# Patient Record
Sex: FEMALE | Race: WHITE | NOT HISPANIC OR LATINO | Employment: OTHER | URBAN - METROPOLITAN AREA
[De-identification: names, ages, dates, MRNs, and addresses within clinical notes are randomized per-mention and may not be internally consistent; named-entity substitution may affect disease eponyms.]

---

## 2017-04-17 ENCOUNTER — HOSPITAL ENCOUNTER (OUTPATIENT)
Dept: LAB | Facility: MEDICAL CENTER | Age: 67
End: 2017-04-17
Attending: INTERNAL MEDICINE
Payer: MEDICARE

## 2017-04-17 LAB
GFR SERPL CREATININE-BSD FRML MDRD: >60 ML/MIN/1.73 M 2
THYROPEROXIDASE AB SERPL-ACNC: <0.2 IU/ML (ref 0–9)
TRANSFERRIN SERPL-MCNC: 260 MG/DL (ref 200–370)

## 2017-04-17 PROCEDURE — 36415 COLL VENOUS BLD VENIPUNCTURE: CPT

## 2017-04-17 PROCEDURE — 83550 IRON BINDING TEST: CPT | Mod: GA

## 2017-04-17 PROCEDURE — 83704 LIPOPROTEIN BLD QUAN PART: CPT

## 2017-04-17 PROCEDURE — 80053 COMPREHEN METABOLIC PANEL: CPT

## 2017-04-17 PROCEDURE — 85025 COMPLETE CBC W/AUTO DIFF WBC: CPT

## 2017-04-17 PROCEDURE — 83540 ASSAY OF IRON: CPT | Mod: GA

## 2017-04-17 PROCEDURE — 80061 LIPID PANEL: CPT | Mod: 59

## 2017-04-17 PROCEDURE — 82248 BILIRUBIN DIRECT: CPT

## 2017-04-17 PROCEDURE — 86376 MICROSOMAL ANTIBODY EACH: CPT

## 2017-04-17 PROCEDURE — 84466 ASSAY OF TRANSFERRIN: CPT | Mod: GA

## 2017-04-17 PROCEDURE — 82728 ASSAY OF FERRITIN: CPT | Mod: GA

## 2017-04-17 PROCEDURE — 84443 ASSAY THYROID STIM HORMONE: CPT

## 2017-04-17 PROCEDURE — 84436 ASSAY OF TOTAL THYROXINE: CPT

## 2017-04-18 LAB
ALBUMIN SERPL BCP-MCNC: 4.2 G/DL (ref 3.2–4.9)
ALBUMIN/GLOB SERPL: 1.6 G/DL
ALP SERPL-CCNC: 54 U/L (ref 30–99)
ALT SERPL-CCNC: 17 U/L (ref 2–50)
ANION GAP SERPL CALC-SCNC: 6 MMOL/L (ref 0–11.9)
AST SERPL-CCNC: 23 U/L (ref 12–45)
BASOPHILS # BLD AUTO: 0.5 % (ref 0–1.8)
BASOPHILS # BLD: 0.02 K/UL (ref 0–0.12)
BILIRUB CONJ SERPL-MCNC: <0.1 MG/DL (ref 0.1–0.5)
BILIRUB INDIRECT SERPL-MCNC: NORMAL MG/DL (ref 0–1)
BILIRUB SERPL-MCNC: 0.3 MG/DL (ref 0.1–1.5)
BUN SERPL-MCNC: 15 MG/DL (ref 8–22)
CALCIUM SERPL-MCNC: 9.4 MG/DL (ref 8.5–10.5)
CHLORIDE SERPL-SCNC: 106 MMOL/L (ref 96–112)
CO2 SERPL-SCNC: 29 MMOL/L (ref 20–33)
CREAT SERPL-MCNC: 0.76 MG/DL (ref 0.5–1.4)
EOSINOPHIL # BLD AUTO: 0.02 K/UL (ref 0–0.51)
EOSINOPHIL NFR BLD: 0.5 % (ref 0–6.9)
ERYTHROCYTE [DISTWIDTH] IN BLOOD BY AUTOMATED COUNT: 45.9 FL (ref 35.9–50)
FERRITIN SERPL-MCNC: 23.7 NG/ML (ref 10–291)
GLOBULIN SER CALC-MCNC: 2.7 G/DL (ref 1.9–3.5)
GLUCOSE SERPL-MCNC: 94 MG/DL (ref 65–99)
HCT VFR BLD AUTO: 40.4 % (ref 37–47)
HGB BLD-MCNC: 13 G/DL (ref 12–16)
IMM GRANULOCYTES # BLD AUTO: 0 K/UL (ref 0–0.11)
IMM GRANULOCYTES NFR BLD AUTO: 0 % (ref 0–0.9)
IRON SATN MFR SERPL: 19 % (ref 15–55)
IRON SERPL-MCNC: 68 UG/DL (ref 40–170)
LYMPHOCYTES # BLD AUTO: 1.79 K/UL (ref 1–4.8)
LYMPHOCYTES NFR BLD: 48.1 % (ref 22–41)
MCH RBC QN AUTO: 29.4 PG (ref 27–33)
MCHC RBC AUTO-ENTMCNC: 32.2 G/DL (ref 33.6–35)
MCV RBC AUTO: 91.4 FL (ref 81.4–97.8)
MONOCYTES # BLD AUTO: 0.29 K/UL (ref 0–0.85)
MONOCYTES NFR BLD AUTO: 7.8 % (ref 0–13.4)
NEUTROPHILS # BLD AUTO: 1.6 K/UL (ref 2–7.15)
NEUTROPHILS NFR BLD: 43.1 % (ref 44–72)
NRBC # BLD AUTO: 0 K/UL
NRBC BLD AUTO-RTO: 0 /100 WBC
PLATELET # BLD AUTO: 247 K/UL (ref 164–446)
PMV BLD AUTO: 10.6 FL (ref 9–12.9)
POTASSIUM SERPL-SCNC: 4.6 MMOL/L (ref 3.6–5.5)
PROT SERPL-MCNC: 6.9 G/DL (ref 6–8.2)
RBC # BLD AUTO: 4.42 M/UL (ref 4.2–5.4)
SODIUM SERPL-SCNC: 141 MMOL/L (ref 135–145)
T4 SERPL-MCNC: 11.6 UG/DL (ref 4–12)
TIBC SERPL-MCNC: 365 UG/DL (ref 250–450)
TSH SERPL DL<=0.005 MIU/L-ACNC: 0.65 UIU/ML (ref 0.3–3.7)
WBC # BLD AUTO: 3.7 K/UL (ref 4.8–10.8)

## 2017-04-20 LAB
CHOLEST SERPL-MCNC: 208 MG/DL (ref 100–199)
HDL PARTICAL NO Q4363: 45.3 UMOL/L
HDL SERPL QN: 10.1 NM
HDLC SERPL-MCNC: 103 MG/DL
HLD.LARGE SERPL-SCNC: 15 UMOL/L
LDL MED SERPL QN: 21.2 NM
LDL SERPL QN: 21.2 NM
LDL SERPL-SCNC: 975 NMOL/L
LDL SMALL SERPL-SCNC: <90 NMOL/L
LDL SMALL SERPL-SCNC: <90 NMOL/L
LDLC SERPL CALC-MCNC: 95 MG/DL (ref 0–99)
LP IR SCORE Q4364: <25
TRIGL SERPL-MCNC: 51 MG/DL (ref 0–149)
VLDL LARGE SERPL-SCNC: 0.8 NMOL/L
VLDL SERPL QN: 45.1 NM

## 2017-07-09 ENCOUNTER — HOSPITAL ENCOUNTER (EMERGENCY)
Facility: MEDICAL CENTER | Age: 67
End: 2017-07-09
Attending: EMERGENCY MEDICINE
Payer: MEDICARE

## 2017-07-09 VITALS
HEART RATE: 89 BPM | OXYGEN SATURATION: 98 % | TEMPERATURE: 98.5 F | DIASTOLIC BLOOD PRESSURE: 90 MMHG | BODY MASS INDEX: 19.84 KG/M2 | HEIGHT: 66 IN | WEIGHT: 123.46 LBS | RESPIRATION RATE: 18 BRPM | SYSTOLIC BLOOD PRESSURE: 138 MMHG

## 2017-07-09 DIAGNOSIS — S39.012A LUMBAR STRAIN, INITIAL ENCOUNTER: ICD-10-CM

## 2017-07-09 PROCEDURE — 700102 HCHG RX REV CODE 250 W/ 637 OVERRIDE(OP): Performed by: EMERGENCY MEDICINE

## 2017-07-09 PROCEDURE — A9270 NON-COVERED ITEM OR SERVICE: HCPCS | Performed by: EMERGENCY MEDICINE

## 2017-07-09 PROCEDURE — 99283 EMERGENCY DEPT VISIT LOW MDM: CPT

## 2017-07-09 RX ORDER — OXYCODONE HYDROCHLORIDE AND ACETAMINOPHEN 5; 325 MG/1; MG/1
1 TABLET ORAL ONCE
Status: COMPLETED | OUTPATIENT
Start: 2017-07-09 | End: 2017-07-09

## 2017-07-09 RX ORDER — IBUPROFEN 600 MG/1
600 TABLET ORAL ONCE
Status: COMPLETED | OUTPATIENT
Start: 2017-07-09 | End: 2017-07-09

## 2017-07-09 RX ORDER — OXYCODONE HYDROCHLORIDE AND ACETAMINOPHEN 5; 325 MG/1; MG/1
0.5 TABLET ORAL EVERY 4 HOURS PRN
Qty: 5 TAB | Refills: 0 | Status: ON HOLD | OUTPATIENT
Start: 2017-07-09 | End: 2018-06-14

## 2017-07-09 RX ADMIN — IBUPROFEN 600 MG: 600 TABLET, FILM COATED ORAL at 15:40

## 2017-07-09 RX ADMIN — OXYCODONE HYDROCHLORIDE AND ACETAMINOPHEN 1 TABLET: 5; 325 TABLET ORAL at 15:40

## 2017-07-09 ASSESSMENT — PAIN SCALES - GENERAL: PAINLEVEL_OUTOF10: 8

## 2017-07-09 NOTE — ED PROVIDER NOTES
"ED Provider Note    CHIEF COMPLAINT  Chief Complaint   Patient presents with   • Back Pain       HPI  Miranda Rose is a 67 y.o. female who presents to the emergency department with low back discomfort. The patient states she is playing golf yesterday when she made a swing and then started having right low back discomfort. Subsequently the pain started to increase and today she states the pain is almost unbearable. She states the pain is localized to the right low back region. She does not have any radicular symptoms. She denies paresthesias in functional loss of her lower extremities. She does not have any difficulty with urination including dysuria nor hematuria.    REVIEW OF SYSTEMS  No recent fevers    PHYSICAL EXAM  VITAL SIGNS: /102 mmHg  Pulse 81  Temp(Src) 36.7 °C (98.1 °F)  Resp 20  Ht 1.676 m (5' 5.98\")  Wt 56 kg (123 lb 7.3 oz)  BMI 19.94 kg/m2  SpO2 98%  In general the patient does not appear toxic  Cervical, thoracic, lumbar spine does not have any midline tenderness nor step-offs  The patient does have reproducible pain in the paraspinal muscles of the lumbar spine on the right.  Skin no erythema nor induration  Neurologic examination motors 5 out of 5 and symmetric throughout her lower extremities and sensation is intact      COURSE & MEDICAL DECISION MAKING  Pertinent Labs & Imaging studies reviewed. (See chart for details)  This a 67-year-old female who presents with low back discomfort. Suspect this is from a lumbar strain. She does not have any urinary symptoms and she does have reproducible discomfort consistent with a muscular etiology. The patient will therefore be discharged on Motrin and Percocet for acute pain control. She will return for increased pain, weakness to her lower extremities, or difficulty initiating her stream of urine.     FINAL IMPRESSION  1. Acute lumbar strain     Disposition  The patient will be discharged in stable condition      Electronically signed " by: Carl Chiang, 7/9/2017 3:32 PM

## 2017-07-09 NOTE — DISCHARGE INSTRUCTIONS
Lumbosacral Strain  Lumbosacral strain is a strain of any of the parts that make up your lumbosacral vertebrae. Your lumbosacral vertebrae are the bones that make up the lower third of your backbone. Your lumbosacral vertebrae are held together by muscles and tough, fibrous tissue (ligaments).   CAUSES   A sudden blow to your back can cause lumbosacral strain. Also, anything that causes an excessive stretch of the muscles in the low back can cause this strain. This is typically seen when people exert themselves strenuously, fall, lift heavy objects, bend, or crouch repeatedly.  RISK FACTORS  · Physically demanding work.  · Participation in pushing or pulling sports or sports that require a sudden twist of the back (tennis, golf, baseball).  · Weight lifting.  · Excessive lower back curvature.  · Forward-tilted pelvis.  · Weak back or abdominal muscles or both.  · Tight hamstrings.  SIGNS AND SYMPTOMS   Lumbosacral strain may cause pain in the area of your injury or pain that moves (radiates) down your leg.   DIAGNOSIS  Your health care provider can often diagnose lumbosacral strain through a physical exam. In some cases, you may need tests such as X-ray exams.   TREATMENT   Treatment for your lower back injury depends on many factors that your clinician will have to evaluate. However, most treatment will include the use of anti-inflammatory medicines.  HOME CARE INSTRUCTIONS   · Avoid hard physical activities (tennis, racquetball, waterskiing) if you are not in proper physical condition for it. This may aggravate or create problems.  · If you have a back problem, avoid sports requiring sudden body movements. Swimming and walking are generally safer activities.  · Maintain good posture.  · Maintain a healthy weight.  · For acute conditions, you may put ice on the injured area.  ¨ Put ice in a plastic bag.  ¨ Place a towel between your skin and the bag.  ¨ Leave the ice on for 20 minutes, 2-3 times a day.  · When the  low back starts healing, stretching and strengthening exercises may be recommended.  SEEK MEDICAL CARE IF:  · Your back pain is getting worse.  · You experience severe back pain not relieved with medicines.  SEEK IMMEDIATE MEDICAL CARE IF:   · You have numbness, tingling, weakness, or problems with the use of your arms or legs.  · There is a change in bowel or bladder control.  · You have increasing pain in any area of the body, including your belly (abdomen).  · You notice shortness of breath, dizziness, or feel faint.  · You feel sick to your stomach (nauseous), are throwing up (vomiting), or become sweaty.  · You notice discoloration of your toes or legs, or your feet get very cold.  MAKE SURE YOU:   · Understand these instructions.  · Will watch your condition.  · Will get help right away if you are not doing well or get worse.     This information is not intended to replace advice given to you by your health care provider. Make sure you discuss any questions you have with your health care provider.     Document Released: 09/27/2006 Document Revised: 01/08/2016 Document Reviewed: 08/06/2014  [employer] Interactive Patient Education ©2016 [employer]

## 2017-07-09 NOTE — ED AVS SNAPSHOT
SendTask Access Code: Activation code not generated  Current SendTask Status: Active    [employer]  A secure, online tool to manage your health information     [employer]’s SendTask® is a secure, online tool that connects you to your personalized health information from the privacy of your home -- day or night - making it very easy for you to manage your healthcare. Once the activation process is completed, you can even access your medical information using the SendTask graciela, which is available for free in the Apple Graciela store or Google Play store.     SendTask provides the following levels of access (as shown below):   My Chart Features   [facility] Primary Care Doctor [facility]  Specialists [facility]  Urgent  Care Non-[facility]  Primary Care  Doctor   Email your healthcare team securely and privately 24/7 X X X X   Manage appointments: schedule your next appointment; view details of past/upcoming appointments X      Request prescription refills. X      View recent personal medical records, including lab and immunizations X X X X   View health record, including health history, allergies, medications X X X X   Read reports about your outpatient visits, procedures, consult and ER notes X X X X   See your discharge summary, which is a recap of your hospital and/or ER visit that includes your diagnosis, lab results, and care plan. X X       How to register for SendTask:  1. Go to  [email or web address].  2. Click on the Sign Up Now box, which takes you to the New Member Sign Up page. You will need to provide the following information:  a. Enter your SendTask Access Code exactly as it appears at the top of this page. (You will not need to use this code after you’ve completed the sign-up process. If you do not sign up before the expiration date, you must request a new code.)   b. Enter your date of birth.   c. Enter your home email address.   d. Click Submit, and follow the next screen’s instructions.  3. Create a SendTask ID. This will  be your [employer] login ID and cannot be changed, so think of one that is secure and easy to remember.  4. Create a [employer] password. You can change your password at any time.  5. Enter your Password Reset Question and Answer. This can be used at a later time if you forget your password.   6. Enter your e-mail address. This allows you to receive e-mail notifications when new information is available in [employer].  7. Click Sign Up. You can now view your health information.    For assistance activating your [employer] account, call [phone]

## 2017-07-09 NOTE — ED AVS SNAPSHOT
Home Care Instructions                                                                                                                Miranda Rose   MRN: [id number]    Department:  [facility] [place], Emergency Dept   Date of Visit:  7/9/2017            [facility], Emergency Dept    [address]    [place] [address]    Phone:  [phone]      You were seen by     Carl Chiang M.D.      Your Diagnosis Was     Lumbar strain, initial encounter     S39.012A       These are the medications you received during your hospitalization from 07/09/2017 1326 to 07/09/2017 1543     Date/Time Order Dose Route Action    07/09/2017 1540 oxycodone-acetaminophen (PERCOCET) 5-325 MG per tablet 1 Tab 1 Tab Oral Given    07/09/2017 1540 ibuprofen (MOTRIN) tablet 600 mg 600 mg Oral Given      Follow-up Information     1. Follow up with [facility], Emergency Dept.    Specialty:  Emergency Medicine    Why:  If symptoms worsen    Contact information    [id number] [id number] [phone]      Medication Information     Review all of your home medications and newly ordered medications with your primary doctor and/or pharmacist as soon as possible. Follow medication instructions as directed by your doctor and/or pharmacist.     Please keep your complete medication list with you and share with your physician. Update the information when medications are discontinued, doses are changed, or new medications (including over-the-counter products) are added; and carry medication information at all times in the event of emergency situations.               Medication List      START taking these medications        Instructions    Morning Afternoon Evening Bedtime    oxycodone-acetaminophen 5-325 MG Tabs   Last time this was given:  1 Tab on 7/9/2017  3:40 PM   Commonly known as:  PERCOCET        Take 0.5 Tabs by mouth every four hours as  needed.   Dose:  0.5 Tab                          ASK your doctor about these medications        Instructions    Morning Afternoon Evening Bedtime    atorvastatin 20 MG Tabs   Commonly known as:  LIPITOR        Take 20 mg by mouth every evening.   Dose:  20 mg                        doxepin 10 MG Caps   Commonly known as:  SINEQUAN        Take 15 mg by mouth every evening.   Dose:  15 mg                        levothyroxine 100 MCG Tabs   Commonly known as:  SYNTHROID        Take 88 mcg by mouth every day.   Dose:  88 mcg                        liothyronine 5 MCG Tabs   Commonly known as:  CYTOMEL        Take 5 mcg by mouth 2 Times a Day.   Dose:  5 mcg                        multivitamin Tabs        Take 1 Tab by mouth every day.   Dose:  1 Tab                        simvastatin 10 MG Tabs   Commonly known as:  ZOCOR        Take 10 mg by mouth every evening.   Dose:  10 mg                             Where to Get Your Medications      You can get these medications from any pharmacy     Bring a paper prescription for each of these medications    - oxycodone-acetaminophen 5-325 MG Tabs              Discharge Instructions       Lumbosacral Strain  Lumbosacral strain is a strain of any of the parts that make up your lumbosacral vertebrae. Your lumbosacral vertebrae are the bones that make up the lower third of your backbone. Your lumbosacral vertebrae are held together by muscles and tough, fibrous tissue (ligaments).   CAUSES   A sudden blow to your back can cause lumbosacral strain. Also, anything that causes an excessive stretch of the muscles in the low back can cause this strain. This is typically seen when people exert themselves strenuously, fall, lift heavy objects, bend, or crouch repeatedly.  RISK FACTORS  · Physically demanding work.  · Participation in pushing or pulling sports or sports that require a sudden twist of the back (tennis, golf, baseball).  · Weight lifting.  · Excessive lower back  curvature.  · Forward-tilted pelvis.  · Weak back or abdominal muscles or both.  · Tight hamstrings.  SIGNS AND SYMPTOMS   Lumbosacral strain may cause pain in the area of your injury or pain that moves (radiates) down your leg.   DIAGNOSIS  Your health care provider can often diagnose lumbosacral strain through a physical exam. In some cases, you may need tests such as X-ray exams.   TREATMENT   Treatment for your lower back injury depends on many factors that your clinician will have to evaluate. However, most treatment will include the use of anti-inflammatory medicines.  HOME CARE INSTRUCTIONS   · Avoid hard physical activities (tennis, racquetball, waterskiing) if you are not in proper physical condition for it. This may aggravate or create problems.  · If you have a back problem, avoid sports requiring sudden body movements. Swimming and walking are generally safer activities.  · Maintain good posture.  · Maintain a healthy weight.  · For acute conditions, you may put ice on the injured area.  ¨ Put ice in a plastic bag.  ¨ Place a towel between your skin and the bag.  ¨ Leave the ice on for 20 minutes, 2-3 times a day.  · When the low back starts healing, stretching and strengthening exercises may be recommended.  SEEK MEDICAL CARE IF:  · Your back pain is getting worse.  · You experience severe back pain not relieved with medicines.  SEEK IMMEDIATE MEDICAL CARE IF:   · You have numbness, tingling, weakness, or problems with the use of your arms or legs.  · There is a change in bowel or bladder control.  · You have increasing pain in any area of the body, including your belly (abdomen).  · You notice shortness of breath, dizziness, or feel faint.  · You feel sick to your stomach (nauseous), are throwing up (vomiting), or become sweaty.  · You notice discoloration of your toes or legs, or your feet get very cold.  MAKE SURE YOU:   · Understand these instructions.  · Will watch your condition.  · Will get help  right away if you are not doing well or get worse.     This information is not intended to replace advice given to you by your health care provider. Make sure you discuss any questions you have with your health care provider.     Document Released: 09/27/2006 Document Revised: 01/08/2016 Document Reviewed: 08/06/2014  Elsevier Interactive Patient Education ©2016 [employer]            Patient Information     Patient Information    Following emergency treatment: all patient requiring follow-up care must return either to a private physician or a clinic if your condition worsens before you are able to obtain further medical attention, please return to the emergency room.     Billing Information    [facility] we work to make the billing process streamlined for our patients.  Our Representatives are here to answer any questions you may have regarding your hospital bill.  If you have insurance coverage and have supplied your insurance information to us, we will submit a claim to your insurer on your behalf.  Should you have any questions regarding your bill, we can be reached online or by phone as follows:  Online: You are able pay your bills online or live chat with our representatives about any billing questions you may have. We are here to help Monday - Friday from 8:00am to 7:30pm and 9:00am - 12:00pm on Saturdays.  Please visit [email or web address]  for more information.   Phone:  [phone] or [phone]    Please note that your emergency physician, surgeon, pathologist, radiologist, anesthesiologist, and other specialists are not employed by [facility] and will therefore bill separately for their services.  Please contact them directly for any questions concerning their bills at the numbers below:     Emergency Physician Services:  [phone]  [facility]:  [phone]  [facility]:  [phone]  [facility]:   [phone]    1. Your final bill may vary from the amount quoted upon discharge if all procedures are not complete at that time, or if your doctor has additional procedures of which we are not aware. You will receive an additional bill if you return to the Emergency Department at [facility] for suture removal regardless of the facility of which the sutures were placed.     2. Please arrange for settlement of this account at the emergency registration.    3. All self-pay accounts are due in full at the time of treatment.  If you are unable to meet this obligation then payment is expected within 4-5 days.     4. If you have had radiology studies (CT, X-ray, Ultrasound, MRI), you have received a preliminary result during your emergency department visit. Please contact the radiology department [phone] to receive a copy of your final result. Please discuss the Final result with your primary physician or with the follow up physician provided.     Crisis Hotline:  [place] Crisis Hotline:  5-750-EELEBUQ or 1-608.534.2350  [place] Crisis Hotline:    1-225.598.3125 or 301-650-3662         ED Discharge Follow Up Questions    1. In order to provide you with very good care, we would like to follow up with a phone call in the next few days.  May we have your permission to contact you?     YES /  NO    2. What is the best phone number to call you? (       )_____-__________    3. What is the best time to call you?      Morning  /  Afternoon  /  Evening                   Patient Signature:  ____________________________________________________________    Date:  ____________________________________________________________

## 2017-07-09 NOTE — ED AVS SNAPSHOT
7/9/2017    Miranda Rose  [address] [place] [address]    Dear Miranda:    [facility] wants to ensure your discharge home is safe and you or your loved ones have had all of your questions answered regarding your care after you leave the hospital.    Below is a list of resources and contact information should you have any questions regarding your hospital stay, follow-up instructions, or active medical symptoms.    Questions or Concerns Regarding… Contact   Medical Questions Related to Your Discharge  (7 days a week, 8am-5pm) Contact a Nurse Care Coordinator   [phone]   Medical Questions Not Related to Your Discharge  (24 hours a day / 7 days a week)  Contact the Nurse Health Line   [phone]    Medications or Discharge Instructions Refer to your discharge packet   or contact your [facility] Primary Care Provider   [phone]   Follow-up Appointment(s) Schedule your appointment via NICO   or contact Scheduling [phone]   Billing Review your statement via NICO  or contact Billing [phone]   Medical Records Review your records via NICO   or contact Medical Records [phone]     You may receive a telephone call within two days of discharge. This call is to make certain you understand your discharge instructions and have the opportunity to have any questions answered. You can also easily access your medical information, test results and upcoming appointments via the NICO free online health management tool. You can learn more and sign up at [employer]/NICO. For assistance setting up your NICO account, please call [phone].    Once again, we want to ensure your discharge home is safe and that you have a clear understanding of any next steps in your care. If you have any questions or concerns, please do not hesitate to contact us, we are here for you. Thank you for choosing [facility] for your healthcare needs.    Sincerely,    Your [facility] Healthcare Team

## 2017-09-13 ENCOUNTER — HOSPITAL ENCOUNTER (OUTPATIENT)
Dept: RADIOLOGY | Facility: MEDICAL CENTER | Age: 67
End: 2017-09-13
Attending: NURSE PRACTITIONER
Payer: MEDICARE

## 2017-09-13 DIAGNOSIS — J32.9 UNSPECIFIED SINUSITIS (CHRONIC): ICD-10-CM

## 2017-09-13 PROCEDURE — 70486 CT MAXILLOFACIAL W/O DYE: CPT

## 2018-03-06 ENCOUNTER — HOSPITAL ENCOUNTER (OUTPATIENT)
Dept: HOSPITAL 8 - CFH | Age: 68
Discharge: HOME | End: 2018-03-06
Attending: NURSE PRACTITIONER
Payer: MEDICARE

## 2018-03-06 DIAGNOSIS — J32.9: ICD-10-CM

## 2018-03-06 DIAGNOSIS — R00.2: ICD-10-CM

## 2018-03-06 DIAGNOSIS — R53.83: ICD-10-CM

## 2018-03-06 DIAGNOSIS — R19.7: ICD-10-CM

## 2018-03-06 DIAGNOSIS — E78.2: ICD-10-CM

## 2018-03-06 DIAGNOSIS — E03.9: ICD-10-CM

## 2018-03-06 DIAGNOSIS — Z78.9: ICD-10-CM

## 2018-03-06 DIAGNOSIS — Z86.19: ICD-10-CM

## 2018-03-06 DIAGNOSIS — M85.80: ICD-10-CM

## 2018-03-06 DIAGNOSIS — R05: Primary | ICD-10-CM

## 2018-03-06 DIAGNOSIS — Z85.3: ICD-10-CM

## 2018-03-06 DIAGNOSIS — J18.9: ICD-10-CM

## 2018-03-06 DIAGNOSIS — D72.820: ICD-10-CM

## 2018-03-06 DIAGNOSIS — G47.00: ICD-10-CM

## 2018-03-06 PROCEDURE — 71046 X-RAY EXAM CHEST 2 VIEWS: CPT

## 2018-06-12 ENCOUNTER — HOSPITAL ENCOUNTER (OUTPATIENT)
Dept: RADIOLOGY | Facility: MEDICAL CENTER | Age: 68
End: 2018-06-12
Attending: EMERGENCY MEDICINE
Payer: MEDICARE

## 2018-06-12 DIAGNOSIS — Q27.9 CONGENITAL VASCULAR DISEASE: ICD-10-CM

## 2018-06-12 DIAGNOSIS — I25.10 ATHEROSCLEROSIS OF NATIVE CORONARY ARTERY, ANGINA PRESENCE UNSPECIFIED, UNSPECIFIED WHETHER NATIVE OR TRANSPLANTED HEART: ICD-10-CM

## 2018-06-12 DIAGNOSIS — H93.A9 PULSATILE TINNITUS: ICD-10-CM

## 2018-06-12 PROCEDURE — 93880 EXTRACRANIAL BILAT STUDY: CPT

## 2018-06-13 ENCOUNTER — APPOINTMENT (OUTPATIENT)
Dept: RADIOLOGY | Facility: MEDICAL CENTER | Age: 68
End: 2018-06-13
Attending: EMERGENCY MEDICINE
Payer: MEDICARE

## 2018-06-13 ENCOUNTER — HOSPITAL ENCOUNTER (OUTPATIENT)
Facility: MEDICAL CENTER | Age: 68
End: 2018-06-14
Attending: EMERGENCY MEDICINE | Admitting: INTERNAL MEDICINE
Payer: MEDICARE

## 2018-06-13 DIAGNOSIS — R07.89 ATYPICAL CHEST PAIN: ICD-10-CM

## 2018-06-13 PROBLEM — I10 HIGH BLOOD PRESSURE: Status: ACTIVE | Noted: 2018-06-13

## 2018-06-13 PROBLEM — N17.9 AKI (ACUTE KIDNEY INJURY) (HCC): Status: ACTIVE | Noted: 2018-06-13

## 2018-06-13 PROBLEM — R73.9 HYPERGLYCEMIA: Status: ACTIVE | Noted: 2018-06-13

## 2018-06-13 LAB
ALBUMIN SERPL BCP-MCNC: 4.7 G/DL (ref 3.2–4.9)
ALBUMIN/GLOB SERPL: 2 G/DL
ALP SERPL-CCNC: 65 U/L (ref 30–99)
ALT SERPL-CCNC: 22 U/L (ref 2–50)
ANION GAP SERPL CALC-SCNC: 8 MMOL/L (ref 0–11.9)
APTT PPP: 32.6 SEC (ref 24.7–36)
AST SERPL-CCNC: 25 U/L (ref 12–45)
BASOPHILS # BLD AUTO: 0.2 % (ref 0–1.8)
BASOPHILS # BLD: 0.01 K/UL (ref 0–0.12)
BILIRUB SERPL-MCNC: 0.5 MG/DL (ref 0.1–1.5)
BNP SERPL-MCNC: 15 PG/ML (ref 0–100)
BUN SERPL-MCNC: 18 MG/DL (ref 8–22)
CALCIUM SERPL-MCNC: 9.7 MG/DL (ref 8.4–10.2)
CHLORIDE SERPL-SCNC: 104 MMOL/L (ref 96–112)
CO2 SERPL-SCNC: 26 MMOL/L (ref 20–33)
CREAT SERPL-MCNC: 1.11 MG/DL (ref 0.5–1.4)
EKG IMPRESSION: NORMAL
EOSINOPHIL # BLD AUTO: 0.02 K/UL (ref 0–0.51)
EOSINOPHIL NFR BLD: 0.3 % (ref 0–6.9)
ERYTHROCYTE [DISTWIDTH] IN BLOOD BY AUTOMATED COUNT: 43.8 FL (ref 35.9–50)
EST. AVERAGE GLUCOSE BLD GHB EST-MCNC: 114 MG/DL
GLOBULIN SER CALC-MCNC: 2.4 G/DL (ref 1.9–3.5)
GLUCOSE SERPL-MCNC: 166 MG/DL (ref 65–99)
HBA1C MFR BLD: 5.6 % (ref 0–5.6)
HCT VFR BLD AUTO: 40.8 % (ref 37–47)
HGB BLD-MCNC: 13.4 G/DL (ref 12–16)
IMM GRANULOCYTES # BLD AUTO: 0.01 K/UL (ref 0–0.11)
IMM GRANULOCYTES NFR BLD AUTO: 0.2 % (ref 0–0.9)
INR PPP: 0.93 (ref 0.87–1.13)
LIPASE SERPL-CCNC: 33 U/L (ref 7–58)
LYMPHOCYTES # BLD AUTO: 2.07 K/UL (ref 1–4.8)
LYMPHOCYTES NFR BLD: 31.9 % (ref 22–41)
MCH RBC QN AUTO: 29.8 PG (ref 27–33)
MCHC RBC AUTO-ENTMCNC: 32.8 G/DL (ref 33.6–35)
MCV RBC AUTO: 90.7 FL (ref 81.4–97.8)
MONOCYTES # BLD AUTO: 0.42 K/UL (ref 0–0.85)
MONOCYTES NFR BLD AUTO: 6.5 % (ref 0–13.4)
NEUTROPHILS # BLD AUTO: 3.96 K/UL (ref 2–7.15)
NEUTROPHILS NFR BLD: 60.9 % (ref 44–72)
NRBC # BLD AUTO: 0 K/UL
NRBC BLD-RTO: 0 /100 WBC
PLATELET # BLD AUTO: 264 K/UL (ref 164–446)
PMV BLD AUTO: 10.1 FL (ref 9–12.9)
POTASSIUM SERPL-SCNC: 3.7 MMOL/L (ref 3.6–5.5)
PROT SERPL-MCNC: 7.1 G/DL (ref 6–8.2)
PROTHROMBIN TIME: 12.4 SEC (ref 12–14.6)
RBC # BLD AUTO: 4.5 M/UL (ref 4.2–5.4)
SODIUM SERPL-SCNC: 138 MMOL/L (ref 135–145)
T3FREE SERPL-MCNC: 3.33 PG/ML (ref 2.4–4.2)
T4 FREE SERPL-MCNC: 1.15 NG/DL (ref 0.58–1.64)
TROPONIN I SERPL-MCNC: <0.02 NG/ML (ref 0–0.04)
TROPONIN I SERPL-MCNC: <0.02 NG/ML (ref 0–0.04)
TSH SERPL DL<=0.005 MIU/L-ACNC: 1.46 UIU/ML (ref 0.38–5.33)
WBC # BLD AUTO: 6.5 K/UL (ref 4.8–10.8)

## 2018-06-13 PROCEDURE — 700102 HCHG RX REV CODE 250 W/ 637 OVERRIDE(OP): Performed by: EMERGENCY MEDICINE

## 2018-06-13 PROCEDURE — 700102 HCHG RX REV CODE 250 W/ 637 OVERRIDE(OP): Performed by: INTERNAL MEDICINE

## 2018-06-13 PROCEDURE — 36415 COLL VENOUS BLD VENIPUNCTURE: CPT

## 2018-06-13 PROCEDURE — G0378 HOSPITAL OBSERVATION PER HR: HCPCS

## 2018-06-13 PROCEDURE — 71045 X-RAY EXAM CHEST 1 VIEW: CPT

## 2018-06-13 PROCEDURE — 85730 THROMBOPLASTIN TIME PARTIAL: CPT

## 2018-06-13 PROCEDURE — A9270 NON-COVERED ITEM OR SERVICE: HCPCS | Performed by: EMERGENCY MEDICINE

## 2018-06-13 PROCEDURE — 84439 ASSAY OF FREE THYROXINE: CPT

## 2018-06-13 PROCEDURE — 99220 PR INITIAL OBSERVATION CARE,LEVL III: CPT | Performed by: INTERNAL MEDICINE

## 2018-06-13 PROCEDURE — 84443 ASSAY THYROID STIM HORMONE: CPT

## 2018-06-13 PROCEDURE — 80053 COMPREHEN METABOLIC PANEL: CPT

## 2018-06-13 PROCEDURE — 83880 ASSAY OF NATRIURETIC PEPTIDE: CPT

## 2018-06-13 PROCEDURE — 84481 FREE ASSAY (FT-3): CPT

## 2018-06-13 PROCEDURE — 93005 ELECTROCARDIOGRAM TRACING: CPT | Performed by: EMERGENCY MEDICINE

## 2018-06-13 PROCEDURE — 85025 COMPLETE CBC W/AUTO DIFF WBC: CPT

## 2018-06-13 PROCEDURE — 84484 ASSAY OF TROPONIN QUANT: CPT | Mod: 91

## 2018-06-13 PROCEDURE — 83036 HEMOGLOBIN GLYCOSYLATED A1C: CPT

## 2018-06-13 PROCEDURE — 83690 ASSAY OF LIPASE: CPT

## 2018-06-13 PROCEDURE — A9270 NON-COVERED ITEM OR SERVICE: HCPCS | Performed by: INTERNAL MEDICINE

## 2018-06-13 PROCEDURE — 700105 HCHG RX REV CODE 258: Performed by: INTERNAL MEDICINE

## 2018-06-13 PROCEDURE — 94760 N-INVAS EAR/PLS OXIMETRY 1: CPT

## 2018-06-13 PROCEDURE — 93005 ELECTROCARDIOGRAM TRACING: CPT

## 2018-06-13 PROCEDURE — 99285 EMERGENCY DEPT VISIT HI MDM: CPT

## 2018-06-13 PROCEDURE — 85610 PROTHROMBIN TIME: CPT

## 2018-06-13 RX ORDER — SODIUM CHLORIDE 9 MG/ML
INJECTION, SOLUTION INTRAVENOUS CONTINUOUS
Status: DISCONTINUED | OUTPATIENT
Start: 2018-06-13 | End: 2018-06-14 | Stop reason: HOSPADM

## 2018-06-13 RX ORDER — POLYETHYLENE GLYCOL 3350 17 G/17G
1 POWDER, FOR SOLUTION ORAL
Status: DISCONTINUED | OUTPATIENT
Start: 2018-06-13 | End: 2018-06-14 | Stop reason: HOSPADM

## 2018-06-13 RX ORDER — ASPIRIN 600 MG/1
300 SUPPOSITORY RECTAL DAILY
Status: DISCONTINUED | OUTPATIENT
Start: 2018-06-14 | End: 2018-06-14 | Stop reason: HOSPADM

## 2018-06-13 RX ORDER — AMOXICILLIN 250 MG
2 CAPSULE ORAL 2 TIMES DAILY
Status: DISCONTINUED | OUTPATIENT
Start: 2018-06-13 | End: 2018-06-14 | Stop reason: HOSPADM

## 2018-06-13 RX ORDER — AMLODIPINE BESYLATE 5 MG/1
5 TABLET ORAL
Status: DISCONTINUED | OUTPATIENT
Start: 2018-06-14 | End: 2018-06-14 | Stop reason: HOSPADM

## 2018-06-13 RX ORDER — BISACODYL 10 MG
10 SUPPOSITORY, RECTAL RECTAL
Status: DISCONTINUED | OUTPATIENT
Start: 2018-06-13 | End: 2018-06-14 | Stop reason: HOSPADM

## 2018-06-13 RX ORDER — LEVOTHYROXINE SODIUM 88 UG/1
88 TABLET ORAL DAILY
Status: DISCONTINUED | OUTPATIENT
Start: 2018-06-14 | End: 2018-06-14

## 2018-06-13 RX ORDER — ASPIRIN 325 MG
325 TABLET ORAL DAILY
Status: DISCONTINUED | OUTPATIENT
Start: 2018-06-14 | End: 2018-06-14 | Stop reason: HOSPADM

## 2018-06-13 RX ORDER — ACETAMINOPHEN 325 MG/1
650 TABLET ORAL EVERY 6 HOURS PRN
Status: DISCONTINUED | OUTPATIENT
Start: 2018-06-13 | End: 2018-06-14 | Stop reason: HOSPADM

## 2018-06-13 RX ORDER — HEPARIN SODIUM 5000 [USP'U]/ML
5000 INJECTION, SOLUTION INTRAVENOUS; SUBCUTANEOUS EVERY 8 HOURS
Status: DISCONTINUED | OUTPATIENT
Start: 2018-06-13 | End: 2018-06-14 | Stop reason: HOSPADM

## 2018-06-13 RX ORDER — ASPIRIN 81 MG/1
324 TABLET, CHEWABLE ORAL DAILY
Status: DISCONTINUED | OUTPATIENT
Start: 2018-06-14 | End: 2018-06-14 | Stop reason: HOSPADM

## 2018-06-13 RX ORDER — ASPIRIN 81 MG/1
324 TABLET, CHEWABLE ORAL ONCE
Status: COMPLETED | OUTPATIENT
Start: 2018-06-13 | End: 2018-06-13

## 2018-06-13 RX ORDER — DOXEPIN HYDROCHLORIDE 10 MG/1
15 CAPSULE ORAL NIGHTLY
Status: DISCONTINUED | OUTPATIENT
Start: 2018-06-13 | End: 2018-06-14 | Stop reason: HOSPADM

## 2018-06-13 RX ORDER — ATORVASTATIN CALCIUM 40 MG/1
20 TABLET, FILM COATED ORAL NIGHTLY
Status: DISCONTINUED | OUTPATIENT
Start: 2018-06-13 | End: 2018-06-14 | Stop reason: HOSPADM

## 2018-06-13 RX ORDER — LIOTHYRONINE SODIUM 5 UG/1
5 TABLET ORAL 2 TIMES DAILY
Status: DISCONTINUED | OUTPATIENT
Start: 2018-06-13 | End: 2018-06-14

## 2018-06-13 RX ORDER — OXYCODONE HYDROCHLORIDE AND ACETAMINOPHEN 5; 325 MG/1; MG/1
0.5 TABLET ORAL EVERY 4 HOURS PRN
Status: DISCONTINUED | OUTPATIENT
Start: 2018-06-13 | End: 2018-06-14 | Stop reason: HOSPADM

## 2018-06-13 RX ADMIN — SODIUM CHLORIDE: 9 INJECTION, SOLUTION INTRAVENOUS at 23:11

## 2018-06-13 RX ADMIN — ASPIRIN 81 MG 324 MG: 81 TABLET ORAL at 23:11

## 2018-06-13 RX ADMIN — DOCUSATE SODIUM AND SENNOSIDES 2 TABLET: 8.6; 5 TABLET, FILM COATED ORAL at 23:12

## 2018-06-13 ASSESSMENT — ENCOUNTER SYMPTOMS
COUGH: 0
CHILLS: 0
DEPRESSION: 0
EYE PAIN: 0
VOMITING: 0
FEVER: 0
DIZZINESS: 0
PALPITATIONS: 0
HEADACHES: 0
SHORTNESS OF BREATH: 0
WEIGHT LOSS: 0
SEIZURES: 0
BLURRED VISION: 0
HEARTBURN: 0
INSOMNIA: 0
ABDOMINAL PAIN: 0
EYE DISCHARGE: 0
BACK PAIN: 0
DIARRHEA: 0
NAUSEA: 0
EYE REDNESS: 0
NECK PAIN: 0
NERVOUS/ANXIOUS: 0
MYALGIAS: 0
SPUTUM PRODUCTION: 0
FOCAL WEAKNESS: 0
STRIDOR: 0
ORTHOPNEA: 0

## 2018-06-13 ASSESSMENT — PAIN SCALES - GENERAL: PAINLEVEL_OUTOF10: 7

## 2018-06-14 ENCOUNTER — APPOINTMENT (OUTPATIENT)
Dept: RADIOLOGY | Facility: MEDICAL CENTER | Age: 68
End: 2018-06-14
Attending: INTERNAL MEDICINE
Payer: MEDICARE

## 2018-06-14 VITALS
TEMPERATURE: 97.5 F | HEIGHT: 65 IN | RESPIRATION RATE: 18 BRPM | WEIGHT: 125.66 LBS | DIASTOLIC BLOOD PRESSURE: 71 MMHG | SYSTOLIC BLOOD PRESSURE: 133 MMHG | HEART RATE: 58 BPM | OXYGEN SATURATION: 94 % | BODY MASS INDEX: 20.94 KG/M2

## 2018-06-14 PROBLEM — N17.9 AKI (ACUTE KIDNEY INJURY) (HCC): Status: RESOLVED | Noted: 2018-06-13 | Resolved: 2018-06-14

## 2018-06-14 PROBLEM — I10 HIGH BLOOD PRESSURE: Status: RESOLVED | Noted: 2018-06-13 | Resolved: 2018-06-14

## 2018-06-14 LAB
ANION GAP SERPL CALC-SCNC: 5 MMOL/L (ref 0–11.9)
BUN SERPL-MCNC: 16 MG/DL (ref 8–22)
CALCIUM SERPL-MCNC: 9.3 MG/DL (ref 8.4–10.2)
CHLORIDE SERPL-SCNC: 108 MMOL/L (ref 96–112)
CO2 SERPL-SCNC: 28 MMOL/L (ref 20–33)
CREAT SERPL-MCNC: 0.9 MG/DL (ref 0.5–1.4)
ERYTHROCYTE [DISTWIDTH] IN BLOOD BY AUTOMATED COUNT: 43.2 FL (ref 35.9–50)
GLUCOSE SERPL-MCNC: 107 MG/DL (ref 65–99)
HCT VFR BLD AUTO: 39.6 % (ref 37–47)
HGB BLD-MCNC: 12.9 G/DL (ref 12–16)
MCH RBC QN AUTO: 29.6 PG (ref 27–33)
MCHC RBC AUTO-ENTMCNC: 32.6 G/DL (ref 33.6–35)
MCV RBC AUTO: 90.8 FL (ref 81.4–97.8)
PLATELET # BLD AUTO: 243 K/UL (ref 164–446)
PMV BLD AUTO: 10 FL (ref 9–12.9)
POTASSIUM SERPL-SCNC: 4 MMOL/L (ref 3.6–5.5)
RBC # BLD AUTO: 4.36 M/UL (ref 4.2–5.4)
SODIUM SERPL-SCNC: 141 MMOL/L (ref 135–145)
TROPONIN I SERPL-MCNC: <0.02 NG/ML (ref 0–0.04)
WBC # BLD AUTO: 7 K/UL (ref 4.8–10.8)

## 2018-06-14 PROCEDURE — G0378 HOSPITAL OBSERVATION PER HR: HCPCS

## 2018-06-14 PROCEDURE — A9502 TC99M TETROFOSMIN: HCPCS

## 2018-06-14 PROCEDURE — 85027 COMPLETE CBC AUTOMATED: CPT

## 2018-06-14 PROCEDURE — 700102 HCHG RX REV CODE 250 W/ 637 OVERRIDE(OP): Performed by: INTERNAL MEDICINE

## 2018-06-14 PROCEDURE — A9270 NON-COVERED ITEM OR SERVICE: HCPCS | Performed by: INTERNAL MEDICINE

## 2018-06-14 PROCEDURE — 700111 HCHG RX REV CODE 636 W/ 250 OVERRIDE (IP)

## 2018-06-14 PROCEDURE — 80048 BASIC METABOLIC PNL TOTAL CA: CPT

## 2018-06-14 PROCEDURE — 84484 ASSAY OF TROPONIN QUANT: CPT

## 2018-06-14 PROCEDURE — 99217 PR OBSERVATION CARE DISCHARGE: CPT | Performed by: HOSPITALIST

## 2018-06-14 PROCEDURE — 36415 COLL VENOUS BLD VENIPUNCTURE: CPT

## 2018-06-14 RX ORDER — ACYCLOVIR 400 MG/1
400 TABLET ORAL 2 TIMES DAILY
COMMUNITY
Start: 2018-06-11 | End: 2021-05-07

## 2018-06-14 RX ORDER — ACETAMINOPHEN 160 MG
2000 TABLET,DISINTEGRATING ORAL EVERY EVENING
COMMUNITY

## 2018-06-14 RX ORDER — LEVOTHYROXINE SODIUM 0.07 MG/1
75 TABLET ORAL
COMMUNITY
End: 2021-05-07

## 2018-06-14 RX ORDER — REGADENOSON 0.08 MG/ML
INJECTION, SOLUTION INTRAVENOUS
Status: COMPLETED
Start: 2018-06-14 | End: 2018-06-14

## 2018-06-14 RX ORDER — AMLODIPINE BESYLATE 5 MG/1
5 TABLET ORAL DAILY
Qty: 30 TAB | Refills: 0 | Status: SHIPPED | OUTPATIENT
Start: 2018-06-15 | End: 2021-05-07

## 2018-06-14 RX ORDER — LEVOTHYROXINE SODIUM 0.07 MG/1
75 TABLET ORAL DAILY
Status: DISCONTINUED | OUTPATIENT
Start: 2018-06-14 | End: 2018-06-14 | Stop reason: HOSPADM

## 2018-06-14 RX ORDER — DOXEPIN HYDROCHLORIDE 10 MG/1
CAPSULE ORAL
Status: COMPLETED
Start: 2018-06-14 | End: 2018-06-14

## 2018-06-14 RX ADMIN — REGADENOSON 0.4 MG: 0.08 INJECTION, SOLUTION INTRAVENOUS at 10:39

## 2018-06-14 RX ADMIN — DOXEPIN HYDROCHLORIDE 10 MG: 10 CAPSULE ORAL at 00:44

## 2018-06-14 ASSESSMENT — LIFESTYLE VARIABLES
HOW MANY TIMES IN THE PAST YEAR HAVE YOU HAD 5 OR MORE DRINKS IN A DAY: 0
TOTAL SCORE: 0
TOTAL SCORE: 0
ALCOHOL_USE: YES
ON A TYPICAL DAY WHEN YOU DRINK ALCOHOL HOW MANY DRINKS DO YOU HAVE: 1
HAVE YOU EVER FELT YOU SHOULD CUT DOWN ON YOUR DRINKING: NO
AVERAGE NUMBER OF DAYS PER WEEK YOU HAVE A DRINK CONTAINING ALCOHOL: 1
EVER HAD A DRINK FIRST THING IN THE MORNING TO STEADY YOUR NERVES TO GET RID OF A HANGOVER: NO
EVER FELT BAD OR GUILTY ABOUT YOUR DRINKING: NO
CONSUMPTION TOTAL: NEGATIVE
TOTAL SCORE: 0
HAVE PEOPLE ANNOYED YOU BY CRITICIZING YOUR DRINKING: NO

## 2018-06-14 ASSESSMENT — PATIENT HEALTH QUESTIONNAIRE - PHQ9
1. LITTLE INTEREST OR PLEASURE IN DOING THINGS: NOT AT ALL
SUM OF ALL RESPONSES TO PHQ9 QUESTIONS 1 AND 2: 0
2. FEELING DOWN, DEPRESSED, IRRITABLE, OR HOPELESS: NOT AT ALL

## 2018-06-14 NOTE — DISCHARGE SUMMARY
"Discharge Summary    CHIEF COMPLAINT ON ADMISSION  Chief Complaint   Patient presents with   • Palpitations   • Chest Pain     described as \"a burning in my chest\"   • Tingling     in arms and neck, described as pins and needles       Reason for Admission  sent by MD; chest discomfort     Admission Date  6/13/2018    CODE STATUS  Full Code    HPI & HOSPITAL COURSE   is a very pleasant 68 y.o. Female admitted for evaluation of chest pain. On admission patient received an EKG and cardiac serial troponin's were followed which was grossly normal. In addition we ordered  a nuclear stress test for further risk stratification which was grossly normal. Patient's chest discomfort has resolved. Patient denies fevers/chills, chest pain, shortness of breath or nausea/vommiting.            Therefore, she is discharged in good and stable condition to home with close outpatient follow-up.    The patient recovered much more quickly than anticipated on admission.    Discharge Date  6/14/2018     FOLLOW UP ITEMS POST DISCHARGE  PCP in 1 week    DISCHARGE DIAGNOSES  Active Problems:    Hypothyroid POA: Yes    Hyperglycemia POA: Yes  Resolved Problems:    Chest pain POA: Yes    JIMI (acute kidney injury) (HCC) POA: Yes    High blood pressure POA: Yes      FOLLOW UP  No future appointments.  No follow-up provider specified.    MEDICATIONS ON DISCHARGE     Medication List      START taking these medications      Instructions   amLODIPine 5 MG Tabs  Start taking on:  6/15/2018  Commonly known as:  NORVASC   Take 1 Tab by mouth every day.  Dose:  5 mg        CONTINUE taking these medications      Instructions   5-HTP PO   Take 1 Tab by mouth every day.  Dose:  1 Tab     aspirin EC 81 MG Tbec  Commonly known as:  ECOTRIN   Take 81 mg by mouth every evening.  Dose:  81 mg     B-12 SL   Place 5,000 mcg under tongue every day.  Dose:  5000 mcg     CO Q 10 PO   Take 300 mg by mouth every day.  Dose:  300 mg     * COMPLETE ENERGY PO   Take " 1 Tab by mouth every day.  Dose:  1 Tab     * PRESERVISION AREDS 2 PO   Take 1 Cap by mouth every day.  Dose:  1 Cap     CRANBERRY PO   Take 300 mg by mouth every day.  Dose:  300 mg     KYARA C PO   Take 1,000 mg by mouth every day.  Dose:  1000 mg     FLAX SEEDS PO   Take 3,400 mg by mouth every day.  Dose:  3400 mg     GRAPESEED EXTRACT PO   Take 300 mg by mouth every day.  Dose:  300 mg     levothyroxine 75 MCG Tabs  Commonly known as:  SYNTHROID   Take 75 mcg by mouth Every morning on an empty stomach.  Dose:  75 mcg     LUTEIN-ZEAXANTHIN PO   Take 1 Tab by mouth every day.  Dose:  1 Tab     MSM PO   Take 1 Tab by mouth every day.  Dose:  1 Tab     Non Formulary Request   Take 1 Cap by mouth every day. Biosil (OTC)  Dose:  1 Cap     Non Formulary Request   Take 1 Tab by mouth every day. Green and Red (OTC)  Dose:  1 Tab     PROBIOTIC DAILY PO   Take 1 Cap by mouth every day.  Dose:  1 Cap     DIVINA-E PO   Take 400 mg by mouth every day.  Dose:  400 mg     simvastatin 10 MG Tabs  Commonly known as:  ZOCOR   Take 10 mg by mouth every day.  Dose:  10 mg     SINEQUAN PO   Take 15 mL by mouth every evening.  Dose:  15 mL     TRIPLE FLEX PO   Take 3 g by mouth every day.  Dose:  3 g     TURMERIC PO   Take 900 mg by mouth every day.  Dose:  900 mg     Vitamin D3 2000 UNIT Caps   Take 1 Cap by mouth every evening.  Dose:  1 Cap     ZOVIRAX 400 MG tablet  Generic drug:  acyclovir   Take 400 mg by mouth 2 times a day. Pt started on 6/11/2018  Dose:  400 mg        * This list has 2 medication(s) that are the same as other medications prescribed for you. Read the directions carefully, and ask your doctor or other care provider to review them with you.                Allergies  Allergies   Allergen Reactions   • Other Food Anaphylaxis and Hives     Strawberry HIVES  Yellow wallace (ANAPHYLAXIS)     • Codeine Vomiting and Nausea       DIET  Orders Placed This Encounter   Procedures   • Protocol 1313 Diet Order: Reg, No Decaf, No  Caffeine - Cardiac Stress Test Pharmacological     Standing Status:   Standing     Number of Occurrences:   1     Order Specific Question:   Diet:     Answer:   Regular [1]     Order Specific Question:   Miscellaneous modifications:     Answer:   No Decaf, No Caffeine(for test) [11]     Comments:   Protocol 1313 Patient to have no caffeine for 12 hours prior to exam (decaf, coffee, cola, tea, chocolate)       ACTIVITY  As tolerated.  Weight bearing as tolerated    CONSULTATIONS  None    PROCEDURES  None    LABORATORY  Lab Results   Component Value Date    SODIUM 141 06/14/2018    POTASSIUM 4.0 06/14/2018    CHLORIDE 108 06/14/2018    CO2 28 06/14/2018    GLUCOSE 107 (H) 06/14/2018    BUN 16 06/14/2018    CREATININE 0.90 06/14/2018        Lab Results   Component Value Date    WBC 7.0 06/14/2018    HEMOGLOBIN 12.9 06/14/2018    HEMATOCRIT 39.6 06/14/2018    PLATELETCT 243 06/14/2018        Total time of the discharge process exceeds 35 minutes.

## 2018-06-14 NOTE — PROGRESS NOTES
Pt arrived via gurney, admitted to room 203-2 from ER. Pt is A&Ox4, pt denied pain. Pt denied to take Heparin due to potential side effects of the med - regardless the explanation from this RN. Pt stated that she stopped taking Lipitor for 2 years because her body is allergic to the med & refused to take this med. Informed pt that she will have stress test in AM and will be NPO at midnight. Oriented to room call light and smoking policy. Reviewed plan of care with the patient and the family. Fall precaution in place. Bed locked & at lowest position. Call light & personal belongings within reach; pt understanding to call for any assistance. Will continue to monitor

## 2018-06-14 NOTE — ED NOTES
"Chief Complaint   Patient presents with   • Palpitations   • Chest Pain     described as \"a burning in my chest\"   • Tingling     in arms and neck, described as pins and needles     /92   Pulse 98   Resp 16   Ht 1.651 m (5' 5\")   Wt 57 kg (125 lb 10.6 oz)   SpO2 95%   BMI 20.91 kg/m²     "

## 2018-06-14 NOTE — PROGRESS NOTES
Nursing care plan includes knowledge deficit, potential for discomfort, potential for compromised cardiac output.  POC includes teaching, comfort measures and reassurance, and access to code cart, cardiology stand by and availability of rapid response team.  Pt verbalizes good understanding of benefits and risks of pharmacological cardiac stress test.  Informed consent obtained.  Lexiscan given, pt developed the following signs/symptoms:sob, stomach and neck pressure..    VS stable, symptoms resolved.  To waiting room, fluids and/or snack given, awaiting second scan.  Nursing goals met.

## 2018-06-14 NOTE — ED NOTES
Line and labs were done in triage. Pt describes a lot of personal stress recently with  being hospitalized, bouts of chills and HTN this week.

## 2018-06-14 NOTE — PROGRESS NOTES
Tele Strip at 2122 shows SR at 71.       Measurements from am strip were as follows:  FL=0.16  QRS=0.08  QT=0.36     Tele Shift Summary:     Rhythm : SR/SB  Rate : 50s-80s  Ectopy : Per CCT [place], pt had no ectopy.      Telemetry monitoring strips placed in pt chart.

## 2018-06-14 NOTE — ED NOTES
1712:  PIV placed in LAC, blood drawn and sent to lab  Pt states spouse was just discharged from the Hospital, went to see PCP today for appointment, sent here by PCP for further workup

## 2018-06-14 NOTE — ED NOTES
Report received from Archana WINTER. Assumed patient care. Pt assesement done.  Plan of care reviewed with patient.

## 2018-06-14 NOTE — ED PROVIDER NOTES
"ED Provider Note    CHIEF COMPLAINT  Chief Complaint   Patient presents with   • Palpitations   • Chest Pain     described as \"a burning in my chest\"   • Tingling     in arms and neck, described as pins and needles       HPI  Miranda Rose is a 68 y.o. female who presents to the emergency department complaining of chest discomfort as well as \"pins and needles \"of anterior neck and left arm. She notes that she had picked up her  from a recent hospital stay and was driving to get prescriptions when she had sudden onset of feeling of palpitations, tachycardia as well as the descriptors as noted above. She then called her primary medical physician Dr. Thomason and went to his office for EKG was completed and she further discuss her symptomatology. He then drove her here to the ER for further cardiac evaluation. The patient does have prior cardiac evaluation locally with Dr. Kapoor with echocardiogram and possible stress test 2 years ago to which the patient reports as negative. Additionally she has has had prior prior CT  for calcification classification. She had a calcium score of 90.    REVIEW OF SYSTEMS  See HPI for further details. All other systems are negative.     PAST MEDICAL HISTORY   has a past medical history of Anxiety; Lenin Barr virus infection; Herpes; High blood pressure (6/13/2018); Hypercholesteremia; Hypothyroid; and Thyroid disease.    SOCIAL HISTORY  Social History     Social History Main Topics   • Smoking status: Former Smoker   • Smokeless tobacco: Never Used   • Alcohol use 1.0 oz/week     2 Glasses of wine per week   • Drug use: No   • Sexual activity: Not on file       SURGICAL HISTORY   has a past surgical history that includes lumpectomy; hip replacement, total; abdominal hysterectomy total; and primary c section.    CURRENT MEDICATIONS  Home Medications     Reviewed by Pao Lynch R.N. (Registered Nurse) on 06/13/18 at 2303  Med List Status: Complete   Medication Last Dose " "Status   atorvastatin (LIPITOR) 20 MG Tab 10/1/2015 Active   doxepin (SINEQUAN) 10 MG CAPS 6/12/2018 Active   levothyroxine (SYNTHROID) 100 MCG TABS 6/13/2018 Active   liothyronine (CYTOMEL) 5 MCG TABS 10/1/2015 Active   multivitamin (THERAGRAN) TABS 6/13/2018 Active   oxycodone-acetaminophen (PERCOCET) 5-325 MG Tab  Active   simvastatin (ZOCOR) 10 MG Tab 6/13/2018 Active                ALLERGIES  Allergies   Allergen Reactions   • Codeine Nausea   • Other Food Nausea     Strawberry and yellow wallace         PHYSICAL EXAM  VITAL SIGNS: /92   Pulse 68   Resp 17   Ht 1.651 m (5' 5\")   Wt 57 kg (125 lb 10.6 oz)   SpO2 95%   BMI 20.91 kg/m²  @MARISSA[[id number]::@   Pulse ox interpretation: I interpret this pulse ox as normal.  Constitutional: Alert in no apparent distress.  HENT: No signs of trauma, Bilateral external ears normal, Nose normal.   Eyes: Slight ocular proptosis Pupils are equal and reactive, Conjunctiva normal, Non-icteric.   Neck: Normal range of motion, No tenderness, Supple, No stridor.    Cardiovascular: Regular rate and rhythm, no murmurs.   Thorax & Lungs: Normal breath sounds, No respiratory distress, No wheezing, No chest tenderness.   Abdomen: Bowel sounds normal, Soft, No tenderness, No masses, No pulsatile masses. No peritoneal signs.  Skin: Warm, Dry, No erythema, No rash.   Back: No bony tenderness, No CVA tenderness.   Extremities: Intact distal pulses, No edema, No tenderness, No cyanosis,  Negative Benito's sign.   Musculoskeletal: Good range of motion in all major joints. No tenderness to palpation or major deformities noted.   Neurologic: Alert , Normal motor function, Normal sensory function, No focal deficits noted.   Psychiatric: Affect normal, Judgment normal, Mood normal.       DIAGNOSTIC STUDIES / PROCEDURES    EKG  1704: Sinus rhythm at a rate of 90, normal axis, normal intervals, , , nonspecific ST changes with inferior and septal lateral depressions and minimal ST " elevations V1 and V2    LABS  Results for orders placed or performed during the hospital encounter of 06/13/18   Troponin   Result Value Ref Range    Troponin I <0.02 0.00 - 0.04 ng/mL   Btype Natriuretic Peptide   Result Value Ref Range    B Natriuretic Peptide 15 0 - 100 pg/mL   CBC with Differential   Result Value Ref Range    WBC 6.5 4.8 - 10.8 K/uL    RBC 4.50 4.20 - 5.40 M/uL    Hemoglobin 13.4 12.0 - 16.0 g/dL    Hematocrit 40.8 37.0 - 47.0 %    MCV 90.7 81.4 - 97.8 fL    MCH 29.8 27.0 - 33.0 pg    MCHC 32.8 (L) 33.6 - 35.0 g/dL    RDW 43.8 35.9 - 50.0 fL    Platelet Count 264 164 - 446 K/uL    MPV 10.1 9.0 - 12.9 fL    Neutrophils-Polys 60.90 44.00 - 72.00 %    Lymphocytes 31.90 22.00 - 41.00 %    Monocytes 6.50 0.00 - 13.40 %    Eosinophils 0.30 0.00 - 6.90 %    Basophils 0.20 0.00 - 1.80 %    Immature Granulocytes 0.20 0.00 - 0.90 %    Nucleated RBC 0.00 /100 WBC    Neutrophils (Absolute) 3.96 2.00 - 7.15 K/uL    Lymphs (Absolute) 2.07 1.00 - 4.80 K/uL    Monos (Absolute) 0.42 0.00 - 0.85 K/uL    Eos (Absolute) 0.02 0.00 - 0.51 K/uL    Baso (Absolute) 0.01 0.00 - 0.12 K/uL    Immature Granulocytes (abs) 0.01 0.00 - 0.11 K/uL    NRBC (Absolute) 0.00 K/uL   Complete Metabolic Panel (CMP)   Result Value Ref Range    Sodium 138 135 - 145 mmol/L    Potassium 3.7 3.6 - 5.5 mmol/L    Chloride 104 96 - 112 mmol/L    Co2 26 20 - 33 mmol/L    Anion Gap 8.0 0.0 - 11.9    Glucose 166 (H) 65 - 99 mg/dL    Bun 18 8 - 22 mg/dL    Creatinine 1.11 0.50 - 1.40 mg/dL    Calcium 9.7 8.4 - 10.2 mg/dL    AST(SGOT) 25 12 - 45 U/L    ALT(SGPT) 22 2 - 50 U/L    Alkaline Phosphatase 65 30 - 99 U/L    Total Bilirubin 0.5 0.1 - 1.5 mg/dL    Albumin 4.7 3.2 - 4.9 g/dL    Total Protein 7.1 6.0 - 8.2 g/dL    Globulin 2.4 1.9 - 3.5 g/dL    A-G Ratio 2.0 g/dL   Prothrombin Time   Result Value Ref Range    PT 12.4 12.0 - 14.6 sec    INR 0.93 0.87 - 1.13   APTT   Result Value Ref Range    APTT 32.6 24.7 - 36.0 sec   Lipase   Result Value  Ref Range    Lipase 33 7 - 58 U/L   ESTIMATED GFR   Result Value Ref Range    GFR If  59 (A) >60 mL/min/1.73 m 2    GFR If Non African American 49 (A) >60 mL/min/1.73 m 2   T3 FREE   Result Value Ref Range    T3,Free 3.33 2.40 - 4.20 pg/mL   FREE THYROXINE   Result Value Ref Range    Free T-4 1.15 0.58 - 1.64 ng/dL   TSH   Result Value Ref Range    TSH 1.460 0.380 - 5.330 uIU/mL   Troponin - STAT Once   Result Value Ref Range    Troponin I <0.02 0.00 - 0.04 ng/mL   HEMOGLOBIN A1C   Result Value Ref Range    Glycohemoglobin 5.6 0.0 - 5.6 %    Est Avg Glucose 114 mg/dL   EKG (ER)   Result Value Ref Range    Report       [facility] Emergency Dept.    Test Date:  2018  Pt Name:    ALEXANDER OLIVAS                 Department: [facility]  MRN:        [id number]                      Room:  Gender:     Female                       Technician: [id number]  :        [id number]                   Requested By:ER TRIAGE PROTOCOL  Order #:    [id number]                    Reading MD:    Measurements  Intervals                                Axis  Rate:       90                           P:          80  ID:         [id number]                          QRS:        59  QRSD:       62                           T:          38  QT:         338  QTc:        414    Interpretive Statements  Sinus rhythm  Anteroseptal infarct, age indeterminate  Compared to ECG 10/02/2015 05:11:47  No significant changes           RADIOLOGY  DX-CHEST-LIMITED (1 VIEW)   Final Result         1.  Opacity in the mid right hemithorax which could be related to callus on anterior right fifth rib, lung parenchymal abnormality, or superimposed soft tissue density.      2.  Bilateral lung hyperinflation which appears increased when compared to previous exam.      NM-CARDIAC STRESS TEST    (Results Pending)         Heart score equal to 6    COURSE & MEDICAL DECISION MAKING  Pertinent Labs & Imaging studies reviewed. (See chart for  details)  Patient presented to the emergency department after seeing primary care physician with the above complaints. Patient does have a mild to moderate coronary calcium score and additionally she has a moderate heart score for ACS. While the patient's EKG is nonspecific lab evaluation is within normal limits. Patient does have a history of thyroid arrangements of the studies have been added as she does have upcoming planned testing for the same. The patient does have intermittent palpitations of this may have been the etiology although now with normal thyroid functions this is less likely. While the patient has had remote cardiac evaluation do believe that given her current symptomatology and reoccurrence of feeling well as she will require ongoing inpatient cardiac evaluation and monitoring. I discussed the case with Hospital services which revealed ongoing inpatient care. Additionally had discussed the case with the patient's primary care physician to notify him of current care plan.     The patient will return for worsening symptoms and is stable at the time of discharge. The patient verbalizes understanding and will comply.    FINAL IMPRESSION  1. Atypical chest pain            Electronically signed by: Pedro Smith, 6/13/2018 6:10 PM

## 2018-06-14 NOTE — ED NOTES
To and from the bathroom, monitor reapplied.  asking to speak with the erp regarding admission. Bedside report to David.

## 2018-06-14 NOTE — PROGRESS NOTES
Bedside report received by NOC RN. Pt A&OX4, VS stable. Pt in no acute distress. No c/o CP, dizziness, or SOB at this time. Bed rails up X 2, call light and belongings within reach, patient encouraged to call for assistance. No needs at this time. Will continue to monitor.

## 2018-06-14 NOTE — H&P
" Hospital Medicine History and Physical      Date of Service  6/13/2018    Chief Complaint  Chief Complaint   Patient presents with   • Palpitations   • Chest Pain     described as \"a burning in my chest\"   • Tingling     in arms and neck, described as pins and needles       History of Presenting Illness  Milton is a 68 y.o. female PMH of DLD, hypothryoid, who presents with chest pain.  She stated that it started on Sunday while she was planning to go to bed.  And she has another episode of chest pain today where she was driving.  Never had similar symptom in the past.  She described the pain as burning pain over her sternum area, associated with tingling numbness sensation over her neck and left upper extremity.  Currently the patient is chest pain-free.  She has history of stress test done 2 years ago and it was negative.  She will be admitted for observation.    Primary Care Physician  Pcp Not In Computer      Code Status  Full code    Review of Systems  Review of Systems   Constitutional: Negative for chills, fever and weight loss.   HENT: Negative for congestion and nosebleeds.    Eyes: Negative for blurred vision, pain, discharge and redness.   Respiratory: Negative for cough, sputum production, shortness of breath and stridor.    Cardiovascular: Positive for chest pain. Negative for palpitations and orthopnea.   Gastrointestinal: Negative for abdominal pain, diarrhea, heartburn, nausea and vomiting.   Genitourinary: Negative for dysuria, frequency and urgency.   Musculoskeletal: Negative for back pain, myalgias and neck pain.   Skin: Negative for itching and rash.   Neurological: Negative for dizziness, focal weakness, seizures and headaches.   Psychiatric/Behavioral: Negative for depression. The patient is not nervous/anxious and does not have insomnia.      Please see HPI, all other systems were reviewed and are negative (AMA/CMS criteria)     Past Medical History  Past Medical History:   Diagnosis Date   • " Anxiety    • Lenin [place]obato virus infection    • Herpes    • Hypercholesteremia    • Hypothyroid    • Thyroid disease        Surgical History  Past Surgical History:   Procedure Laterality Date   • ABDOMINAL HYSTERECTOMY TOTAL     • HIP REPLACEMENT, TOTAL     • LUMPECTOMY     • PRIMARY C SECTION         Medications  No current facility-administered medications on file prior to encounter.      Current Outpatient Prescriptions on File Prior to Encounter   Medication Sig Dispense Refill   • oxycodone-acetaminophen (PERCOCET) 5-325 MG Tab Take 0.5 Tabs by mouth every four hours as needed. 5 Tab 0   • simvastatin (ZOCOR) 10 MG Tab Take 10 mg by mouth every evening.     • atorvastatin (LIPITOR) 20 MG Tab Take 20 mg by mouth every evening.     • levothyroxine (SYNTHROID) 100 MCG TABS Take 88 mcg by mouth every day.     • liothyronine (CYTOMEL) 5 MCG TABS Take 5 mcg by mouth 2 Times a Day.     • doxepin (SINEQUAN) 10 MG CAPS Take 15 mg by mouth every evening.     • multivitamin (THERAGRAN) TABS Take 1 Tab by mouth every day.         Family History  Family History   Problem Relation Age of Onset   • Hyperlipidemia Mother    • Hypertension Mother    • Diabetes Mother    • Hyperlipidemia Father    • Hypertension Father    • Diabetes Father    • Cancer Father          Social History  Social History   Substance Use Topics   • Smoking status: Former Smoker   • Smokeless tobacco: Never Used   • Alcohol use 1.0 oz/week     2 Glasses of wine per week       Allergies  Allergies   Allergen Reactions   • Codeine Nausea   • Other Food Nausea     Strawberry and yellow wallace          Physical Exam  Laboratory   Hemodynamics  No data recorded.      Pulse  Av  Min: 76  Max: 98 Heart Rate (Monitored): 75  Blood Pressure : 149/92, NIBP: 145/80      Respiratory      Respiration: 16, Pulse Oximetry: 94 %             Physical Exam   Constitutional: She is oriented to person, place, and time. No distress.   HENT:   Head: Normocephalic and  atraumatic.   Mouth/Throat: Oropharynx is clear and moist.   Eyes: Conjunctivae and EOM are normal. Pupils are equal, round, and reactive to light.   Neck: Normal range of motion. Neck supple. No tracheal deviation present. No thyromegaly present.   Cardiovascular: Regular rhythm.    No murmur heard.  Tachycardic   Pulmonary/Chest: Effort normal and breath sounds normal. No respiratory distress. She has no wheezes.   Abdominal: Soft. Bowel sounds are normal. She exhibits no distension. There is no tenderness.   Musculoskeletal: She exhibits no edema or tenderness.   Neurological: She is alert and oriented to person, place, and time. No cranial nerve deficit.   Skin: Skin is warm and dry. She is not diaphoretic. No erythema.   Psychiatric: She has a normal mood and affect. Her behavior is normal. Thought content normal.       Recent Labs      06/13/18   1712   WBC  6.5   RBC  4.50   HEMOGLOBIN  13.4   HEMATOCRIT  40.8   MCV  90.7   MCH  29.8   MCHC  32.8*   RDW  43.8   PLATELETCT  264   MPV  10.1     Recent Labs      06/13/18   1712   SODIUM  138   POTASSIUM  3.7   CHLORIDE  104   CO2  26   GLUCOSE  166*   BUN  18   CREATININE  1.11   CALCIUM  9.7     Recent Labs      06/13/18   1712   ALTSGPT  22   ASTSGOT  25   ALKPHOSPHAT  65   TBILIRUBIN  0.5   LIPASE  33   GLUCOSE  166*     Recent Labs      06/13/18   1712   APTT  32.6   INR  0.93     Recent Labs      06/13/18   1712   BNPBTYPENAT  15         Lab Results   Component Value Date    TROPONINI <0.02 06/13/2018       Imaging  DX-CHEST-LIMITED (1 VIEW)   Final Result         1.  Opacity in the mid right hemithorax which could be related to callus on anterior right fifth rib, lung parenchymal abnormality, or superimposed soft tissue density.      2.  Bilateral lung hyperinflation which appears increased when compared to previous exam.      NM-CARDIAC STRESS TEST    (Results Pending)     EKG  per my independant read:  QTc: 414, HR: 90, sinus tachycardia, no ST/T  changes     Assessment/Plan     I anticipate this patient is appropriate for observation status at this time.    High blood pressure- (present on admission)   Assessment & Plan    No history of hypertension  SBP is above 150  We will start her on amlodipine        Hyperglycemia- (present on admission)   Assessment & Plan    Check a1c        JIMI (acute kidney injury) (HCC)- (present on admission)   Assessment & Plan    Likely prerenal  Avoid nephrotoxic drugs  Follow cmp        Hypothyroid- (present on admission)   Assessment & Plan    On Synthroid, liothyronine  Thyroid function tests normal        Chest pain- (present on admission)   Assessment & Plan    Initial works up: negative  Trend trop and ekg  NPO  Stress test in am  Asa, statin            Prophylaxis:  lovenox

## 2018-06-14 NOTE — DISCHARGE INSTRUCTIONS
Discharge Instructions    Discharged to home by car with relative. Discharged via wheelchair, hospital escort: Yes.  Special equipment needed: Not Applicable    Be sure to schedule a follow-up appointment with your primary care doctor or any specialists as instructed.     Discharge Plan:   Diet Plan: Discussed  Activity Level: Discussed  Confirmed Follow up Appointment: Patient to Call and Schedule Appointment  Medication Reconciliation Updated: Yes  Pneumococcal Vaccine Administered/Refused: Not given - Patient refused pneumococcal vaccine  Influenza Vaccine Indication: Patient Refuses    I understand that a diet low in cholesterol, fat, and sodium is recommended for good health. Unless I have been given specific instructions below for another diet, I accept this instruction as my diet prescription.   Other diet: reg    Special Instructions: None    · Is patient discharged on Warfarin / Coumadin?   No     Depression / Suicide Risk    As you are discharged from this [facility] facility, it is important to learn how to keep safe from harming yourself.    Recognize the warning signs:  · Abrupt changes in personality, positive or negative- including increase in energy   · Giving away possessions  · Change in eating patterns- significant weight changes-  positive or negative  · Change in sleeping patterns- unable to sleep or sleeping all the time   · Unwillingness or inability to communicate  · Depression  · Unusual sadness, discouragement and loneliness  · Talk of wanting to die  · Neglect of personal appearance   · Rebelliousness- reckless behavior  · Withdrawal from people/activities they love  · Confusion- inability to concentrate     If you or a loved one observes any of these behaviors or has concerns about self-harm, here's what you can do:  · Talk about it- your feelings and reasons for harming yourself  · Remove any means that you might use to hurt yourself (examples: pills, rope, extension cords,  firearm)  · Get professional help from the community (Mental Health, Substance Abuse, psychological counseling)  · Do not be alone:Call your Safe Contact- someone whom you trust who will be there for you.  · Call your local CRISIS HOTLINE 338-2658 or 222-113-5973  · Call your local Children's Mobile Crisis Response Team [place] (902) 437-5250 or www.vip.com  · Call the toll free National Suicide Prevention Hotlines   · National Suicide Prevention Lifeline 222-557-TMEZ (8229)  · National Hope Line Network 800-SUICIDE (537-5563)

## 2018-06-14 NOTE — CARE PLAN
Problem: Knowledge Deficit  Goal: Knowledge of disease process/condition, treatment plan, diagnostic tests, and medications will improve  Outcome: PROGRESSING AS EXPECTED  Pt NPO since midnight for stress test this AM; pt being informed     Problem: Pain Management  Goal: Pain level will decrease to patient's comfort goal  Outcome: PROGRESSING AS EXPECTED  Pt denied chest pain. Informed pt to report to the nurse if experiencing pain; pt verbalized understanding

## 2018-06-28 ENCOUNTER — HOSPITAL ENCOUNTER (OUTPATIENT)
Dept: LAB | Facility: MEDICAL CENTER | Age: 68
End: 2018-06-28
Attending: EMERGENCY MEDICINE
Payer: MEDICARE

## 2018-06-28 LAB
BASOPHILS # BLD AUTO: 0.2 % (ref 0–1.8)
BASOPHILS # BLD: 0.01 K/UL (ref 0–0.12)
CHOLEST SERPL-MCNC: 209 MG/DL (ref 100–199)
EOSINOPHIL # BLD AUTO: 0.02 K/UL (ref 0–0.51)
EOSINOPHIL NFR BLD: 0.5 % (ref 0–6.9)
ERYTHROCYTE [DISTWIDTH] IN BLOOD BY AUTOMATED COUNT: 43.9 FL (ref 35.9–50)
HCT VFR BLD AUTO: 39.8 % (ref 37–47)
HDLC SERPL-MCNC: 91 MG/DL
HGB BLD-MCNC: 13.1 G/DL (ref 12–16)
IMM GRANULOCYTES # BLD AUTO: 0 K/UL (ref 0–0.11)
IMM GRANULOCYTES NFR BLD AUTO: 0 % (ref 0–0.9)
IRON SATN MFR SERPL: 18 % (ref 15–55)
IRON SERPL-MCNC: 64 UG/DL (ref 40–170)
LDLC SERPL CALC-MCNC: 108 MG/DL
LYMPHOCYTES # BLD AUTO: 2.18 K/UL (ref 1–4.8)
LYMPHOCYTES NFR BLD: 51.9 % (ref 22–41)
MCH RBC QN AUTO: 29.9 PG (ref 27–33)
MCHC RBC AUTO-ENTMCNC: 32.9 G/DL (ref 33.6–35)
MCV RBC AUTO: 90.9 FL (ref 81.4–97.8)
MONOCYTES # BLD AUTO: 0.33 K/UL (ref 0–0.85)
MONOCYTES NFR BLD AUTO: 7.9 % (ref 0–13.4)
NEUTROPHILS # BLD AUTO: 1.66 K/UL (ref 2–7.15)
NEUTROPHILS NFR BLD: 39.5 % (ref 44–72)
NRBC # BLD AUTO: 0 K/UL
NRBC BLD-RTO: 0 /100 WBC
PLATELET # BLD AUTO: 254 K/UL (ref 164–446)
PMV BLD AUTO: 10.1 FL (ref 9–12.9)
RBC # BLD AUTO: 4.38 M/UL (ref 4.2–5.4)
T3 SERPL-MCNC: 74.5 NG/DL (ref 60–181)
T4 FREE SERPL-MCNC: 1.05 NG/DL (ref 0.53–1.43)
T4 SERPL-MCNC: 7.6 UG/DL (ref 4–12)
TIBC SERPL-MCNC: 364 UG/DL (ref 250–450)
TRIGL SERPL-MCNC: 51 MG/DL (ref 0–149)
TSH SERPL DL<=0.005 MIU/L-ACNC: 1.6 UIU/ML (ref 0.38–5.33)
WBC # BLD AUTO: 4.2 K/UL (ref 4.8–10.8)

## 2018-06-28 PROCEDURE — 80061 LIPID PANEL: CPT

## 2018-06-28 PROCEDURE — 36415 COLL VENOUS BLD VENIPUNCTURE: CPT

## 2018-06-28 PROCEDURE — 84439 ASSAY OF FREE THYROXINE: CPT

## 2018-06-28 PROCEDURE — 84480 ASSAY TRIIODOTHYRONINE (T3): CPT

## 2018-06-28 PROCEDURE — 85025 COMPLETE CBC W/AUTO DIFF WBC: CPT

## 2018-06-28 PROCEDURE — 83540 ASSAY OF IRON: CPT | Mod: GA

## 2018-06-28 PROCEDURE — 83550 IRON BINDING TEST: CPT | Mod: GA

## 2018-06-28 PROCEDURE — 84443 ASSAY THYROID STIM HORMONE: CPT

## 2018-12-10 ENCOUNTER — HOSPITAL ENCOUNTER (OUTPATIENT)
Dept: RADIOLOGY | Facility: MEDICAL CENTER | Age: 68
End: 2018-12-10
Attending: EMERGENCY MEDICINE
Payer: MEDICARE

## 2018-12-10 DIAGNOSIS — R91.8 ABNORMAL CHEST X-RAY WITH MULTIPLE NODULES: ICD-10-CM

## 2018-12-10 PROCEDURE — 71046 X-RAY EXAM CHEST 2 VIEWS: CPT

## 2019-01-03 ENCOUNTER — HOSPITAL ENCOUNTER (OUTPATIENT)
Dept: LAB | Facility: MEDICAL CENTER | Age: 69
End: 2019-01-03
Attending: EMERGENCY MEDICINE
Payer: MEDICARE

## 2019-01-03 LAB
APPEARANCE UR: CLEAR
BILIRUB UR QL STRIP.AUTO: NEGATIVE
COLOR UR: YELLOW
GLUCOSE UR STRIP.AUTO-MCNC: NEGATIVE MG/DL
KETONES UR STRIP.AUTO-MCNC: NEGATIVE MG/DL
LEUKOCYTE ESTERASE UR QL STRIP.AUTO: NEGATIVE
MICRO URNS: NORMAL
NITRITE UR QL STRIP.AUTO: NEGATIVE
PH UR STRIP.AUTO: 6.5 [PH]
PROT UR QL STRIP: NEGATIVE MG/DL
RBC UR QL AUTO: NEGATIVE
SP GR UR STRIP.AUTO: 1.01
UROBILINOGEN UR STRIP.AUTO-MCNC: 0.2 MG/DL

## 2019-01-03 PROCEDURE — 81003 URINALYSIS AUTO W/O SCOPE: CPT

## 2019-01-03 PROCEDURE — 87086 URINE CULTURE/COLONY COUNT: CPT

## 2019-01-05 LAB
BACTERIA UR CULT: NORMAL
SIGNIFICANT IND 70042: NORMAL
SITE SITE: NORMAL
SOURCE SOURCE: NORMAL

## 2019-05-14 ENCOUNTER — HOSPITAL ENCOUNTER (OUTPATIENT)
Dept: RADIOLOGY | Facility: MEDICAL CENTER | Age: 69
End: 2019-05-14
Attending: EMERGENCY MEDICINE
Payer: MEDICARE

## 2019-05-14 DIAGNOSIS — M85.80 OSTEOPENIA, UNSPECIFIED LOCATION: ICD-10-CM

## 2019-05-14 PROCEDURE — 77080 DXA BONE DENSITY AXIAL: CPT

## 2019-05-30 ENCOUNTER — HOSPITAL ENCOUNTER (OUTPATIENT)
Dept: RADIOLOGY | Facility: MEDICAL CENTER | Age: 69
End: 2019-05-30
Attending: EMERGENCY MEDICINE
Payer: MEDICARE

## 2019-05-30 DIAGNOSIS — R22.40 MASS OF LOWER EXTREMITY, UNSPECIFIED LATERALITY: ICD-10-CM

## 2019-05-30 DIAGNOSIS — M54.2 NECK PAIN: ICD-10-CM

## 2019-05-30 PROCEDURE — 70360 X-RAY EXAM OF NECK: CPT

## 2019-05-30 PROCEDURE — 73590 X-RAY EXAM OF LOWER LEG: CPT | Mod: LT

## 2019-06-07 ENCOUNTER — HOSPITAL ENCOUNTER (OUTPATIENT)
Dept: RADIOLOGY | Facility: MEDICAL CENTER | Age: 69
End: 2019-06-07
Attending: EMERGENCY MEDICINE
Payer: MEDICARE

## 2019-06-07 DIAGNOSIS — M54.2 TENDERNESS OF NECK: ICD-10-CM

## 2019-06-07 PROCEDURE — 76536 US EXAM OF HEAD AND NECK: CPT

## 2019-06-25 ENCOUNTER — HOSPITAL ENCOUNTER (OUTPATIENT)
Dept: RADIOLOGY | Facility: MEDICAL CENTER | Age: 69
End: 2019-06-25
Attending: EMERGENCY MEDICINE
Payer: MEDICARE

## 2019-06-25 DIAGNOSIS — R13.10 DYSPHAGIA, UNSPECIFIED TYPE: ICD-10-CM

## 2019-06-25 DIAGNOSIS — R13.14 DYSPHAGIA, PHARYNGOESOPHAGEAL: ICD-10-CM

## 2019-06-25 PROCEDURE — 74230 X-RAY XM SWLNG FUNCJ C+: CPT

## 2019-06-25 PROCEDURE — 92611 MOTION FLUOROSCOPY/SWALLOW: CPT

## 2019-06-26 NOTE — OP THERAPY EVALUATION
"[facility]  Speech-Language Pathology Department  Modified Barium Swallow Study Summary    Patient: Miranda Rose     Date of Evaluation: 6/25/19    Referring Provider:  Juvencio Thomason (SVETLANA); Fax: [phone]    Radiologist:  Dr. Mendoza    Patient History/Reason for Referral: Pt was referred for MBS due to 'dysphagia, unspecified.' Pt with PMHx notable for thyroid disease, high blood pressure, anxiety and Lenin Barr virus infection. Pt with no SLP hx on file.    Pt arrived unaccompanied to today's appointment. Pt reported, \"my throat is narrower.\" Pt reported concern for possible bone spurs in cervical spine vs. Effects of Lenin Barr virus infection vs. Other cause. Pt reported that she does not eat bread, meat nor potatoes and consumes mainly fruits, vegetables, cheese and chicken. Pt reported no difficulties swallowing pills/vitamins and no coughing/choking associated with drinking liquids.    Pt endorsed hx of PNA x1 within the last two years and hx of xerostomia; however, denied GERD and/or odynophagia. Pt reported hx of hiatal hernia.      Oral Mechanism Exam:   -Dentition: Intact; natural  -Labial: WFL  -Lingual: WFL  -Palatal Elevation: WFL  -Laryngeal Elevation: Complete  -Cough: Adequate  -Vocal Quality: Normal  - Circumlaryngeal Palpation: No c/o pain (however, Pt reported that it felt 'tighter')    Procedure Results:  The examination was conducted with videofluoroscopy from a   Lateral and Anterior view.  The following textures were presented:   Pudding, Cookie and Thin Liquid     Structural Abnormalities:  N/A    The following observations were made:   (WFL unless otherwise noted)    Oral Phase Thin Liquids  Puree Solid   Lip Closure      Tongue Control During Bolus Hold      Premature spillage into the valleculae      Premature spillage into the pyriform sinus      Bolus Preparation/ Mastication      Bolus Transport/ Lingual Motion      Oral Residue after swallow X  Trace, " lingual     Initiation of Pharyngeal Swallow         Other Observations:            Pharyngeal Phase Thin Liquids  Puree Solid   Soft Palate Elevation      Laryngeal Elevation      Anterior Hyoid Excursion      Epiglottic Inversion       Laryngeal Vestibular Closure      Pharyngeal Stripping Wave      Pharyngoesophageal Segment Opening (PES)      Tongue Base Retraction (BOT) and/or BOT Residue      Residue in valleculae X  trace     Residue in piriform sinus(es)      Residue on posterior pharyngeal wall (PPW)      Penetration      Aspiration        Other Observations:  - Mildly slowed movement from upper esophageal sphincter to mid-esophagus      Penetration Aspiration Scale:   Score of 1: Neither penetration nor aspiration  Score of 2-5: Penetration  Score of 6-8: Aspiration     1: Material does not enter airway  2: Material enters airway, but remains above vocal folds; Ejected from airway; no stasis  3: Material remains above vocal folds; visible stasis remains  4: Material contacts vocal folds, but is ejected; no stasis  5: Material contacts vocal folds, and is not ejected; visible stasis remains  6: Material passes glottis, but is ejected from airway; No visible subglottic stasis  7: Material passes glottis, but is not ejected from airway; visible subglottic stasis despite patient’s response  8: Material passes glottis, and is not ejected; visible subglottic stasis; Absent patient response                          Esophageal Phase:  Mid-esophageal retention with retrograde flow below UES                                Impressions:  No penetration nor aspiration appreciated during today's study. Oral dysphagia characterized by trace lingual residue after the swallow (for serial sips of thin liquids only, which is a normal variant given xerostomia.) Pharyngeal dysphagia characterized by trace vallecular residue (for thin liquids. Esophageal swallow phase characterized by mid-esophageal retention with retrograde  flow below UES.        Recommendations: Diet:     Regular       Liquid:     Thin                                        Medications:   Whole with liquid wash            Compensatory Strategies:   Upright 90 degrees, Small bites/sips, Alternate bites/sips, Slow feeding rate, Avoid problematic foods, Remain upright for >30-min after eating/drinking        Your patient may benefit from a referral for:     1.) GI to further evaluate esophageal swallow functioning          Thank you for the referral.    For further questions, please call [phone].       Rebekah Thorpe M.S. CCC-SLP

## 2020-03-13 ENCOUNTER — HOSPITAL ENCOUNTER (OUTPATIENT)
Dept: LAB | Facility: MEDICAL CENTER | Age: 70
End: 2020-03-13
Attending: EMERGENCY MEDICINE
Payer: MEDICARE

## 2020-03-13 LAB
ALBUMIN SERPL BCP-MCNC: 4.5 G/DL (ref 3.2–4.9)
ALBUMIN/GLOB SERPL: 1.8 G/DL
ALP SERPL-CCNC: 58 U/L (ref 30–99)
ALT SERPL-CCNC: 16 U/L (ref 2–50)
ANION GAP SERPL CALC-SCNC: 12 MMOL/L (ref 7–16)
APPEARANCE UR: CLEAR
AST SERPL-CCNC: 19 U/L (ref 12–45)
BASOPHILS # BLD AUTO: 0.2 % (ref 0–1.8)
BASOPHILS # BLD: 0.01 K/UL (ref 0–0.12)
BILIRUB SERPL-MCNC: 0.3 MG/DL (ref 0.1–1.5)
BILIRUB UR QL STRIP.AUTO: NEGATIVE
BUN SERPL-MCNC: 17 MG/DL (ref 8–22)
CALCIUM SERPL-MCNC: 9.5 MG/DL (ref 8.4–10.2)
CHLORIDE SERPL-SCNC: 105 MMOL/L (ref 96–112)
CHOLEST SERPL-MCNC: 218 MG/DL (ref 100–199)
CO2 SERPL-SCNC: 26 MMOL/L (ref 20–33)
COLOR UR: YELLOW
CREAT SERPL-MCNC: 0.73 MG/DL (ref 0.5–1.4)
EOSINOPHIL # BLD AUTO: 0.03 K/UL (ref 0–0.51)
EOSINOPHIL NFR BLD: 0.6 % (ref 0–6.9)
ERYTHROCYTE [DISTWIDTH] IN BLOOD BY AUTOMATED COUNT: 44.7 FL (ref 35.9–50)
ERYTHROCYTE [SEDIMENTATION RATE] IN BLOOD BY WESTERGREN METHOD: 10 MM/HOUR (ref 0–30)
FASTING STATUS PATIENT QL REPORTED: NORMAL
FOLATE SERPL-MCNC: 7.5 NG/ML
GLOBULIN SER CALC-MCNC: 2.5 G/DL (ref 1.9–3.5)
GLUCOSE SERPL-MCNC: 101 MG/DL (ref 65–99)
GLUCOSE UR STRIP.AUTO-MCNC: NEGATIVE MG/DL
HCT VFR BLD AUTO: 41.4 % (ref 37–47)
HDLC SERPL-MCNC: 114 MG/DL
HGB BLD-MCNC: 13.3 G/DL (ref 12–16)
IMM GRANULOCYTES # BLD AUTO: 0.01 K/UL (ref 0–0.11)
IMM GRANULOCYTES NFR BLD AUTO: 0.2 % (ref 0–0.9)
KETONES UR STRIP.AUTO-MCNC: NEGATIVE MG/DL
LDLC SERPL CALC-MCNC: 91 MG/DL
LEUKOCYTE ESTERASE UR QL STRIP.AUTO: NEGATIVE
LYMPHOCYTES # BLD AUTO: 1.71 K/UL (ref 1–4.8)
LYMPHOCYTES NFR BLD: 35.1 % (ref 22–41)
MAGNESIUM SERPL-MCNC: 2.2 MG/DL (ref 1.5–2.5)
MCH RBC QN AUTO: 29 PG (ref 27–33)
MCHC RBC AUTO-ENTMCNC: 32.1 G/DL (ref 33.6–35)
MCV RBC AUTO: 90.2 FL (ref 81.4–97.8)
MICRO URNS: NORMAL
MONOCYTES # BLD AUTO: 0.39 K/UL (ref 0–0.85)
MONOCYTES NFR BLD AUTO: 8 % (ref 0–13.4)
NEUTROPHILS # BLD AUTO: 2.72 K/UL (ref 2–7.15)
NEUTROPHILS NFR BLD: 55.9 % (ref 44–72)
NITRITE UR QL STRIP.AUTO: NEGATIVE
NRBC # BLD AUTO: 0 K/UL
NRBC BLD-RTO: 0 /100 WBC
PH UR STRIP.AUTO: 6.5 [PH] (ref 5–8)
PHOSPHATE SERPL-MCNC: 3.8 MG/DL (ref 2.5–4.5)
PLATELET # BLD AUTO: 224 K/UL (ref 164–446)
PMV BLD AUTO: 10 FL (ref 9–12.9)
POTASSIUM SERPL-SCNC: 4.1 MMOL/L (ref 3.6–5.5)
PROT SERPL-MCNC: 7 G/DL (ref 6–8.2)
PROT UR QL STRIP: NEGATIVE MG/DL
RBC # BLD AUTO: 4.59 M/UL (ref 4.2–5.4)
RBC UR QL AUTO: NEGATIVE
SODIUM SERPL-SCNC: 143 MMOL/L (ref 135–145)
SP GR UR STRIP.AUTO: 1.02
T3 SERPL-MCNC: 102.4 NG/DL (ref 60–181)
T4 FREE SERPL-MCNC: 1.62 NG/DL (ref 0.58–1.64)
T4 SERPL-MCNC: 10.6 UG/DL (ref 4–12)
TRIGL SERPL-MCNC: 63 MG/DL (ref 0–149)
TSH SERPL DL<=0.005 MIU/L-ACNC: 2.72 UIU/ML (ref 0.38–5.33)
VIT B12 SERPL-MCNC: 992 PG/ML (ref 211–911)
WBC # BLD AUTO: 4.9 K/UL (ref 4.8–10.8)

## 2020-03-13 PROCEDURE — 86664 EPSTEIN-BARR NUCLEAR ANTIGEN: CPT

## 2020-03-13 PROCEDURE — 84480 ASSAY TRIIODOTHYRONINE (T3): CPT

## 2020-03-13 PROCEDURE — 86663 EPSTEIN-BARR ANTIBODY: CPT

## 2020-03-13 PROCEDURE — 84439 ASSAY OF FREE THYROXINE: CPT

## 2020-03-13 PROCEDURE — 86644 CMV ANTIBODY: CPT

## 2020-03-13 PROCEDURE — 82746 ASSAY OF FOLIC ACID SERUM: CPT

## 2020-03-13 PROCEDURE — 84443 ASSAY THYROID STIM HORMONE: CPT

## 2020-03-13 PROCEDURE — 84100 ASSAY OF PHOSPHORUS: CPT

## 2020-03-13 PROCEDURE — 86665 EPSTEIN-BARR CAPSID VCA: CPT | Mod: 91

## 2020-03-13 PROCEDURE — 82607 VITAMIN B-12: CPT

## 2020-03-13 PROCEDURE — 83735 ASSAY OF MAGNESIUM: CPT

## 2020-03-13 PROCEDURE — 81003 URINALYSIS AUTO W/O SCOPE: CPT

## 2020-03-13 PROCEDURE — 36415 COLL VENOUS BLD VENIPUNCTURE: CPT

## 2020-03-13 PROCEDURE — 86645 CMV ANTIBODY IGM: CPT

## 2020-03-13 PROCEDURE — 85025 COMPLETE CBC W/AUTO DIFF WBC: CPT

## 2020-03-13 PROCEDURE — 80061 LIPID PANEL: CPT

## 2020-03-13 PROCEDURE — 85652 RBC SED RATE AUTOMATED: CPT

## 2020-03-13 PROCEDURE — 84436 ASSAY OF TOTAL THYROXINE: CPT

## 2020-03-13 PROCEDURE — 80053 COMPREHEN METABOLIC PANEL: CPT

## 2020-03-14 LAB
CMV IGG SERPL IA-ACNC: <0.2 U/ML
CMV IGM SERPL IA-ACNC: <8 AU/ML
EBV EA-D IGG SER-ACNC: <5 U/ML (ref 0–10.9)
EBV NA IGG SER IA-ACNC: 117 U/ML (ref 0–21.9)
EBV VCA IGG SER IA-ACNC: 526 U/ML (ref 0–21.9)
EBV VCA IGM SER IA-ACNC: <10 U/ML (ref 0–43.9)

## 2020-05-27 ENCOUNTER — APPOINTMENT (OUTPATIENT)
Dept: RADIOLOGY | Facility: MEDICAL CENTER | Age: 70
End: 2020-05-27
Attending: PSYCHIATRY & NEUROLOGY
Payer: MEDICARE

## 2020-05-27 DIAGNOSIS — I77.0: ICD-10-CM

## 2020-05-27 PROCEDURE — 70544 MR ANGIOGRAPHY HEAD W/O DYE: CPT

## 2020-06-09 ENCOUNTER — HOSPITAL ENCOUNTER (OUTPATIENT)
Facility: MEDICAL CENTER | Age: 70
End: 2020-06-09
Attending: EMERGENCY MEDICINE
Payer: MEDICARE

## 2020-06-09 LAB — H PYLORI AG STL QL IA: NOT DETECTED

## 2020-06-09 PROCEDURE — 87338 HPYLORI STOOL AG IA: CPT | Mod: GZ

## 2021-01-15 DIAGNOSIS — Z23 NEED FOR VACCINATION: ICD-10-CM

## 2021-01-26 ENCOUNTER — IMMUNIZATION (OUTPATIENT)
Dept: FAMILY PLANNING/WOMEN'S HEALTH CLINIC | Facility: IMMUNIZATION CENTER | Age: 71
End: 2021-01-26
Attending: INTERNAL MEDICINE
Payer: MEDICARE

## 2021-01-26 DIAGNOSIS — Z23 ENCOUNTER FOR VACCINATION: Primary | ICD-10-CM

## 2021-01-26 DIAGNOSIS — Z23 NEED FOR VACCINATION: ICD-10-CM

## 2021-01-26 PROCEDURE — 0001A PFIZER SARS-COV-2 VACCINE: CPT

## 2021-01-26 PROCEDURE — 91300 PFIZER SARS-COV-2 VACCINE: CPT

## 2021-02-18 ENCOUNTER — IMMUNIZATION (OUTPATIENT)
Dept: FAMILY PLANNING/WOMEN'S HEALTH CLINIC | Facility: IMMUNIZATION CENTER | Age: 71
End: 2021-02-18
Attending: INTERNAL MEDICINE
Payer: MEDICARE

## 2021-02-18 DIAGNOSIS — Z23 ENCOUNTER FOR VACCINATION: Primary | ICD-10-CM

## 2021-02-18 PROCEDURE — 91300 PFIZER SARS-COV-2 VACCINE: CPT

## 2021-02-18 PROCEDURE — 0002A PFIZER SARS-COV-2 VACCINE: CPT

## 2021-03-02 ENCOUNTER — HOSPITAL ENCOUNTER (OUTPATIENT)
Dept: LAB | Facility: MEDICAL CENTER | Age: 71
End: 2021-03-02
Attending: EMERGENCY MEDICINE
Payer: MEDICARE

## 2021-03-02 ENCOUNTER — HOSPITAL ENCOUNTER (OUTPATIENT)
Dept: LAB | Facility: MEDICAL CENTER | Age: 71
End: 2021-03-02
Payer: MEDICARE

## 2021-03-02 LAB
ANION GAP SERPL CALC-SCNC: 11 MMOL/L (ref 7–16)
BASOPHILS # BLD AUTO: 0.2 % (ref 0–1.8)
BASOPHILS # BLD: 0.01 K/UL (ref 0–0.12)
BUN SERPL-MCNC: 16 MG/DL (ref 8–22)
C3 SERPL-MCNC: 102.5 MG/DL (ref 87–200)
C4 SERPL-MCNC: 19.3 MG/DL (ref 19–52)
CALCIUM SERPL-MCNC: 9.5 MG/DL (ref 8.5–10.5)
CHLORIDE SERPL-SCNC: 108 MMOL/L (ref 96–112)
CHOLEST SERPL-MCNC: 209 MG/DL (ref 100–199)
CO2 SERPL-SCNC: 28 MMOL/L (ref 20–33)
CREAT SERPL-MCNC: 0.77 MG/DL (ref 0.5–1.4)
EOSINOPHIL # BLD AUTO: 0.02 K/UL (ref 0–0.51)
EOSINOPHIL NFR BLD: 0.5 % (ref 0–6.9)
ERYTHROCYTE [DISTWIDTH] IN BLOOD BY AUTOMATED COUNT: 45.5 FL (ref 35.9–50)
ERYTHROCYTE [SEDIMENTATION RATE] IN BLOOD BY WESTERGREN METHOD: 5 MM/HOUR (ref 0–30)
GLUCOSE SERPL-MCNC: 95 MG/DL (ref 65–99)
HCT VFR BLD AUTO: 42.6 % (ref 37–47)
HDLC SERPL-MCNC: 102 MG/DL
HGB BLD-MCNC: 13.6 G/DL (ref 12–16)
IMM GRANULOCYTES # BLD AUTO: 0 K/UL (ref 0–0.11)
IMM GRANULOCYTES NFR BLD AUTO: 0 % (ref 0–0.9)
LDLC SERPL CALC-MCNC: 97 MG/DL
LYMPHOCYTES # BLD AUTO: 1.98 K/UL (ref 1–4.8)
LYMPHOCYTES NFR BLD: 47.1 % (ref 22–41)
MCH RBC QN AUTO: 29.6 PG (ref 27–33)
MCHC RBC AUTO-ENTMCNC: 31.9 G/DL (ref 33.6–35)
MCV RBC AUTO: 92.8 FL (ref 81.4–97.8)
MONOCYTES # BLD AUTO: 0.33 K/UL (ref 0–0.85)
MONOCYTES NFR BLD AUTO: 7.9 % (ref 0–13.4)
NEUTROPHILS # BLD AUTO: 1.86 K/UL (ref 2–7.15)
NEUTROPHILS NFR BLD: 44.3 % (ref 44–72)
NRBC # BLD AUTO: 0 K/UL
NRBC BLD-RTO: 0 /100 WBC
PLATELET # BLD AUTO: 263 K/UL (ref 164–446)
PMV BLD AUTO: 10.6 FL (ref 9–12.9)
POTASSIUM SERPL-SCNC: 4.1 MMOL/L (ref 3.6–5.5)
RBC # BLD AUTO: 4.59 M/UL (ref 4.2–5.4)
RHEUMATOID FACT SER IA-ACNC: 15 IU/ML (ref 0–14)
SODIUM SERPL-SCNC: 147 MMOL/L (ref 135–145)
T3 SERPL-MCNC: 89.3 NG/DL (ref 60–181)
T4 FREE SERPL-MCNC: 1.24 NG/DL (ref 0.93–1.7)
T4 SERPL-MCNC: 7.3 UG/DL (ref 4–12)
TRIGL SERPL-MCNC: 49 MG/DL (ref 0–149)
TSH SERPL DL<=0.005 MIU/L-ACNC: 3.72 UIU/ML (ref 0.38–5.33)
WBC # BLD AUTO: 4.2 K/UL (ref 4.8–10.8)

## 2021-03-02 PROCEDURE — 84439 ASSAY OF FREE THYROXINE: CPT

## 2021-03-02 PROCEDURE — 36415 COLL VENOUS BLD VENIPUNCTURE: CPT

## 2021-03-02 PROCEDURE — 86160 COMPLEMENT ANTIGEN: CPT | Mod: 91

## 2021-03-02 PROCEDURE — 86038 ANTINUCLEAR ANTIBODIES: CPT

## 2021-03-02 PROCEDURE — 86664 EPSTEIN-BARR NUCLEAR ANTIGEN: CPT

## 2021-03-02 PROCEDURE — 83018 HEAVY METAL QUAN EACH NES: CPT

## 2021-03-02 PROCEDURE — 84480 ASSAY TRIIODOTHYRONINE (T3): CPT

## 2021-03-02 PROCEDURE — 85652 RBC SED RATE AUTOMATED: CPT

## 2021-03-02 PROCEDURE — 86431 RHEUMATOID FACTOR QUANT: CPT

## 2021-03-02 PROCEDURE — 86645 CMV ANTIBODY IGM: CPT

## 2021-03-02 PROCEDURE — 80048 BASIC METABOLIC PNL TOTAL CA: CPT

## 2021-03-02 PROCEDURE — 86665 EPSTEIN-BARR CAPSID VCA: CPT | Mod: 91

## 2021-03-02 PROCEDURE — 86663 EPSTEIN-BARR ANTIBODY: CPT

## 2021-03-02 PROCEDURE — 85025 COMPLETE CBC W/AUTO DIFF WBC: CPT

## 2021-03-02 PROCEDURE — 80061 LIPID PANEL: CPT

## 2021-03-02 PROCEDURE — 86162 COMPLEMENT TOTAL (CH50): CPT

## 2021-03-02 PROCEDURE — 86644 CMV ANTIBODY: CPT

## 2021-03-02 PROCEDURE — 84443 ASSAY THYROID STIM HORMONE: CPT

## 2021-03-02 PROCEDURE — 82495 ASSAY OF CHROMIUM: CPT

## 2021-03-04 LAB — CH50 SERPL-ACNC: 76 U/ML (ref 38.7–89.9)

## 2021-03-05 ENCOUNTER — HOSPITAL ENCOUNTER (OUTPATIENT)
Dept: LAB | Facility: MEDICAL CENTER | Age: 71
End: 2021-03-05
Attending: ORTHOPAEDIC SURGERY
Payer: MEDICARE

## 2021-03-05 LAB
CMV IGG SERPL IA-ACNC: <0.2 U/ML
CMV IGM SERPL IA-ACNC: <8 AU/ML
CR SERPL-MCNC: 1.6 UG/L
EBV EA-D IGG SER-ACNC: <5 U/ML (ref 0–10.9)
EBV NA IGG SER IA-ACNC: 107 U/ML (ref 0–21.9)
EBV VCA IGG SER IA-ACNC: >750 U/ML (ref 0–21.9)
EBV VCA IGM SER IA-ACNC: <10 U/ML (ref 0–43.9)
NUCLEAR IGG SER QL IA: NORMAL

## 2021-03-05 PROCEDURE — 83018 HEAVY METAL QUAN EACH NES: CPT

## 2021-03-08 LAB — COBALT BLD-MCNC: <1 UG/L

## 2021-05-07 ENCOUNTER — PRE-ADMISSION TESTING (OUTPATIENT)
Dept: ADMISSIONS | Facility: MEDICAL CENTER | Age: 71
End: 2021-05-07
Attending: OTOLARYNGOLOGY
Payer: MEDICARE

## 2021-05-07 DIAGNOSIS — Z01.812 PRE-OPERATIVE LABORATORY EXAMINATION: ICD-10-CM

## 2021-05-07 DIAGNOSIS — Z01.810 PRE-OPERATIVE CARDIOVASCULAR EXAMINATION: ICD-10-CM

## 2021-05-07 LAB — EKG IMPRESSION: NORMAL

## 2021-05-07 PROCEDURE — 93010 ELECTROCARDIOGRAM REPORT: CPT | Performed by: INTERNAL MEDICINE

## 2021-05-07 PROCEDURE — U0005 INFEC AGEN DETEC AMPLI PROBE: HCPCS

## 2021-05-07 PROCEDURE — C9803 HOPD COVID-19 SPEC COLLECT: HCPCS

## 2021-05-07 PROCEDURE — U0003 INFECTIOUS AGENT DETECTION BY NUCLEIC ACID (DNA OR RNA); SEVERE ACUTE RESPIRATORY SYNDROME CORONAVIRUS 2 (SARS-COV-2) (CORONAVIRUS DISEASE [COVID-19]), AMPLIFIED PROBE TECHNIQUE, MAKING USE OF HIGH THROUGHPUT TECHNOLOGIES AS DESCRIBED BY CMS-2020-01-R: HCPCS

## 2021-05-07 PROCEDURE — 93005 ELECTROCARDIOGRAM TRACING: CPT

## 2021-05-07 RX ORDER — ESTRADIOL 0.1 MG/G
1 CREAM VAGINAL SEE ADMIN INSTRUCTIONS
COMMUNITY
Start: 2021-03-20

## 2021-05-07 RX ORDER — OSELTAMIVIR PHOSPHATE 75 MG/1
CAPSULE ORAL
COMMUNITY
Start: 2021-04-15 | End: 2022-10-14

## 2021-05-07 RX ORDER — LEVOTHYROXINE SODIUM 0.05 MG/1
50 TABLET ORAL
COMMUNITY
Start: 2021-04-28

## 2021-05-07 RX ORDER — DOXEPIN HYDROCHLORIDE 10 MG/1
10 CAPSULE ORAL
COMMUNITY
Start: 2021-02-14 | End: 2022-10-14

## 2021-05-07 RX ORDER — LIOTHYRONINE SODIUM 5 UG/1
2.5 TABLET ORAL DAILY
COMMUNITY

## 2021-05-07 ASSESSMENT — FIBROSIS 4 INDEX: FIB4 SCORE: 1.28

## 2021-05-08 LAB
SARS-COV-2 RNA RESP QL NAA+PROBE: NOTDETECTED
SPECIMEN SOURCE: NORMAL

## 2021-05-10 ENCOUNTER — ANESTHESIA (OUTPATIENT)
Dept: SURGERY | Facility: MEDICAL CENTER | Age: 71
End: 2021-05-10
Payer: MEDICARE

## 2021-05-10 ENCOUNTER — HOSPITAL ENCOUNTER (OUTPATIENT)
Facility: MEDICAL CENTER | Age: 71
End: 2021-05-10
Attending: OTOLARYNGOLOGY | Admitting: OTOLARYNGOLOGY
Payer: MEDICARE

## 2021-05-10 ENCOUNTER — ANESTHESIA EVENT (OUTPATIENT)
Dept: SURGERY | Facility: MEDICAL CENTER | Age: 71
End: 2021-05-10
Payer: MEDICARE

## 2021-05-10 VITALS
DIASTOLIC BLOOD PRESSURE: 81 MMHG | WEIGHT: 125.22 LBS | OXYGEN SATURATION: 93 % | TEMPERATURE: 97.4 F | BODY MASS INDEX: 20.12 KG/M2 | RESPIRATION RATE: 16 BRPM | SYSTOLIC BLOOD PRESSURE: 145 MMHG | HEART RATE: 65 BPM | HEIGHT: 66 IN

## 2021-05-10 PROCEDURE — 160025 RECOVERY II MINUTES (STATS): Performed by: OTOLARYNGOLOGY

## 2021-05-10 PROCEDURE — 160002 HCHG RECOVERY MINUTES (STAT): Performed by: OTOLARYNGOLOGY

## 2021-05-10 PROCEDURE — 700105 HCHG RX REV CODE 258: Performed by: OTOLARYNGOLOGY

## 2021-05-10 PROCEDURE — 700102 HCHG RX REV CODE 250 W/ 637 OVERRIDE(OP): Performed by: OTOLARYNGOLOGY

## 2021-05-10 PROCEDURE — 500331 HCHG COTTONOID, SURG PATTIE: Performed by: OTOLARYNGOLOGY

## 2021-05-10 PROCEDURE — 501601 HCHG TUBE, EAR ULTRASIL: Performed by: OTOLARYNGOLOGY

## 2021-05-10 PROCEDURE — 700101 HCHG RX REV CODE 250: Performed by: ANESTHESIOLOGY

## 2021-05-10 PROCEDURE — 160009 HCHG ANES TIME/MIN: Performed by: OTOLARYNGOLOGY

## 2021-05-10 PROCEDURE — 160048 HCHG OR STATISTICAL LEVEL 1-5: Performed by: OTOLARYNGOLOGY

## 2021-05-10 PROCEDURE — 160046 HCHG PACU - 1ST 60 MINS PHASE II: Performed by: OTOLARYNGOLOGY

## 2021-05-10 PROCEDURE — 700101 HCHG RX REV CODE 250: Performed by: OTOLARYNGOLOGY

## 2021-05-10 PROCEDURE — A9270 NON-COVERED ITEM OR SERVICE: HCPCS | Performed by: OTOLARYNGOLOGY

## 2021-05-10 PROCEDURE — 160035 HCHG PACU - 1ST 60 MINS PHASE I: Performed by: OTOLARYNGOLOGY

## 2021-05-10 PROCEDURE — 700111 HCHG RX REV CODE 636 W/ 250 OVERRIDE (IP): Performed by: ANESTHESIOLOGY

## 2021-05-10 PROCEDURE — C1726 CATH, BAL DIL, NON-VASCULAR: HCPCS | Performed by: OTOLARYNGOLOGY

## 2021-05-10 PROCEDURE — 160028 HCHG SURGERY MINUTES - 1ST 30 MINS LEVEL 3: Performed by: OTOLARYNGOLOGY

## 2021-05-10 PROCEDURE — 160039 HCHG SURGERY MINUTES - EA ADDL 1 MIN LEVEL 3: Performed by: OTOLARYNGOLOGY

## 2021-05-10 PROCEDURE — 700105 HCHG RX REV CODE 258: Performed by: ANESTHESIOLOGY

## 2021-05-10 RX ORDER — DEXAMETHASONE SODIUM PHOSPHATE 4 MG/ML
INJECTION, SOLUTION INTRA-ARTICULAR; INTRALESIONAL; INTRAMUSCULAR; INTRAVENOUS; SOFT TISSUE PRN
Status: DISCONTINUED | OUTPATIENT
Start: 2021-05-10 | End: 2021-05-10 | Stop reason: SURG

## 2021-05-10 RX ORDER — LIDOCAINE HYDROCHLORIDE 20 MG/ML
INJECTION, SOLUTION EPIDURAL; INFILTRATION; INTRACAUDAL; PERINEURAL PRN
Status: DISCONTINUED | OUTPATIENT
Start: 2021-05-10 | End: 2021-05-10 | Stop reason: SURG

## 2021-05-10 RX ORDER — KETOROLAC TROMETHAMINE 30 MG/ML
INJECTION, SOLUTION INTRAMUSCULAR; INTRAVENOUS PRN
Status: DISCONTINUED | OUTPATIENT
Start: 2021-05-10 | End: 2021-05-10 | Stop reason: SURG

## 2021-05-10 RX ORDER — HYDROMORPHONE HYDROCHLORIDE 1 MG/ML
0.1 INJECTION, SOLUTION INTRAMUSCULAR; INTRAVENOUS; SUBCUTANEOUS
Status: DISCONTINUED | OUTPATIENT
Start: 2021-05-10 | End: 2021-05-10 | Stop reason: HOSPADM

## 2021-05-10 RX ORDER — ONDANSETRON 2 MG/ML
INJECTION INTRAMUSCULAR; INTRAVENOUS PRN
Status: DISCONTINUED | OUTPATIENT
Start: 2021-05-10 | End: 2021-05-10 | Stop reason: SURG

## 2021-05-10 RX ORDER — HYDROMORPHONE HYDROCHLORIDE 1 MG/ML
0.5 INJECTION, SOLUTION INTRAMUSCULAR; INTRAVENOUS; SUBCUTANEOUS
Status: DISCONTINUED | OUTPATIENT
Start: 2021-05-10 | End: 2021-05-10 | Stop reason: HOSPADM

## 2021-05-10 RX ORDER — MIDAZOLAM HYDROCHLORIDE 1 MG/ML
1 INJECTION INTRAMUSCULAR; INTRAVENOUS
Status: DISCONTINUED | OUTPATIENT
Start: 2021-05-10 | End: 2021-05-10 | Stop reason: HOSPADM

## 2021-05-10 RX ORDER — OXYMETAZOLINE HYDROCHLORIDE 0.05 G/100ML
SPRAY NASAL
Status: DISCONTINUED
Start: 2021-05-10 | End: 2021-05-10 | Stop reason: HOSPADM

## 2021-05-10 RX ORDER — MEPERIDINE HYDROCHLORIDE 25 MG/ML
25 INJECTION INTRAMUSCULAR; INTRAVENOUS; SUBCUTANEOUS
Status: DISCONTINUED | OUTPATIENT
Start: 2021-05-10 | End: 2021-05-10 | Stop reason: HOSPADM

## 2021-05-10 RX ORDER — OXYMETAZOLINE HYDROCHLORIDE 0.05 G/100ML
2 SPRAY NASAL
Status: COMPLETED | OUTPATIENT
Start: 2021-05-10 | End: 2021-05-10

## 2021-05-10 RX ORDER — SODIUM CHLORIDE, SODIUM LACTATE, POTASSIUM CHLORIDE, CALCIUM CHLORIDE 600; 310; 30; 20 MG/100ML; MG/100ML; MG/100ML; MG/100ML
INJECTION, SOLUTION INTRAVENOUS CONTINUOUS
Status: DISCONTINUED | OUTPATIENT
Start: 2021-05-10 | End: 2021-05-10 | Stop reason: HOSPADM

## 2021-05-10 RX ORDER — HYDROMORPHONE HYDROCHLORIDE 1 MG/ML
0.2 INJECTION, SOLUTION INTRAMUSCULAR; INTRAVENOUS; SUBCUTANEOUS
Status: DISCONTINUED | OUTPATIENT
Start: 2021-05-10 | End: 2021-05-10 | Stop reason: HOSPADM

## 2021-05-10 RX ORDER — OXYCODONE HCL 5 MG/5 ML
5 SOLUTION, ORAL ORAL
Status: DISCONTINUED | OUTPATIENT
Start: 2021-05-10 | End: 2021-05-10 | Stop reason: HOSPADM

## 2021-05-10 RX ORDER — ONDANSETRON 2 MG/ML
4 INJECTION INTRAMUSCULAR; INTRAVENOUS
Status: DISCONTINUED | OUTPATIENT
Start: 2021-05-10 | End: 2021-05-10 | Stop reason: HOSPADM

## 2021-05-10 RX ORDER — OXYCODONE HCL 5 MG/5 ML
10 SOLUTION, ORAL ORAL
Status: DISCONTINUED | OUTPATIENT
Start: 2021-05-10 | End: 2021-05-10 | Stop reason: HOSPADM

## 2021-05-10 RX ORDER — DIPHENHYDRAMINE HYDROCHLORIDE 50 MG/ML
12.5 INJECTION INTRAMUSCULAR; INTRAVENOUS
Status: DISCONTINUED | OUTPATIENT
Start: 2021-05-10 | End: 2021-05-10 | Stop reason: HOSPADM

## 2021-05-10 RX ORDER — OXYMETAZOLINE HYDROCHLORIDE 0.05 G/100ML
SPRAY NASAL
Status: DISCONTINUED | OUTPATIENT
Start: 2021-05-10 | End: 2021-05-10 | Stop reason: HOSPADM

## 2021-05-10 RX ORDER — IPRATROPIUM BROMIDE AND ALBUTEROL SULFATE 2.5; .5 MG/3ML; MG/3ML
3 SOLUTION RESPIRATORY (INHALATION)
Status: DISCONTINUED | OUTPATIENT
Start: 2021-05-10 | End: 2021-05-10 | Stop reason: HOSPADM

## 2021-05-10 RX ORDER — CIPROFLOXACIN AND DEXAMETHASONE 3; 1 MG/ML; MG/ML
SUSPENSION/ DROPS AURICULAR (OTIC)
Status: DISCONTINUED
Start: 2021-05-10 | End: 2021-05-10 | Stop reason: HOSPADM

## 2021-05-10 RX ADMIN — LIDOCAINE HYDROCHLORIDE 40 MG: 20 INJECTION, SOLUTION EPIDURAL; INFILTRATION; INTRACAUDAL at 08:52

## 2021-05-10 RX ADMIN — POVIDONE IODINE 15 ML: 100 SOLUTION TOPICAL at 07:44

## 2021-05-10 RX ADMIN — PROPOFOL 100 MG: 10 INJECTION, EMULSION INTRAVENOUS at 08:52

## 2021-05-10 RX ADMIN — ONDANSETRON 4 MG: 2 INJECTION INTRAMUSCULAR; INTRAVENOUS at 08:58

## 2021-05-10 RX ADMIN — OXYMETAZOLINE HCL 2 SPRAY: 0.05 SPRAY NASAL at 08:27

## 2021-05-10 RX ADMIN — SODIUM CHLORIDE, POTASSIUM CHLORIDE, SODIUM LACTATE AND CALCIUM CHLORIDE: 600; 310; 30; 20 INJECTION, SOLUTION INTRAVENOUS at 07:56

## 2021-05-10 RX ADMIN — ALFENTANIL HYDROCHLORIDE 500 MCG: 500 INJECTION INTRAVENOUS at 08:52

## 2021-05-10 RX ADMIN — DEXAMETHASONE SODIUM PHOSPHATE 8 MG: 4 INJECTION, SOLUTION INTRA-ARTICULAR; INTRALESIONAL; INTRAMUSCULAR; INTRAVENOUS; SOFT TISSUE at 08:58

## 2021-05-10 RX ADMIN — MIDAZOLAM 2 MG: 1 INJECTION INTRAMUSCULAR; INTRAVENOUS at 08:48

## 2021-05-10 RX ADMIN — KETOROLAC TROMETHAMINE 15 MG: 30 INJECTION, SOLUTION INTRAMUSCULAR at 08:58

## 2021-05-10 RX ADMIN — SODIUM CHLORIDE, POTASSIUM CHLORIDE, SODIUM LACTATE AND CALCIUM CHLORIDE: 600; 310; 30; 20 INJECTION, SOLUTION INTRAVENOUS at 09:45

## 2021-05-10 ASSESSMENT — PAIN SCALES - GENERAL: PAIN_LEVEL: 2

## 2021-05-10 ASSESSMENT — FIBROSIS 4 INDEX: FIB4 SCORE: 1.28

## 2021-05-10 NOTE — OP REPORT
DATE OF SERVICE:  05/10/2021     PREOPERATIVE DIAGNOSIS:  Eustachian tube dysfunction.     POSTOPERATIVE DIAGNOSIS:  Eustachian tube dysfunction.     PROCEDURE PERFORMED:  Bilateral eustachian tube dilation.     OPERATIVE FINDINGS:  Bilateral eustachian tubes were dilated nicely today, two   minutes apiece.     DESCRIPTION OF PROCEDURE:  The patient was intubated by Anesthesia with LMA   anesthesia.  Once adequate levels of anesthesia were achieved Afrin-soaked   pledgets were placed in bilateral nasal cavities.  First, the left nasal   cavity was addressed.  A 0-degree scope was inserted into the left naris and   brought it into the nasopharynx.  The balloon dilation Entellus instrument was   then inserted into the eustachian tube orifice at the appropriate depth and   then dilated to a 12 atmospheres for 2 minutes.  The instrument was then   removed and using a 70-degree scope through the left naris due to some   deviated septum towards the right.  The instrument was then inserted into the   right naris and in similar fashion, the right eustachian tube dilation was   performed.  At this point, my portion of the procedure was terminated.  Nasal   cavity was suctioned of any bloody contents and the patient was turned back   over to anesthesia for emergence.     COMPLICATIONS:  None.     SPONGE COUNT:  Correct.     IV FLUIDS GIVEN:  Per anesthesia.     ESTIMATED BLOOD LOSS:  5-10 mL.     ANESTHESIA TYPE:  General.        ______________________________  MD TERESA Panrell/YARIEL    DD:  05/10/2021 09:21  DT:  05/10/2021 09:40    Job#:  [id number]

## 2021-05-10 NOTE — ANESTHESIA POSTPROCEDURE EVALUATION
Patient: Miranda Rose    Procedure Summary     Date: 05/10/21 Room / Location: [facility] ROOM 28 / SURGERY SAME DAY [facility]    Anesthesia Start: 0848 Anesthesia Stop: 0926    Procedure: MYRINGOTOMY, BILATERAL, WITH INSERTION OF TYMPANOSTOMY TUBE IF INDICATED - EUSTACHIAN TUBE WITH BALLOON DILATION. (Bilateral Ear) Diagnosis: (EUSTACHIAN TUBE DYSFUNCTION)    Surgeons: Peterson Willard M.D. Responsible Provider: Matthew Arias M.D.    Anesthesia Type: general ASA Status: 2          Final Anesthesia Type: general  Last vitals  BP   Blood Pressure : 145/81    Temp   36.3 °C (97.4 °F)    Pulse   68   Resp   16    SpO2   93 %      Anesthesia Post Evaluation    Patient location during evaluation: PACU  Patient participation: complete - patient participated  Level of consciousness: awake and alert  Pain score: 2    Airway patency: patent  Anesthetic complications: no  Cardiovascular status: hemodynamically stable  Respiratory status: acceptable  Hydration status: euvolemic    PONV: none          No complications documented.     Nurse Pain Score: 0 (NPRS)

## 2021-05-10 NOTE — OR NURSING
0924- Pt arrived from OR. Report received. VSS. Pt oxygenating well on 6L mask. Pt resting comfortably and denying pain and nausea at this time.     0953-Pt resting comfortably at this time. Pt denying pain and nausea.     1017-Discharge instructions discussed with pts , Tyree. All questions answered. Pt meets phase two criteria.    1031-Pt changing into clothes from home.     1042-IV removed. Pt discharged home to . All personal belongings with pt.

## 2021-05-10 NOTE — ANESTHESIA TIME REPORT
Anesthesia Start and Stop Event Times     Date Time Event    5/10/2021 0806 Ready for Procedure     0848 Anesthesia Start     0926 Anesthesia Stop        Responsible Staff  05/10/21    Name Role Begin End    Matthew Arias M.D. Anesth 0848 0926        Preop Diagnosis (Free Text):  Pre-op Diagnosis     EUSTACHIAN TUBE DYSFUNCTION        Preop Diagnosis (Codes):    Post op Diagnosis  Eustachian tube dysfunction      Premium Reason  Non-Premium    Comments:

## 2021-05-10 NOTE — DISCHARGE INSTRUCTIONS
ACTIVITY: Rest and take it easy for the first 24 hours.  A responsible adult is recommended to remain with you during that time.  It is normal to feel sleepy.  We encourage you to not do anything that requires balance, judgment or coordination.    MILD FLU-LIKE SYMPTOMS ARE NORMAL. YOU MAY EXPERIENCE GENERALIZED MUSCLE ACHES, THROAT IRRITATION, HEADACHE AND/OR SOME NAUSEA.    FOR 24 HOURS DO NOT:  Drive, operate machinery or run household appliances.  Drink beer or alcoholic beverages.   Make important decisions or sign legal documents.    SPECIAL INSTRUCTIONS:See attached sheet.    DIET: To avoid nausea, slowly advance diet as tolerated, avoiding spicy or greasy foods for the first day.  Add more substantial food to your diet according to your physician's instructions.  Babies can be fed formula or breast milk as soon as they are hungry.  INCREASE FLUIDS AND FIBER TO AVOID CONSTIPATION.    SURGICAL DRESSING/BATHING: Keep ears dry when bathing. No swimming.    FOLLOW-UP APPOINTMENT:  A follow-up appointment should be arranged with your doctor; call to schedule.    You should CALL YOUR PHYSICIAN if you develop:  Fever greater than 101 degrees F.  Pain not relieved by medication, or persistent nausea or vomiting.  Excessive bleeding (blood soaking through dressing) or unexpected drainage from the wound.  Extreme redness or swelling around the incision site, drainage of pus or foul smelling drainage.  Inability to urinate or empty your bladder within 8 hours.  Problems with breathing or chest pain.    You should call 911 if you develop problems with breathing or chest pain  If you are unable to contact your doctor or surgical center, you should go to the nearest emergency room or urgent care center.  Physician's telephone #: Dr. Willard [phone]    If any questions arise, call your doctor.  If your doctor is not available, please feel free to call the Surgical Center at [phone]. The Contact Center is open  Monday through Friday 7AM to 5PM and may speak to a nurse at [phone], or toll free at [phone].     A registered nurse may call you a few days after your surgery to see how you are doing after your procedure.    MEDICATIONS: Resume taking daily medication.  Take prescribed pain medication with food.  If no medication is prescribed, you may take non-aspirin pain medication if needed.  PAIN MEDICATION CAN BE VERY CONSTIPATING.  Take a stool softener or laxative such as senokot, pericolace, or milk of magnesia if needed.      If your physician has prescribed pain medication that includes Acetaminophen (Tylenol), do not take additional Acetaminophen (Tylenol) while taking the prescribed medication.    Depression / Suicide Risk    As you are discharged from this [facility] facility, it is important to learn how to keep safe from harming yourself.    Recognize the warning signs:  · Abrupt changes in personality, positive or negative- including increase in energy   · Giving away possessions  · Change in eating patterns- significant weight changes-  positive or negative  · Change in sleeping patterns- unable to sleep or sleeping all the time   · Unwillingness or inability to communicate  · Depression  · Unusual sadness, discouragement and loneliness  · Talk of wanting to die  · Neglect of personal appearance   · Rebelliousness- reckless behavior  · Withdrawal from people/activities they love  · Confusion- inability to concentrate     If you or a loved one observes any of these behaviors or has concerns about self-harm, here's what you can do:  · Talk about it- your feelings and reasons for harming yourself  · Remove any means that you might use to hurt yourself (examples: pills, rope, extension cords, firearm)  · Get professional help from the community (Mental Health, Substance Abuse, psychological counseling)  · Do not be alone:Call your Safe Contact- someone whom you trust who will be there for  you.  · Call your local CRISIS HOTLINE 538-4934 or 975-059-9048  · Call your local Children's Mobile Crisis Response Team [place] (255) 502-9928 or www.Seevibes  · Call the toll free National Suicide Prevention Hotlines   · National Suicide Prevention Lifeline 211-035-NAYS (6123)  · National [place] Line Network 800-SUICIDE (223-4341)

## 2021-05-10 NOTE — ANESTHESIA PROCEDURE NOTES
Airway  Performed by: Matthew Arias M.D.  Authorized by: Matthew Arias M.D.     Location:  OR  Urgency:  Elective  Indications for Airway Management:  Anesthesia      Spontaneous Ventilation: absent    Sedation Level:  Deep  Preoxygenated: Yes    Final Airway Type:  Supraglottic airway  Final Supraglottic Airway:  Standard LMA    SGA Size:  3  Number of Attempts at Approach:  1

## 2021-05-10 NOTE — ANESTHESIA PREPROCEDURE EVALUATION
Relevant Problems   ENDO   (+) Hypothyroid       Physical Exam    Airway   Mallampati: II  TM distance: >3 FB  Neck ROM: full       Cardiovascular - normal exam  Rhythm: regular  Rate: normal  (-) murmur     Dental - normal exam           Pulmonary - normal exam  Breath sounds clear to auscultation     Abdominal    Neurological - normal exam                 Anesthesia Plan    ASA 2       Plan - general       Airway plan will be ETT          Induction: intravenous    Postoperative Plan: Postoperative administration of opioids is intended.    Pertinent diagnostic labs and testing reviewed    Informed Consent:    Anesthetic plan and risks discussed with patient.    Use of blood products discussed with: patient whom consented to blood products.

## 2021-06-01 ENCOUNTER — HOSPITAL ENCOUNTER (OUTPATIENT)
Dept: LAB | Facility: MEDICAL CENTER | Age: 71
End: 2021-06-01
Attending: EMERGENCY MEDICINE
Payer: MEDICARE

## 2021-06-01 LAB
ANION GAP SERPL CALC-SCNC: 11 MMOL/L (ref 7–16)
BUN SERPL-MCNC: 18 MG/DL (ref 8–22)
CALCIUM SERPL-MCNC: 8.9 MG/DL (ref 8.5–10.5)
CHLORIDE SERPL-SCNC: 104 MMOL/L (ref 96–112)
CO2 SERPL-SCNC: 26 MMOL/L (ref 20–33)
CREAT SERPL-MCNC: 0.68 MG/DL (ref 0.5–1.4)
GLUCOSE SERPL-MCNC: 96 MG/DL (ref 65–99)
POTASSIUM SERPL-SCNC: 4.2 MMOL/L (ref 3.6–5.5)
SODIUM SERPL-SCNC: 141 MMOL/L (ref 135–145)

## 2021-06-01 PROCEDURE — 36415 COLL VENOUS BLD VENIPUNCTURE: CPT

## 2021-06-01 PROCEDURE — 80048 BASIC METABOLIC PNL TOTAL CA: CPT

## 2021-06-05 ENCOUNTER — HOSPITAL ENCOUNTER (OUTPATIENT)
Dept: RADIOLOGY | Facility: MEDICAL CENTER | Age: 71
End: 2021-06-05
Attending: OTOLARYNGOLOGY
Payer: MEDICARE

## 2021-06-05 DIAGNOSIS — R22.1 NECK MASS: ICD-10-CM

## 2021-06-05 PROCEDURE — 700117 HCHG RX CONTRAST REV CODE 255: Performed by: OTOLARYNGOLOGY

## 2021-06-05 PROCEDURE — 70491 CT SOFT TISSUE NECK W/DYE: CPT | Mod: MH

## 2021-06-05 RX ADMIN — IOHEXOL 80 ML: 350 INJECTION, SOLUTION INTRAVENOUS at 16:57

## 2021-10-22 ENCOUNTER — APPOINTMENT (OUTPATIENT)
Dept: NEUROLOGY | Facility: MEDICAL CENTER | Age: 71
End: 2021-10-22
Payer: MEDICARE

## 2021-11-03 ENCOUNTER — HOSPITAL ENCOUNTER (OUTPATIENT)
Dept: RADIOLOGY | Facility: MEDICAL CENTER | Age: 71
End: 2021-11-03
Attending: EMERGENCY MEDICINE
Payer: MEDICARE

## 2021-11-03 DIAGNOSIS — M19.90 ARTHRITIS: ICD-10-CM

## 2021-11-03 PROCEDURE — 77077 JOINT SURVEY SINGLE VIEW: CPT

## 2022-01-05 ENCOUNTER — HOSPITAL ENCOUNTER (OUTPATIENT)
Dept: LAB | Facility: MEDICAL CENTER | Age: 72
End: 2022-01-05
Attending: EMERGENCY MEDICINE
Payer: MEDICARE

## 2022-01-05 LAB
BASOPHILS # BLD AUTO: 0.3 % (ref 0–1.8)
BASOPHILS # BLD: 0.02 K/UL (ref 0–0.12)
CRP SERPL HS-MCNC: <0.3 MG/DL (ref 0–0.75)
EOSINOPHIL # BLD AUTO: 0.02 K/UL (ref 0–0.51)
EOSINOPHIL NFR BLD: 0.3 % (ref 0–6.9)
ERYTHROCYTE [DISTWIDTH] IN BLOOD BY AUTOMATED COUNT: 46.7 FL (ref 35.9–50)
HCT VFR BLD AUTO: 41.6 % (ref 37–47)
HGB BLD-MCNC: 13.3 G/DL (ref 12–16)
IMM GRANULOCYTES # BLD AUTO: 0.02 K/UL (ref 0–0.11)
IMM GRANULOCYTES NFR BLD AUTO: 0.3 % (ref 0–0.9)
LYMPHOCYTES # BLD AUTO: 1.87 K/UL (ref 1–4.8)
LYMPHOCYTES NFR BLD: 30.7 % (ref 22–41)
MCH RBC QN AUTO: 30.1 PG (ref 27–33)
MCHC RBC AUTO-ENTMCNC: 32 G/DL (ref 33.6–35)
MCV RBC AUTO: 94.1 FL (ref 81.4–97.8)
MONOCYTES # BLD AUTO: 0.51 K/UL (ref 0–0.85)
MONOCYTES NFR BLD AUTO: 8.4 % (ref 0–13.4)
NEUTROPHILS # BLD AUTO: 3.66 K/UL (ref 2–7.15)
NEUTROPHILS NFR BLD: 60 % (ref 44–72)
NRBC # BLD AUTO: 0 K/UL
NRBC BLD-RTO: 0 /100 WBC
PLATELET # BLD AUTO: 240 K/UL (ref 164–446)
PMV BLD AUTO: 10.4 FL (ref 9–12.9)
RBC # BLD AUTO: 4.42 M/UL (ref 4.2–5.4)
WBC # BLD AUTO: 6.1 K/UL (ref 4.8–10.8)

## 2022-01-05 PROCEDURE — 85025 COMPLETE CBC W/AUTO DIFF WBC: CPT

## 2022-01-05 PROCEDURE — 86140 C-REACTIVE PROTEIN: CPT

## 2022-01-05 PROCEDURE — 86664 EPSTEIN-BARR NUCLEAR ANTIGEN: CPT

## 2022-01-05 PROCEDURE — 36415 COLL VENOUS BLD VENIPUNCTURE: CPT

## 2022-01-05 PROCEDURE — 86663 EPSTEIN-BARR ANTIBODY: CPT

## 2022-01-05 PROCEDURE — 86665 EPSTEIN-BARR CAPSID VCA: CPT

## 2022-01-08 LAB
EBV EA-D IGG SER-ACNC: <5 U/ML (ref 0–10.9)
EBV NA IGG SER IA-ACNC: 85 U/ML (ref 0–21.9)
EBV VCA IGG SER IA-ACNC: 662 U/ML (ref 0–21.9)
EBV VCA IGM SER IA-ACNC: 25 U/ML (ref 0–43.9)

## 2022-06-17 ENCOUNTER — HOSPITAL ENCOUNTER (OUTPATIENT)
Dept: LAB | Facility: MEDICAL CENTER | Age: 72
End: 2022-06-17
Attending: INTERNAL MEDICINE
Payer: MEDICARE

## 2022-06-17 LAB
T3FREE SERPL-MCNC: 2.81 PG/ML (ref 2–4.4)
T4 FREE SERPL-MCNC: 1.53 NG/DL (ref 0.93–1.7)
TSH SERPL DL<=0.005 MIU/L-ACNC: 1.68 UIU/ML (ref 0.38–5.33)

## 2022-06-17 PROCEDURE — 36415 COLL VENOUS BLD VENIPUNCTURE: CPT

## 2022-06-17 PROCEDURE — 84439 ASSAY OF FREE THYROXINE: CPT

## 2022-06-17 PROCEDURE — 84481 FREE ASSAY (FT-3): CPT

## 2022-06-17 PROCEDURE — 84443 ASSAY THYROID STIM HORMONE: CPT

## 2022-07-01 ENCOUNTER — APPOINTMENT (OUTPATIENT)
Dept: RADIOLOGY | Facility: MEDICAL CENTER | Age: 72
End: 2022-07-01
Attending: EMERGENCY MEDICINE
Payer: MEDICARE

## 2022-07-15 ENCOUNTER — HOSPITAL ENCOUNTER (OUTPATIENT)
Dept: RADIOLOGY | Facility: MEDICAL CENTER | Age: 72
End: 2022-07-15
Attending: EMERGENCY MEDICINE
Payer: MEDICARE

## 2022-07-15 DIAGNOSIS — M85.80 OSTEOPENIA, UNSPECIFIED LOCATION: ICD-10-CM

## 2022-07-15 DIAGNOSIS — M19.91 PRIMARY LOCALIZED OSTEOARTHROSIS: ICD-10-CM

## 2022-07-15 DIAGNOSIS — M81.0 OSTEOPOROSIS, UNSPECIFIED OSTEOPOROSIS TYPE, UNSPECIFIED PATHOLOGICAL FRACTURE PRESENCE: ICD-10-CM

## 2022-07-15 PROCEDURE — 77080 DXA BONE DENSITY AXIAL: CPT

## 2022-09-14 ENCOUNTER — HOSPITAL ENCOUNTER (OUTPATIENT)
Dept: LAB | Facility: MEDICAL CENTER | Age: 72
End: 2022-09-14
Attending: INTERNAL MEDICINE
Payer: MEDICARE

## 2022-09-14 LAB
ALBUMIN SERPL BCP-MCNC: 4.7 G/DL (ref 3.2–4.9)
ALBUMIN/GLOB SERPL: 2 G/DL
ALP SERPL-CCNC: 68 U/L (ref 30–99)
ALT SERPL-CCNC: 18 U/L (ref 2–50)
ANION GAP SERPL CALC-SCNC: 11 MMOL/L (ref 7–16)
APPEARANCE UR: CLEAR
AST SERPL-CCNC: 22 U/L (ref 12–45)
BASOPHILS # BLD AUTO: 0.4 % (ref 0–1.8)
BASOPHILS # BLD: 0.02 K/UL (ref 0–0.12)
BILIRUB SERPL-MCNC: 0.2 MG/DL (ref 0.1–1.5)
BILIRUB UR QL STRIP.AUTO: NEGATIVE
BUN SERPL-MCNC: 15 MG/DL (ref 8–22)
CALCIUM SERPL-MCNC: 9.3 MG/DL (ref 8.5–10.5)
CHLORIDE SERPL-SCNC: 107 MMOL/L (ref 96–112)
CO2 SERPL-SCNC: 26 MMOL/L (ref 20–33)
COLOR UR: YELLOW
CREAT SERPL-MCNC: 0.9 MG/DL (ref 0.5–1.4)
CRP SERPL HS-MCNC: <0.3 MG/DL (ref 0–0.75)
EOSINOPHIL # BLD AUTO: 0.03 K/UL (ref 0–0.51)
EOSINOPHIL NFR BLD: 0.6 % (ref 0–6.9)
ERYTHROCYTE [DISTWIDTH] IN BLOOD BY AUTOMATED COUNT: 50.8 FL (ref 35.9–50)
ERYTHROCYTE [SEDIMENTATION RATE] IN BLOOD BY WESTERGREN METHOD: 14 MM/HOUR (ref 0–25)
GFR SERPLBLD CREATININE-BSD FMLA CKD-EPI: 68 ML/MIN/1.73 M 2
GLOBULIN SER CALC-MCNC: 2.3 G/DL (ref 1.9–3.5)
GLUCOSE SERPL-MCNC: 107 MG/DL (ref 65–99)
GLUCOSE UR STRIP.AUTO-MCNC: NEGATIVE MG/DL
HCT VFR BLD AUTO: 40 % (ref 37–47)
HGB BLD-MCNC: 13 G/DL (ref 12–16)
IMM GRANULOCYTES # BLD AUTO: 0.01 K/UL (ref 0–0.11)
IMM GRANULOCYTES NFR BLD AUTO: 0.2 % (ref 0–0.9)
KETONES UR STRIP.AUTO-MCNC: NEGATIVE MG/DL
LEUKOCYTE ESTERASE UR QL STRIP.AUTO: NEGATIVE
LYMPHOCYTES # BLD AUTO: 1.3 K/UL (ref 1–4.8)
LYMPHOCYTES NFR BLD: 24.2 % (ref 22–41)
MCH RBC QN AUTO: 30.3 PG (ref 27–33)
MCHC RBC AUTO-ENTMCNC: 32.5 G/DL (ref 33.6–35)
MCV RBC AUTO: 93.2 FL (ref 81.4–97.8)
MICRO URNS: NORMAL
MONOCYTES # BLD AUTO: 0.29 K/UL (ref 0–0.85)
MONOCYTES NFR BLD AUTO: 5.4 % (ref 0–13.4)
NEUTROPHILS # BLD AUTO: 3.73 K/UL (ref 2–7.15)
NEUTROPHILS NFR BLD: 69.2 % (ref 44–72)
NITRITE UR QL STRIP.AUTO: NEGATIVE
NRBC # BLD AUTO: 0 K/UL
NRBC BLD-RTO: 0 /100 WBC
PH UR STRIP.AUTO: 6.5 [PH] (ref 5–8)
PLATELET # BLD AUTO: 289 K/UL (ref 164–446)
PMV BLD AUTO: 10.6 FL (ref 9–12.9)
POTASSIUM SERPL-SCNC: 4.6 MMOL/L (ref 3.6–5.5)
PROT SERPL-MCNC: 7 G/DL (ref 6–8.2)
PROT UR QL STRIP: NEGATIVE MG/DL
RBC # BLD AUTO: 4.29 M/UL (ref 4.2–5.4)
RBC UR QL AUTO: NEGATIVE
SODIUM SERPL-SCNC: 144 MMOL/L (ref 135–145)
SP GR UR STRIP.AUTO: 1.01
UROBILINOGEN UR STRIP.AUTO-MCNC: 0.2 MG/DL
WBC # BLD AUTO: 5.4 K/UL (ref 4.8–10.8)

## 2022-09-14 PROCEDURE — 36415 COLL VENOUS BLD VENIPUNCTURE: CPT

## 2022-09-14 PROCEDURE — 87086 URINE CULTURE/COLONY COUNT: CPT

## 2022-09-14 PROCEDURE — 82784 ASSAY IGA/IGD/IGG/IGM EACH: CPT

## 2022-09-14 PROCEDURE — 86140 C-REACTIVE PROTEIN: CPT

## 2022-09-14 PROCEDURE — 80053 COMPREHEN METABOLIC PANEL: CPT

## 2022-09-14 PROCEDURE — 85652 RBC SED RATE AUTOMATED: CPT

## 2022-09-14 PROCEDURE — 81003 URINALYSIS AUTO W/O SCOPE: CPT

## 2022-09-14 PROCEDURE — 85025 COMPLETE CBC W/AUTO DIFF WBC: CPT

## 2022-09-16 LAB
BACTERIA UR CULT: NORMAL
IGA SERPL-MCNC: 101 MG/DL (ref 68–408)
IGG SERPL-MCNC: 758 MG/DL (ref 768–1632)
IGM SERPL-MCNC: 102 MG/DL (ref 35–263)
SIGNIFICANT IND 70042: NORMAL
SITE SITE: NORMAL
SOURCE SOURCE: NORMAL

## 2022-10-14 ENCOUNTER — APPOINTMENT (OUTPATIENT)
Dept: RADIOLOGY | Facility: MEDICAL CENTER | Age: 72
DRG: 438 | End: 2022-10-14
Attending: EMERGENCY MEDICINE
Payer: MEDICARE

## 2022-10-14 ENCOUNTER — HOSPITAL ENCOUNTER (INPATIENT)
Facility: MEDICAL CENTER | Age: 72
LOS: 7 days | DRG: 438 | End: 2022-10-21
Attending: EMERGENCY MEDICINE | Admitting: INTERNAL MEDICINE
Payer: MEDICARE

## 2022-10-14 DIAGNOSIS — K85.90 ACUTE PANCREATITIS, UNSPECIFIED COMPLICATION STATUS, UNSPECIFIED PANCREATITIS TYPE: ICD-10-CM

## 2022-10-14 DIAGNOSIS — I82.A12 ACUTE DEEP VEIN THROMBOSIS (DVT) OF AXILLARY VEIN OF LEFT UPPER EXTREMITY (HCC): ICD-10-CM

## 2022-10-14 DIAGNOSIS — J96.01 ACUTE RESPIRATORY FAILURE WITH HYPOXEMIA (HCC): ICD-10-CM

## 2022-10-14 DIAGNOSIS — K85.90 SEVERE ACUTE PANCREATITIS: ICD-10-CM

## 2022-10-14 PROBLEM — M34.9 SCLERODERMA (HCC): Status: ACTIVE | Noted: 2022-10-14

## 2022-10-14 LAB
ALBUMIN SERPL BCP-MCNC: 4.5 G/DL (ref 3.2–4.9)
ALBUMIN/GLOB SERPL: 1.6 G/DL
ALP SERPL-CCNC: 75 U/L (ref 30–99)
ALT SERPL-CCNC: 17 U/L (ref 2–50)
ANION GAP SERPL CALC-SCNC: 15 MMOL/L (ref 7–16)
APPEARANCE UR: CLEAR
AST SERPL-CCNC: 19 U/L (ref 12–45)
BASOPHILS # BLD AUTO: 0.1 % (ref 0–1.8)
BASOPHILS # BLD: 0.01 K/UL (ref 0–0.12)
BILIRUB SERPL-MCNC: 0.3 MG/DL (ref 0.1–1.5)
BILIRUB UR QL STRIP.AUTO: NEGATIVE
BUN SERPL-MCNC: 15 MG/DL (ref 8–22)
CALCIUM SERPL-MCNC: 9.5 MG/DL (ref 8.4–10.2)
CHLORIDE SERPL-SCNC: 101 MMOL/L (ref 96–112)
CHOLEST SERPL-MCNC: 214 MG/DL (ref 100–199)
CO2 SERPL-SCNC: 21 MMOL/L (ref 20–33)
COLOR UR: YELLOW
CREAT SERPL-MCNC: 0.7 MG/DL (ref 0.5–1.4)
EOSINOPHIL # BLD AUTO: 0.01 K/UL (ref 0–0.51)
EOSINOPHIL NFR BLD: 0.1 % (ref 0–6.9)
ERYTHROCYTE [DISTWIDTH] IN BLOOD BY AUTOMATED COUNT: 48.9 FL (ref 35.9–50)
GFR SERPLBLD CREATININE-BSD FMLA CKD-EPI: 92 ML/MIN/1.73 M 2
GLOBULIN SER CALC-MCNC: 2.8 G/DL (ref 1.9–3.5)
GLUCOSE SERPL-MCNC: 141 MG/DL (ref 65–99)
GLUCOSE UR STRIP.AUTO-MCNC: NEGATIVE MG/DL
HCT VFR BLD AUTO: 41.3 % (ref 37–47)
HDLC SERPL-MCNC: 98 MG/DL
HGB BLD-MCNC: 13.7 G/DL (ref 12–16)
IMM GRANULOCYTES # BLD AUTO: 0.06 K/UL (ref 0–0.11)
IMM GRANULOCYTES NFR BLD AUTO: 0.6 % (ref 0–0.9)
KETONES UR STRIP.AUTO-MCNC: ABNORMAL MG/DL
LDLC SERPL CALC-MCNC: 106 MG/DL
LEUKOCYTE ESTERASE UR QL STRIP.AUTO: NEGATIVE
LIPASE SERPL-CCNC: >3000 U/L (ref 7–58)
LYMPHOCYTES # BLD AUTO: 1.35 K/UL (ref 1–4.8)
LYMPHOCYTES NFR BLD: 14.5 % (ref 22–41)
MCH RBC QN AUTO: 30.4 PG (ref 27–33)
MCHC RBC AUTO-ENTMCNC: 33.2 G/DL (ref 33.6–35)
MCV RBC AUTO: 91.6 FL (ref 81.4–97.8)
MICRO URNS: ABNORMAL
MONOCYTES # BLD AUTO: 0.6 K/UL (ref 0–0.85)
MONOCYTES NFR BLD AUTO: 6.4 % (ref 0–13.4)
NEUTROPHILS # BLD AUTO: 7.31 K/UL (ref 2–7.15)
NEUTROPHILS NFR BLD: 78.3 % (ref 44–72)
NITRITE UR QL STRIP.AUTO: NEGATIVE
NRBC # BLD AUTO: 0 K/UL
NRBC BLD-RTO: 0 /100 WBC
PH UR STRIP.AUTO: 5.5 [PH] (ref 5–8)
PLATELET # BLD AUTO: 333 K/UL (ref 164–446)
PMV BLD AUTO: 9.7 FL (ref 9–12.9)
POTASSIUM SERPL-SCNC: 4.1 MMOL/L (ref 3.6–5.5)
PROT SERPL-MCNC: 7.3 G/DL (ref 6–8.2)
PROT UR QL STRIP: NEGATIVE MG/DL
RBC # BLD AUTO: 4.51 M/UL (ref 4.2–5.4)
RBC UR QL AUTO: NEGATIVE
SODIUM SERPL-SCNC: 137 MMOL/L (ref 135–145)
SP GR UR STRIP.AUTO: 1.01
TRIGL SERPL-MCNC: 52 MG/DL (ref 0–149)
WBC # BLD AUTO: 9.3 K/UL (ref 4.8–10.8)

## 2022-10-14 PROCEDURE — 700117 HCHG RX CONTRAST REV CODE 255: Performed by: EMERGENCY MEDICINE

## 2022-10-14 PROCEDURE — 700105 HCHG RX REV CODE 258: Performed by: EMERGENCY MEDICINE

## 2022-10-14 PROCEDURE — 85025 COMPLETE CBC W/AUTO DIFF WBC: CPT

## 2022-10-14 PROCEDURE — A9270 NON-COVERED ITEM OR SERVICE: HCPCS | Performed by: EMERGENCY MEDICINE

## 2022-10-14 PROCEDURE — 99223 1ST HOSP IP/OBS HIGH 75: CPT | Mod: AI | Performed by: INTERNAL MEDICINE

## 2022-10-14 PROCEDURE — 96376 TX/PRO/DX INJ SAME DRUG ADON: CPT

## 2022-10-14 PROCEDURE — 74177 CT ABD & PELVIS W/CONTRAST: CPT

## 2022-10-14 PROCEDURE — 99285 EMERGENCY DEPT VISIT HI MDM: CPT

## 2022-10-14 PROCEDURE — 82787 IGG 1 2 3 OR 4 EACH: CPT | Mod: 91

## 2022-10-14 PROCEDURE — 770006 HCHG ROOM/CARE - MED/SURG/GYN SEMI*

## 2022-10-14 PROCEDURE — A9270 NON-COVERED ITEM OR SERVICE: HCPCS | Performed by: INTERNAL MEDICINE

## 2022-10-14 PROCEDURE — 700111 HCHG RX REV CODE 636 W/ 250 OVERRIDE (IP): Performed by: INTERNAL MEDICINE

## 2022-10-14 PROCEDURE — 96375 TX/PRO/DX INJ NEW DRUG ADDON: CPT

## 2022-10-14 PROCEDURE — 36415 COLL VENOUS BLD VENIPUNCTURE: CPT

## 2022-10-14 PROCEDURE — 700111 HCHG RX REV CODE 636 W/ 250 OVERRIDE (IP): Performed by: EMERGENCY MEDICINE

## 2022-10-14 PROCEDURE — 700102 HCHG RX REV CODE 250 W/ 637 OVERRIDE(OP): Performed by: EMERGENCY MEDICINE

## 2022-10-14 PROCEDURE — 80053 COMPREHEN METABOLIC PANEL: CPT

## 2022-10-14 PROCEDURE — 81003 URINALYSIS AUTO W/O SCOPE: CPT

## 2022-10-14 PROCEDURE — 700102 HCHG RX REV CODE 250 W/ 637 OVERRIDE(OP): Performed by: INTERNAL MEDICINE

## 2022-10-14 PROCEDURE — 83690 ASSAY OF LIPASE: CPT

## 2022-10-14 PROCEDURE — 700105 HCHG RX REV CODE 258: Performed by: INTERNAL MEDICINE

## 2022-10-14 PROCEDURE — 80061 LIPID PANEL: CPT

## 2022-10-14 PROCEDURE — 96374 THER/PROPH/DIAG INJ IV PUSH: CPT

## 2022-10-14 PROCEDURE — 76705 ECHO EXAM OF ABDOMEN: CPT

## 2022-10-14 RX ORDER — BISACODYL 10 MG
10 SUPPOSITORY, RECTAL RECTAL
Status: DISCONTINUED | OUTPATIENT
Start: 2022-10-14 | End: 2022-10-21 | Stop reason: HOSPADM

## 2022-10-14 RX ORDER — OXYCODONE HYDROCHLORIDE 5 MG/1
5 TABLET ORAL
Status: DISCONTINUED | OUTPATIENT
Start: 2022-10-14 | End: 2022-10-21 | Stop reason: HOSPADM

## 2022-10-14 RX ORDER — CARBOXYMETHYLCELLULOSE SODIUM 5 MG/ML
1-2 SOLUTION/ DROPS OPHTHALMIC
Status: DISCONTINUED | OUTPATIENT
Start: 2022-10-14 | End: 2022-10-21 | Stop reason: HOSPADM

## 2022-10-14 RX ORDER — OXYCODONE HYDROCHLORIDE 10 MG/1
10 TABLET ORAL
Status: DISCONTINUED | OUTPATIENT
Start: 2022-10-14 | End: 2022-10-21 | Stop reason: HOSPADM

## 2022-10-14 RX ORDER — ONDANSETRON 2 MG/ML
4 INJECTION INTRAMUSCULAR; INTRAVENOUS ONCE
Status: COMPLETED | OUTPATIENT
Start: 2022-10-14 | End: 2022-10-14

## 2022-10-14 RX ORDER — SODIUM CHLORIDE, SODIUM LACTATE, POTASSIUM CHLORIDE, CALCIUM CHLORIDE 600; 310; 30; 20 MG/100ML; MG/100ML; MG/100ML; MG/100ML
INJECTION, SOLUTION INTRAVENOUS CONTINUOUS
Status: ACTIVE | OUTPATIENT
Start: 2022-10-14 | End: 2022-10-16

## 2022-10-14 RX ORDER — POLYETHYLENE GLYCOL 3350 17 G/17G
1 POWDER, FOR SOLUTION ORAL
Status: DISCONTINUED | OUTPATIENT
Start: 2022-10-14 | End: 2022-10-21 | Stop reason: HOSPADM

## 2022-10-14 RX ORDER — SIMVASTATIN 10 MG
10 TABLET ORAL DAILY
Status: DISCONTINUED | OUTPATIENT
Start: 2022-10-15 | End: 2022-10-15

## 2022-10-14 RX ORDER — ONDANSETRON 4 MG/1
4 TABLET, ORALLY DISINTEGRATING ORAL EVERY 4 HOURS PRN
Status: DISCONTINUED | OUTPATIENT
Start: 2022-10-14 | End: 2022-10-21 | Stop reason: HOSPADM

## 2022-10-14 RX ORDER — ENOXAPARIN SODIUM 100 MG/ML
40 INJECTION SUBCUTANEOUS DAILY
Status: DISCONTINUED | OUTPATIENT
Start: 2022-10-15 | End: 2022-10-20

## 2022-10-14 RX ORDER — HYDROMORPHONE HYDROCHLORIDE 1 MG/ML
0.5 INJECTION, SOLUTION INTRAMUSCULAR; INTRAVENOUS; SUBCUTANEOUS
Status: DISCONTINUED | OUTPATIENT
Start: 2022-10-14 | End: 2022-10-21 | Stop reason: HOSPADM

## 2022-10-14 RX ORDER — AMOXICILLIN 250 MG
2 CAPSULE ORAL 2 TIMES DAILY
Status: DISCONTINUED | OUTPATIENT
Start: 2022-10-14 | End: 2022-10-21 | Stop reason: HOSPADM

## 2022-10-14 RX ORDER — FOLIC ACID 1 MG/1
1 TABLET ORAL SEE ADMIN INSTRUCTIONS
COMMUNITY

## 2022-10-14 RX ORDER — LABETALOL HYDROCHLORIDE 5 MG/ML
10 INJECTION, SOLUTION INTRAVENOUS EVERY 4 HOURS PRN
Status: DISCONTINUED | OUTPATIENT
Start: 2022-10-14 | End: 2022-10-21 | Stop reason: HOSPADM

## 2022-10-14 RX ORDER — ONDANSETRON 2 MG/ML
4 INJECTION INTRAMUSCULAR; INTRAVENOUS EVERY 4 HOURS PRN
Status: DISCONTINUED | OUTPATIENT
Start: 2022-10-14 | End: 2022-10-21 | Stop reason: HOSPADM

## 2022-10-14 RX ORDER — LIOTHYRONINE SODIUM 5 UG/1
2.5 TABLET ORAL DAILY
Status: DISCONTINUED | OUTPATIENT
Start: 2022-10-15 | End: 2022-10-21 | Stop reason: HOSPADM

## 2022-10-14 RX ORDER — CYCLOSPORINE 0.5 MG/ML
1 EMULSION OPHTHALMIC 2 TIMES DAILY
Status: DISCONTINUED | OUTPATIENT
Start: 2022-10-14 | End: 2022-10-14

## 2022-10-14 RX ORDER — CYCLOSPORINE 0.5 MG/ML
1 EMULSION OPHTHALMIC 2 TIMES DAILY
COMMUNITY

## 2022-10-14 RX ORDER — LEVOTHYROXINE SODIUM 0.05 MG/1
50 TABLET ORAL
Status: DISCONTINUED | OUTPATIENT
Start: 2022-10-15 | End: 2022-10-21 | Stop reason: HOSPADM

## 2022-10-14 RX ORDER — SODIUM CHLORIDE, SODIUM LACTATE, POTASSIUM CHLORIDE, CALCIUM CHLORIDE 600; 310; 30; 20 MG/100ML; MG/100ML; MG/100ML; MG/100ML
1000 INJECTION, SOLUTION INTRAVENOUS ONCE
Status: COMPLETED | OUTPATIENT
Start: 2022-10-14 | End: 2022-10-14

## 2022-10-14 RX ADMIN — SENNOSIDES AND DOCUSATE SODIUM 2 TABLET: 50; 8.6 TABLET ORAL at 17:38

## 2022-10-14 RX ADMIN — ONDANSETRON 4 MG: 2 INJECTION INTRAMUSCULAR; INTRAVENOUS at 20:24

## 2022-10-14 RX ADMIN — FENTANYL CITRATE 50 MCG: 50 INJECTION, SOLUTION INTRAMUSCULAR; INTRAVENOUS at 14:28

## 2022-10-14 RX ADMIN — ONDANSETRON 4 MG: 2 INJECTION INTRAMUSCULAR; INTRAVENOUS at 14:26

## 2022-10-14 RX ADMIN — SODIUM CHLORIDE, POTASSIUM CHLORIDE, SODIUM LACTATE AND CALCIUM CHLORIDE 1000 ML: 600; 310; 30; 20 INJECTION, SOLUTION INTRAVENOUS at 15:53

## 2022-10-14 RX ADMIN — FENTANYL CITRATE 50 MCG: 50 INJECTION, SOLUTION INTRAMUSCULAR; INTRAVENOUS at 15:21

## 2022-10-14 RX ADMIN — IOHEXOL 80 ML: 350 INJECTION, SOLUTION INTRAVENOUS at 16:36

## 2022-10-14 RX ADMIN — SODIUM CHLORIDE, POTASSIUM CHLORIDE, SODIUM LACTATE AND CALCIUM CHLORIDE: 600; 310; 30; 20 INJECTION, SOLUTION INTRAVENOUS at 17:39

## 2022-10-14 RX ADMIN — ONDANSETRON 4 MG: 2 INJECTION INTRAMUSCULAR; INTRAVENOUS at 16:08

## 2022-10-14 RX ADMIN — LIDOCAINE HYDROCHLORIDE 30 ML: 20 SOLUTION OROPHARYNGEAL at 14:28

## 2022-10-14 RX ADMIN — HYDROMORPHONE HYDROCHLORIDE 0.5 MG: 1 INJECTION, SOLUTION INTRAMUSCULAR; INTRAVENOUS; SUBCUTANEOUS at 16:08

## 2022-10-14 RX ADMIN — OXYCODONE HYDROCHLORIDE 10 MG: 10 TABLET ORAL at 23:06

## 2022-10-14 RX ADMIN — HYDROMORPHONE HYDROCHLORIDE 0.5 MG: 1 INJECTION, SOLUTION INTRAMUSCULAR; INTRAVENOUS; SUBCUTANEOUS at 20:23

## 2022-10-14 ASSESSMENT — COGNITIVE AND FUNCTIONAL STATUS - GENERAL
SUGGESTED CMS G CODE MODIFIER MOBILITY: CJ
CLIMB 3 TO 5 STEPS WITH RAILING: A LITTLE
SUGGESTED CMS G CODE MODIFIER DAILY ACTIVITY: CH
WALKING IN HOSPITAL ROOM: A LITTLE
DAILY ACTIVITIY SCORE: 24
MOBILITY SCORE: 21
STANDING UP FROM CHAIR USING ARMS: A LITTLE

## 2022-10-14 ASSESSMENT — PATIENT HEALTH QUESTIONNAIRE - PHQ9
1. LITTLE INTEREST OR PLEASURE IN DOING THINGS: NOT AT ALL
2. FEELING DOWN, DEPRESSED, IRRITABLE, OR HOPELESS: NOT AT ALL
SUM OF ALL RESPONSES TO PHQ9 QUESTIONS 1 AND 2: 0

## 2022-10-14 ASSESSMENT — LIFESTYLE VARIABLES
HAVE YOU EVER FELT YOU SHOULD CUT DOWN ON YOUR DRINKING: NO
TOTAL SCORE: 0
EVER HAD A DRINK FIRST THING IN THE MORNING TO STEADY YOUR NERVES TO GET RID OF A HANGOVER: NO
EVER FELT BAD OR GUILTY ABOUT YOUR DRINKING: NO
ALCOHOL_USE: NO
HAVE PEOPLE ANNOYED YOU BY CRITICIZING YOUR DRINKING: NO
TOTAL SCORE: 0
CONSUMPTION TOTAL: NEGATIVE
ON A TYPICAL DAY WHEN YOU DRINK ALCOHOL HOW MANY DRINKS DO YOU HAVE: 0
AVERAGE NUMBER OF DAYS PER WEEK YOU HAVE A DRINK CONTAINING ALCOHOL: 0
HOW MANY TIMES IN THE PAST YEAR HAVE YOU HAD 5 OR MORE DRINKS IN A DAY: 0
TOTAL SCORE: 0

## 2022-10-14 ASSESSMENT — ENCOUNTER SYMPTOMS
NAUSEA: 1
BLURRED VISION: 0
VOMITING: 0
CONSTIPATION: 0
ABDOMINAL PAIN: 1
DEPRESSION: 0
DIARRHEA: 1
CHILLS: 0
FEVER: 0
MYALGIAS: 0
DIZZINESS: 0
SHORTNESS OF BREATH: 0
HEADACHES: 0

## 2022-10-14 ASSESSMENT — PAIN DESCRIPTION - PAIN TYPE
TYPE: ACUTE PAIN
TYPE: ACUTE PAIN

## 2022-10-14 ASSESSMENT — FIBROSIS 4 INDEX
FIB4 SCORE: 1
FIB4 SCORE: 1

## 2022-10-14 NOTE — H&P
Hospital Medicine History & Physical Note    Date of Service  10/14/2022    Primary Care Physician  Juvencio Thomason D.O.    Consultants  N/A    Code Status  Full Code    Chief Complaint  Chief Complaint   Patient presents with    Abdominal Pain     RUQ       History of Presenting Illness  Miranda Rose is a 72 y.o. female who presented 10/14/2022 with abdominal pain.  Patient reports pain started suddenly this afternoon located in the epigastrium and right upper quadrant.  She reports severe 10 out of 10 pain when it started and it was aching.  Reports that it is radiating into her back.  She did have associated nausea, no vomiting.  She does have chronic diarrhea that she report reports is unchanged.  Patient denies fevers, chills, chest pain, shortness of breath.  Of note patient was recently diagnosed with scleroderma she is on weekly methotrexate with folic acid.  She denies alcohol use since she got diagnosed, reports before that she only had 1 glass of wine weekly.    ER vital signs significant for hypertension otherwise stable.  Labs significant for lipase greater than 3000, Triglycerides 52.  CT abdomen pelvis pending.    I discussed the plan of care with patient, family, and bedside RN.    Review of Systems  Review of Systems   Constitutional:  Positive for malaise/fatigue. Negative for chills and fever.   HENT:  Negative for ear discharge.    Eyes:  Negative for blurred vision.   Respiratory:  Negative for shortness of breath.    Cardiovascular:  Negative for chest pain.   Gastrointestinal:  Positive for abdominal pain, diarrhea (chronic) and nausea. Negative for constipation and vomiting.   Genitourinary:  Negative for dysuria.   Musculoskeletal:  Negative for myalgias.   Skin:  Negative for rash.   Neurological:  Negative for dizziness and headaches.   Psychiatric/Behavioral:  Negative for depression.    All other systems reviewed and are negative.    Past Medical History   has a past medical  history of Anxiety, Arrhythmia, Arthritis, Bowel habit changes, Lenin Barr virus infection, Herpes, High blood pressure (6/13/2018), High cholesterol, Hypercholesteremia, Hypothyroid, Pneumonia (2019), Thyroid disease, and Tinnitus.    Surgical History   has a past surgical history that includes lumpectomy; hip replacement, total; other orthopedic surgery (Left, 2007); abdominal hysterectomy total (2004); primary c section; other (2010); colonoscopy; and myringotomy, bilateral, with insertion of tympanostomy d (Bilateral, 5/10/2021).     Family History  family history includes Cancer in her father; Diabetes in her father and mother; Hyperlipidemia in her father and mother; Hypertension in her father and mother.   Family history reviewed with patient. There is no family history that is pertinent to the chief complaint.     Social History   reports that she quit smoking about 37 years ago. She has never used smokeless tobacco. She reports current alcohol use of about 1.0 oz per week. She reports that she does not use drugs.    Allergies  Allergies   Allergen Reactions    Other Food Anaphylaxis and Hives     Strawberry HIVES  Yellow wallace (ANAPHYLAXIS)      Codeine Vomiting and Nausea    Hydrocodone-Acetaminophen Nausea     nausea (Vicodin) Allergy conversion clean-up. Please validate at next visit.; **PCX CONVERTED DATA**       Medications  Prior to Admission Medications   Prescriptions Last Dose Informant Patient Reported? Taking?   5-Hydroxytryptophan (5-HTP PO) 10/13/2022 at PM Patient Yes No   Sig: Take 1 Tab by mouth every day.   Bioflavonoid Products (YKARA C PO) 10/13/2022 at AM Patient Yes No   Sig: Take 1 Tablet by mouth every day.   CRANBERRY PO FEW DAYS AGO at [facility] Patient Yes No   Sig: Take 1 Tablet by mouth every day.   Cholecalciferol (VITAMIN D3) 2000 UNIT Cap 10/13/2022 at PM Patient Yes No   Sig: Take 2,000 Units by mouth every evening.   Coenzyme Q10 (CO Q 10 PO) FEW DAYS AGO at [facility] Patient Yes No    Sig: Take 1 Tablet by mouth every day.   Flaxseed, Linseed, (FLAX SEEDS PO) FEW DAYS AGO at [facility] Patient Yes No   Sig: Take 1 Tablet by mouth every day.   Glucosamine-Chondroitin-MSM (TRIPLE FLEX PO) FEW DAYS AGO at [facility] Patient Yes No   Sig: Take 1 Tablet by mouth every day.   LUTEIN-ZEAXANTHIN PO FEW DAYS AGO at [facility] Patient Yes No   Sig: Take 1 Tab by mouth every day.   Nutritional Supplements (GRAPESEED EXTRACT PO) FEW DAYS AGO at [facility] Patient Yes No   Sig: Take 1 Tablet by mouth every day.   Probiotic Product (PROBIOTIC DAILY PO) 10/13/2022 at PM Patient Yes No   Sig: Take 1 Cap by mouth every day.   TURMERIC PO FEW DAYS AGO at [facility] Patient Yes No   Sig: Take 1 Tablet by mouth every day.   cyclosporin (RESTASIS) 0.05 % ophthalmic emulsion 10/14/2022 at AM Patient Yes Yes   Sig: Administer 1 Drop into both eyes 2 times a day.   estradiol (ESTRACE) 0.1 MG/GM vaginal cream 10/13/2022 at PM Patient Yes No   Sig: Insert 1 g into the vagina see administration instructions. Uses every 4 days   folic acid (FOLVITE) 1 MG Tab 10/14/2022 at AM Patient Yes Yes   Sig: Take 1 mg by mouth see administration instructions. Takes on Tuesday, Wednesday, Thursday, Friday, and Saturday   levothyroxine (SYNTHROID) 50 MCG Tab 10/14/2022 at AM Patient Yes No   Sig: Take 50 mcg by mouth every morning on an empty stomach.   liothyronine (CYTOMEL) 5 MCG Tab 10/14/2022 at AM Patient Yes No   Sig: Take 2.5 mg by mouth every day.   methotrexate 2.5 MG Tab 10/10/2022 at AM Patient Yes Yes   Sig: Take 7.5 mg by mouth see administration instructions. Take 7.5 mg on Sunday night   Take 7.5 mg on Monday morning   simvastatin (ZOCOR) 10 MG Tab 10/14/2022 at AM Patient Yes No   Sig: Take 10 mg by mouth every day.      Facility-Administered Medications: None       Physical Exam  Temp:  [36.2 °C (97.2 °F)] 36.2 °C (97.2 °F)  Pulse:  [63-78] 63  Resp:  [16-20] 16  BP: (105-151)/(61-82) 106/75  SpO2:  [97 %-100 %] 98 %  Blood Pressure : 105/61    Temperature: 36.2 °C (97.2 °F)   Pulse: 76   Respiration: 18   Pulse Oximetry: 98 %       Physical Exam  Vitals and nursing note reviewed.   Constitutional:       General: She is not in acute distress.     Appearance: She is ill-appearing.   HENT:      Head: Normocephalic and atraumatic.      Right Ear: External ear normal.      Left Ear: External ear normal.      Nose: Nose normal.      Mouth/Throat:      Pharynx: Oropharynx is clear.   Eyes:      Extraocular Movements: Extraocular movements intact.      Conjunctiva/sclera: Conjunctivae normal.      Pupils: Pupils are equal, round, and reactive to light.   Cardiovascular:      Rate and Rhythm: Normal rate and regular rhythm.      Pulses: Normal pulses.      Heart sounds: Normal heart sounds.   Pulmonary:      Effort: Pulmonary effort is normal. No respiratory distress.      Breath sounds: Normal breath sounds. No wheezing or rales.   Abdominal:      General: Abdomen is flat. There is no distension.      Palpations: Abdomen is soft.      Tenderness: There is abdominal tenderness. There is guarding.   Musculoskeletal:         General: Normal range of motion.      Cervical back: Normal range of motion and neck supple.      Right lower leg: No edema.      Left lower leg: No edema.   Skin:     General: Skin is warm and dry.   Neurological:      General: No focal deficit present.      Mental Status: She is alert and oriented to person, place, and time.      Cranial Nerves: No cranial nerve deficit.   Psychiatric:         Mood and Affect: Mood normal.         Behavior: Behavior normal.       Laboratory:  Recent Labs     10/14/22  1412   WBC 9.3   RBC 4.51   HEMOGLOBIN 13.7   HEMATOCRIT 41.3   MCV 91.6   MCH 30.4   MCHC 33.2*   RDW 48.9   PLATELETCT 333   MPV 9.7     Recent Labs     10/14/22  1412   SODIUM 137   POTASSIUM 4.1   CHLORIDE 101   CO2 21   GLUCOSE 141*   BUN 15   CREATININE 0.70   CALCIUM 9.5     Recent Labs     10/14/22  1412   ALTSGPT 17   ASTSGOT 19    ALKPHOSPHAT 75   TBILIRUBIN 0.3   LIPASE >3000*   GLUCOSE 141*         No results for input(s): NTPROBNP in the last 72 hours.  Recent Labs     10/14/22  1412   TRIGLYCERIDE 52   HDL 98   *     No results for input(s): TROPONINT in the last 72 hours.    Imaging:  US-RUQ   Final Result      Negative gallbladder/right upper quadrant ultrasound      CT-ABDOMEN-PELVIS WITH    (Results Pending)       no X-Ray or EKG requiring interpretation    Assessment/Plan:  Justification for Admission Status  I anticipate this patient will require at least two midnights for appropriate medical management, necessitating inpatient admission because acute pancreatitis needing aggressive IV fluid hydration, unclear cause at this time.  IgG4 sent out for possible autoimmune cause especially in the setting of scleroderma.      * Severe acute pancreatitis- (present on admission)  Assessment & Plan  Unclear cause at this time, no gallstones on right upper quadrant ultrasound, lipids within normal limits and patient does not drink alcohol  Check IgG4 level for possible autoimmune pancreatitis  Discussed with pharmacy they will evaluate patient's supplements to see if any of these could be cause of pancreatitis  Aggressive IV fluid hydration  Clear liquid diet, advance as tolerated  Pain control  CT scan pending    Scleroderma (HCC)- (present on admission)  Assessment & Plan  Is treated by specialist  Hold methotrexate for now, usually takes Sunday night/Monday morning    Hypothyroid- (present on admission)  Assessment & Plan  Continue home levothyroxine and Cytomel    Hyperlipidemia- (present on admission)  Assessment & Plan  Continue statin      VTE prophylaxis: enoxaparin ppx

## 2022-10-14 NOTE — ASSESSMENT & PLAN NOTE
- continue to hold off on her simvastatin for now, as it is considered class I drug for drug-induced pancreatitis.

## 2022-10-14 NOTE — ASSESSMENT & PLAN NOTE
-Unclear cause at this time, no gallstones on right upper quadrant ultrasound, lipids within normal limits and patient does not drink alcohol.  She does have scleroderma, which raises the possibility of autoimmune pancreatitis.  MRCP showed no cholecystodocholithiasis or biliary dilatation. IgG4 level is normal.   -She was also taking some herbal supplements, which could have contributed.  -Repeat CT showed no focal fluid collection, with improvement in pancreatic inflammation. Lipase has improved significantly and is now normal.  Abdominal pain is significantly improved, with only residual referred back pain. Tolerating GI soft diet.  -showed signs of volume overload.  Continue diuresis.  Off IV fluids.  -Continue pain management with oxycodone and IV Dilaudid.    -So far no signs of infection, but continue to monitor.  No signs of pancreatic fluid collection trend on the MRI.  Leukocytosis likely stress related, trending down.  Continue to trend WBC count, watch for fevers.

## 2022-10-14 NOTE — ED NOTES
Pt medicated for Pn, denies any further needs at this time, no distress noted;    Pt stated that she will let us know when she is able to provide urine specimen;

## 2022-10-14 NOTE — ED PROVIDER NOTES
ED Provider Note    CHIEF COMPLAINT  Chief Complaint   Patient presents with    Abdominal Pain     RUQ         HPI  Miranda Rose is a 72 y.o. female who presents abdominal pain.  Pain started about 1245.  Located epigastrium and right upper quadrant.  Aching character.  Severe 10/10 now down to a 7/10.  No associated nausea vomiting.  She suffers from chronic diarrhea that is unchanged.  Denies chest pain, shortness of breath pleuritic symptoms.  Has not had fevers chills cough.    REVIEW OF SYSTEMS  As per HPI  All other systems are negative.     PAST MEDICAL HISTORY  Past Medical History:   Diagnosis Date    Anxiety     Arrhythmia     PVC's    Arthritis     osteo    Bowel habit changes     constipation    Lenin Barr virus infection     Herpes     High blood pressure 2018    High cholesterol     Hypercholesteremia     Hypothyroid     Pneumonia 2019    Thyroid disease     Tinnitus        FAMILY HISTORY  Family History   Problem Relation Age of Onset    Hyperlipidemia Mother     Hypertension Mother     Diabetes Mother     Hyperlipidemia Father     Hypertension Father     Diabetes Father     Cancer Father        SOCIAL HISTORY  Social History     Tobacco Use    Smoking status: Former     Types: Cigarettes     Quit date:      Years since quittin.8    Smokeless tobacco: Never   Vaping Use    Vaping Use: Never used   Substance Use Topics    Alcohol use: Yes     Alcohol/week: 1.0 oz     Types: 2 Glasses of wine per week     Comment: 1 drink a day    Drug use: No     Types: Marijuana       SURGICAL HISTORY  Past Surgical History:   Procedure Laterality Date    MYRINGOTOMY, BILATERAL, WITH INSERTION OF TYMPANOSTOMY D Bilateral 5/10/2021    Procedure: MYRINGOTOMY, BILATERAL, WITH INSERTION OF TYMPANOSTOMY TUBE IF INDICATED - EUSTACHIAN TUBE WITH BALLOON DILATION.;  Surgeon: Peterson Willard M.D.;  Location: SURGERY SAME DAY [facility];  Service: Ent    OTHER      hemorroidectomy    OTHER ORTHOPEDIC  SURGERY Left 2007    hip replaced    ABDOMINAL HYSTERECTOMY TOTAL  2004    COLONOSCOPY      HIP REPLACEMENT, TOTAL      LUMPECTOMY      PRIMARY C SECTION         CURRENT MEDICATIONS  Home Medications       Reviewed by Katalina Rojo PhT (Pharmacy Tech) on 10/14/22 at 1436  Med List Status: Complete     Medication Last Dose Status   5-Hydroxytryptophan (5-HTP PO) 10/13/2022 Active   Bioflavonoid Products (KYARA C PO) 10/13/2022 Active   Cholecalciferol (VITAMIN D3) 2000 UNIT Cap 10/13/2022 Active   Coenzyme Q10 (CO Q 10 PO) FEW DAYS AGO Active   CRANBERRY PO FEW DAYS AGO Active   cyclosporin (RESTASIS) 0.05 % ophthalmic emulsion 10/14/2022 Active   estradiol (ESTRACE) 0.1 MG/GM vaginal cream 10/13/2022 Active   Flaxseed, Linseed, (FLAX SEEDS PO) FEW DAYS AGO Active   folic acid (FOLVITE) 1 MG Tab 10/14/2022 Active   Glucosamine-Chondroitin-MSM (TRIPLE FLEX PO) FEW DAYS AGO Active   levothyroxine (SYNTHROID) 50 MCG Tab 10/14/2022 Active   liothyronine (CYTOMEL) 5 MCG Tab 10/14/2022 Active   LUTEIN-ZEAXANTHIN PO FEW DAYS AGO Active   methotrexate 2.5 MG Tab 10/10/2022 Active   Nutritional Supplements (GRAPESEED EXTRACT PO) FEW DAYS AGO Active   Probiotic Product (PROBIOTIC DAILY PO) 10/13/2022 Active   simvastatin (ZOCOR) 10 MG Tab 10/14/2022 Active   TURMERIC PO FEW DAYS AGO Active                    ALLERGIES  Allergies   Allergen Reactions    Other Food Anaphylaxis and Hives     Strawberry HIVES  Yellow wallace (ANAPHYLAXIS)      Codeine Vomiting and Nausea    Hydrocodone-Acetaminophen Nausea     nausea (Vicodin) Allergy conversion clean-up. Please validate at next visit.; **PCX CONVERTED DATA**       PHYSICAL EXAM  VITAL SIGNS: /82   Pulse 75   Temp 36.2 °C (97.2 °F) (Temporal)   Resp 18   SpO2 100%   Constitutional: Awake and alert  HENT: Moist mucous membranes  Eyes: Sclera white  Neck: Normal range of motion  Cardiovascular: Normal heart rate, Normal rhythm  Thorax & Lungs: Normal breath sounds, No  respiratory distress, No wheezing, No chest tenderness.   Abdomen: Tender to palpation epigastrium and right upper quadrant.  No lower abdominal tenderness.  Skin: No rash.   Back: No tenderness, No CVA tenderness.   Extremities: Intact, symmetric distal pulses, no edema.  Neurologic: Grossly normal    RADIOLOGY/PROCEDURES  US-RUQ   Final Result      Negative gallbladder/right upper quadrant ultrasound      CT-ABDOMEN-PELVIS WITH    (Results Pending)      Imaging is interpreted by radiologist    Labs:   Results for orders placed or performed during the hospital encounter of 10/14/22   CBC with Differential   Result Value Ref Range    WBC 9.3 4.8 - 10.8 K/uL    RBC 4.51 4.20 - 5.40 M/uL    Hemoglobin 13.7 12.0 - 16.0 g/dL    Hematocrit 41.3 37.0 - 47.0 %    MCV 91.6 81.4 - 97.8 fL    MCH 30.4 27.0 - 33.0 pg    MCHC 33.2 (L) 33.6 - 35.0 g/dL    RDW 48.9 35.9 - 50.0 fL    Platelet Count 333 164 - 446 K/uL    MPV 9.7 9.0 - 12.9 fL    Neutrophils-Polys 78.30 (H) 44.00 - 72.00 %    Lymphocytes 14.50 (L) 22.00 - 41.00 %    Monocytes 6.40 0.00 - 13.40 %    Eosinophils 0.10 0.00 - 6.90 %    Basophils 0.10 0.00 - 1.80 %    Immature Granulocytes 0.60 0.00 - 0.90 %    Nucleated RBC 0.00 /100 WBC    Neutrophils (Absolute) 7.31 (H) 2.00 - 7.15 K/uL    Lymphs (Absolute) 1.35 1.00 - 4.80 K/uL    Monos (Absolute) 0.60 0.00 - 0.85 K/uL    Eos (Absolute) 0.01 0.00 - 0.51 K/uL    Baso (Absolute) 0.01 0.00 - 0.12 K/uL    Immature Granulocytes (abs) 0.06 0.00 - 0.11 K/uL    NRBC (Absolute) 0.00 K/uL   Complete Metabolic Panel   Result Value Ref Range    Sodium 137 135 - 145 mmol/L    Potassium 4.1 3.6 - 5.5 mmol/L    Chloride 101 96 - 112 mmol/L    Co2 21 20 - 33 mmol/L    Anion Gap 15.0 7.0 - 16.0    Glucose 141 (H) 65 - 99 mg/dL    Bun 15 8 - 22 mg/dL    Creatinine 0.70 0.50 - 1.40 mg/dL    Calcium 9.5 8.4 - 10.2 mg/dL    AST(SGOT) 19 12 - 45 U/L    ALT(SGPT) 17 2 - 50 U/L    Alkaline Phosphatase 75 30 - 99 U/L    Total Bilirubin 0.3  0.1 - 1.5 mg/dL    Albumin 4.5 3.2 - 4.9 g/dL    Total Protein 7.3 6.0 - 8.2 g/dL    Globulin 2.8 1.9 - 3.5 g/dL    A-G Ratio 1.6 g/dL   Lipase   Result Value Ref Range    Lipase >3000 (H) 7 - 58 U/L   ESTIMATED GFR   Result Value Ref Range    GFR (CKD-EPI) 92 >60 mL/min/1.73 m 2   Lipid Profile   Result Value Ref Range    Cholesterol,Tot 214 (H) 100 - 199 mg/dL    Triglycerides 52 0 - 149 mg/dL    HDL 98 >=40 mg/dL     (H) <100 mg/dL       Medications   senna-docusate (PERICOLACE or SENOKOT S) 8.6-50 MG per tablet 2 Tablet (has no administration in time range)     And   polyethylene glycol/lytes (MIRALAX) PACKET 1 Packet (has no administration in time range)     And   magnesium hydroxide (MILK OF MAGNESIA) suspension 30 mL (has no administration in time range)     And   bisacodyl (DULCOLAX) suppository 10 mg (has no administration in time range)   lactated ringers infusion (has no administration in time range)   enoxaparin (Lovenox) inj 40 mg (has no administration in time range)   ondansetron (ZOFRAN) syringe/vial injection 4 mg (has no administration in time range)   ondansetron (ZOFRAN ODT) dispertab 4 mg (has no administration in time range)   labetalol (NORMODYNE/TRANDATE) injection 10 mg (has no administration in time range)   Pharmacy Consult Request ...Pain Management Review 1 Each (has no administration in time range)   oxyCODONE immediate-release (ROXICODONE) tablet 5 mg (has no administration in time range)     Or   oxyCODONE immediate release (ROXICODONE) tablet 10 mg (has no administration in time range)     Or   HYDROmorphone (Dilaudid) injection 0.5 mg (has no administration in time range)   fentaNYL (SUBLIMAZE) injection 50 mcg (50 mcg Intravenous Given 10/14/22 4631)   ondansetron (ZOFRAN) syringe/vial injection 4 mg (4 mg Intravenous Given 10/14/22 3766)   hyoscyamine-lidocaine-Maalox (GI Cocktail) oral susp cup 30 mL (30 mL Oral Given 10/14/22 0844)   fentaNYL (SUBLIMAZE) injection 50 mcg  (50 mcg Intravenous Given 10/14/22 1521)   lactated ringers (LR) bolus (1,000 mL Intravenous New Bag 10/14/22 2451)       Measures:  -Hydration: Patient was given IV fluids because of pancreatitis.  Oral fluids were not appropriate because of a possible surgical problem.  On recheck the patient was stable    COURSE & MEDICAL DECISION MAKING  Patient presents with epigastric abdominal pain.  Differential includes: cholelithiasis, cholecystitis, choledocholithiasis, cholangitis, pancreatitis, gastritis, hepatitis, hepatic abscess, portal vein thrombosis, mesenteric ischemia, AAA, bowel obstruction, bowel perforation, appendicitis, diverticulitis, pyelonephritis, ACS, PE, pneumonia.  Unlikely latter with symptoms described.    Treated with fentanyl given GI cocktail.  Given Zofran.      Data returns as above.  Unclear cause of pancreatitis.  Added lipids.  Will obtain CT scan of the abdomen and pelvis.  Consult hospitalist for admission.    FINAL IMPRESSION  1.  Acute pancreatitis        This dictation was created using voice recognition software. The accuracy of the dictation is limited to the abilities of the software.  The nursing notes were reviewed and certain aspects of this information were incorporated into this note.    Electronically signed by: Francisco Dior M.D., 10/14/2022 3:06 PM

## 2022-10-14 NOTE — ASSESSMENT & PLAN NOTE
She has been recently started on methotrexate.    She is back on home methotrexate, takes every 7 days on Sunday night and Monday morning

## 2022-10-15 LAB
ANION GAP SERPL CALC-SCNC: 7 MMOL/L (ref 7–16)
BUN SERPL-MCNC: 12 MG/DL (ref 8–22)
CALCIUM SERPL-MCNC: 7.8 MG/DL (ref 8.4–10.2)
CHLORIDE SERPL-SCNC: 102 MMOL/L (ref 96–112)
CO2 SERPL-SCNC: 26 MMOL/L (ref 20–33)
CREAT SERPL-MCNC: 0.71 MG/DL (ref 0.5–1.4)
ERYTHROCYTE [DISTWIDTH] IN BLOOD BY AUTOMATED COUNT: 50.9 FL (ref 35.9–50)
GFR SERPLBLD CREATININE-BSD FMLA CKD-EPI: 90 ML/MIN/1.73 M 2
GLUCOSE SERPL-MCNC: 194 MG/DL (ref 65–99)
HCT VFR BLD AUTO: 42.5 % (ref 37–47)
HGB BLD-MCNC: 13.8 G/DL (ref 12–16)
MCH RBC QN AUTO: 30.5 PG (ref 27–33)
MCHC RBC AUTO-ENTMCNC: 32.5 G/DL (ref 33.6–35)
MCV RBC AUTO: 94 FL (ref 81.4–97.8)
PLATELET # BLD AUTO: 294 K/UL (ref 164–446)
PMV BLD AUTO: 9.9 FL (ref 9–12.9)
POTASSIUM SERPL-SCNC: 4.6 MMOL/L (ref 3.6–5.5)
RBC # BLD AUTO: 4.52 M/UL (ref 4.2–5.4)
SODIUM SERPL-SCNC: 135 MMOL/L (ref 135–145)
TSH SERPL DL<=0.005 MIU/L-ACNC: 0.91 UIU/ML (ref 0.38–5.33)
WBC # BLD AUTO: 13.5 K/UL (ref 4.8–10.8)

## 2022-10-15 PROCEDURE — 99233 SBSQ HOSP IP/OBS HIGH 50: CPT | Performed by: INTERNAL MEDICINE

## 2022-10-15 PROCEDURE — 85027 COMPLETE CBC AUTOMATED: CPT

## 2022-10-15 PROCEDURE — 84443 ASSAY THYROID STIM HORMONE: CPT

## 2022-10-15 PROCEDURE — 80048 BASIC METABOLIC PNL TOTAL CA: CPT

## 2022-10-15 PROCEDURE — 700105 HCHG RX REV CODE 258: Performed by: INTERNAL MEDICINE

## 2022-10-15 PROCEDURE — 94760 N-INVAS EAR/PLS OXIMETRY 1: CPT

## 2022-10-15 PROCEDURE — A9270 NON-COVERED ITEM OR SERVICE: HCPCS | Performed by: INTERNAL MEDICINE

## 2022-10-15 PROCEDURE — 770006 HCHG ROOM/CARE - MED/SURG/GYN SEMI*

## 2022-10-15 PROCEDURE — 700102 HCHG RX REV CODE 250 W/ 637 OVERRIDE(OP): Performed by: INTERNAL MEDICINE

## 2022-10-15 PROCEDURE — 36415 COLL VENOUS BLD VENIPUNCTURE: CPT

## 2022-10-15 PROCEDURE — 700111 HCHG RX REV CODE 636 W/ 250 OVERRIDE (IP): Performed by: INTERNAL MEDICINE

## 2022-10-15 RX ADMIN — LEVOTHYROXINE SODIUM 50 MCG: 50 TABLET ORAL at 06:12

## 2022-10-15 RX ADMIN — HYDROMORPHONE HYDROCHLORIDE 0.5 MG: 1 INJECTION, SOLUTION INTRAMUSCULAR; INTRAVENOUS; SUBCUTANEOUS at 17:27

## 2022-10-15 RX ADMIN — ONDANSETRON 4 MG: 2 INJECTION INTRAMUSCULAR; INTRAVENOUS at 17:27

## 2022-10-15 RX ADMIN — LIOTHYRONINE SODIUM 2.5 MCG: 5 TABLET ORAL at 06:12

## 2022-10-15 RX ADMIN — SIMVASTATIN 10 MG: 10 TABLET, FILM COATED ORAL at 06:12

## 2022-10-15 RX ADMIN — HYDROMORPHONE HYDROCHLORIDE 0.5 MG: 1 INJECTION, SOLUTION INTRAMUSCULAR; INTRAVENOUS; SUBCUTANEOUS at 00:53

## 2022-10-15 RX ADMIN — SODIUM CHLORIDE, POTASSIUM CHLORIDE, SODIUM LACTATE AND CALCIUM CHLORIDE: 600; 310; 30; 20 INJECTION, SOLUTION INTRAVENOUS at 12:41

## 2022-10-15 RX ADMIN — OXYCODONE HYDROCHLORIDE 10 MG: 10 TABLET ORAL at 16:05

## 2022-10-15 RX ADMIN — OXYCODONE HYDROCHLORIDE 10 MG: 10 TABLET ORAL at 03:56

## 2022-10-15 RX ADMIN — SENNOSIDES AND DOCUSATE SODIUM 2 TABLET: 50; 8.6 TABLET ORAL at 06:12

## 2022-10-15 RX ADMIN — SENNOSIDES AND DOCUSATE SODIUM 2 TABLET: 50; 8.6 TABLET ORAL at 17:28

## 2022-10-15 RX ADMIN — ONDANSETRON 4 MG: 2 INJECTION INTRAMUSCULAR; INTRAVENOUS at 12:45

## 2022-10-15 RX ADMIN — ENOXAPARIN SODIUM 40 MG: 40 INJECTION SUBCUTANEOUS at 18:00

## 2022-10-15 RX ADMIN — HYDROMORPHONE HYDROCHLORIDE 0.5 MG: 1 INJECTION, SOLUTION INTRAMUSCULAR; INTRAVENOUS; SUBCUTANEOUS at 12:38

## 2022-10-15 RX ADMIN — HYDROMORPHONE HYDROCHLORIDE 0.5 MG: 1 INJECTION, SOLUTION INTRAMUSCULAR; INTRAVENOUS; SUBCUTANEOUS at 06:08

## 2022-10-15 RX ADMIN — OXYCODONE HYDROCHLORIDE 10 MG: 10 TABLET ORAL at 10:17

## 2022-10-15 RX ADMIN — SODIUM CHLORIDE, POTASSIUM CHLORIDE, SODIUM LACTATE AND CALCIUM CHLORIDE: 600; 310; 30; 20 INJECTION, SOLUTION INTRAVENOUS at 17:28

## 2022-10-15 RX ADMIN — SODIUM CHLORIDE, POTASSIUM CHLORIDE, SODIUM LACTATE AND CALCIUM CHLORIDE: 600; 310; 30; 20 INJECTION, SOLUTION INTRAVENOUS at 22:26

## 2022-10-15 ASSESSMENT — PAIN DESCRIPTION - PAIN TYPE
TYPE: ACUTE PAIN
TYPE: ACUTE PAIN

## 2022-10-15 NOTE — PROGRESS NOTES
4 Eyes Skin Assessment Completed by MAGGY Jo and MAGGY Jean.    Head WDL  Ears WDL  Nose WDL  Mouth WDL  Neck WDL  Breast/Chest WDL  Shoulder Blades WDL  Spine WDL  (R) Arm/Elbow/Hand WDL  (L) Arm/Elbow/Hand WDL  Abdomen WDL  Groin WDL  Scrotum/Coccyx/Buttocks WDL  (R) Leg WDL  (L) Leg WDL  (R) Heel/Foot/Toe WDL  (L) Heel/Foot/Toe WDL          Devices In Places Blood Pressure Cuff and Pulse Ox      Interventions In Place N/A    Possible Skin Injury No    Pictures Uploaded Into Epic N/A  Wound Consult Placed N/A  RN Wound Prevention Protocol Ordered No

## 2022-10-15 NOTE — CARE PLAN
The patient is Stable - Low risk of patient condition declining or worsening    Shift Goals  Clinical Goals: patient's pain level will be 3/10 or less  throughout the shift  Patient Goals: pain mngt    Progress made toward(s) clinical / shift goals:  Patient is still complaining of pain to abdomen. Rest promoted.IV fluid started as ordered    Patient is not progressing towards the following goals:    Problem: Pain - Standard  Goal: Alleviation of pain or a reduction in pain to the patient’s comfort goal  Outcome: Progressing     Problem: Fluid Volume  Goal: Fluid volume balance will be maintained  Outcome: Progressing

## 2022-10-15 NOTE — CARE PLAN
The patient is Stable - Low risk of patient condition declining or worsening    Shift Goals  Clinical Goals: Pain to stay a 5/10 or less this shift  Patient Goals: Rest this shift  Family Goals: N/A    Progress made toward(s) clinical / shift goals:        Patient is not progressing towards the following goals:      Problem: Pain - Standard  Goal: Alleviation of pain or a reduction in pain to the patient’s comfort goal  Outcome: Not Progressing

## 2022-10-15 NOTE — PROGRESS NOTES
Received report from ER Nurse at 2489. Patient to floor at [phone]. Admission care rendered. Placed patient comfortably on bed. Initial v/s taken by CNA. Patient is awake and alert x 4 .Complains of Nausea and pain to abdomen. IV fluid started as ordered.Basic assessment rendered.

## 2022-10-15 NOTE — PROGRESS NOTES
Hospital Medicine Daily Progress Note    Date of Service  10/15/2022    Chief Complaint  Upper abdominal pain    Hospital Course  Miranda Rose is a 72 y.o. female with anxiety, hyperlipidemia, hypothyroidism, chronic diarrhea, and recent diagnosis of scleroderma on weekly methotrexate due to folic acid, admitted 10/14/2022 with acute onset upper abdominal pain, radiating to the back, with associated nausea.  Bowel movements remained unchanged.  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  Evaluation showed significant elevated lipase of greater than 3000, triglycerides 52.  WBC count, electrolytes and renal function are normal.  LFTs are normal.  She had a negative right upper quadrant ultrasound.  She has acute pancreatitis, and was placed on aggressive IV fluid hydration, analgesics, and bowel rest.    Interval Problem Update  10/15/2022 - I reviewed the patient's chart. There were no significant overnight events. Remains hemodynamically stable and afebrile. Stable on RA.  WBC 13,500.  Electrolytes and renal function are normal.  Glucose 194.  CT of the abdomen showed findings consistent with severe acute pancreatitis, with edema seen about the pancreas extending into the mesenteric root and caroline hepatis, and extending into the anterior pararenal spaces bilaterally.    > I have personally seen and examined the patient today.  Pain better but still significant today, rated 8 out of 10 in severity in the upper abdomen.  Has nausea, but no vomiting.  Tolerating clear liquids.  No chest pain or shortness of breath.  She shares that she had a significant family history of bile duct and gallbladder issues.      I have discussed this patient's plan of care and discharge plan at IDT rounds today with Case Management, Nursing, Nursing leadership, and other members of the IDT team.    Consultants/Specialty  None    Code Status  Full  Code    Disposition  Patient is not medically cleared for discharge.   Anticipate discharge to to home with close outpatient follow-up.  I have placed the appropriate orders for post-discharge needs.    Review of Systems  ROS     Pertinent positives/negatives as mentioned above.     A complete review of systems was personally done by me. All other systems were negative.       Physical Exam  Temp:  [36.1 °C (97 °F)-37 °C (98.6 °F)] 37 °C (98.6 °F)  Pulse:  [63-81] 74  Resp:  [16-20] 16  BP: (105-151)/(61-82) 108/70  SpO2:  [90 %-100 %] 92 %    Physical Exam  Vitals reviewed.   Constitutional:       General: She is not in acute distress.     Appearance: Normal appearance. She is normal weight. She is not ill-appearing or diaphoretic.   HENT:      Head: Normocephalic and atraumatic.      Right Ear: External ear normal.      Left Ear: External ear normal.      Mouth/Throat:      Mouth: Mucous membranes are moist.      Pharynx: No oropharyngeal exudate or posterior oropharyngeal erythema.   Eyes:      General: No scleral icterus.     Extraocular Movements: Extraocular movements intact.      Conjunctiva/sclera: Conjunctivae normal.      Pupils: Pupils are equal, round, and reactive to light.   Cardiovascular:      Rate and Rhythm: Normal rate and regular rhythm.      Heart sounds: Normal heart sounds. No murmur heard.  Pulmonary:      Effort: Pulmonary effort is normal. No respiratory distress.      Breath sounds: Normal breath sounds. No stridor. No wheezing, rhonchi or rales.   Chest:      Chest wall: No tenderness.   Abdominal:      General: Bowel sounds are normal. There is no distension.      Palpations: Abdomen is soft. There is no mass.      Tenderness: There is abdominal tenderness (epigastrium, upper abdomen). There is no guarding or rebound.   Musculoskeletal:         General: No swelling. Normal range of motion.      Cervical back: Normal range of motion and neck supple. No rigidity. No muscular tenderness.       Right lower leg: No edema.      Left lower leg: No edema.   Lymphadenopathy:      Cervical: No cervical adenopathy.   Skin:     General: Skin is warm and dry.      Coloration: Skin is not jaundiced.      Findings: No rash.   Neurological:      General: No focal deficit present.      Mental Status: She is alert and oriented to person, place, and time. Mental status is at baseline.      Cranial Nerves: No cranial nerve deficit.   Psychiatric:         Mood and Affect: Mood normal.         Behavior: Behavior normal.         Thought Content: Thought content normal.         Judgment: Judgment normal.       Fluids    Intake/Output Summary (Last 24 hours) at 10/15/2022 1245  Last data filed at 10/15/2022 0900  Gross per 24 hour   Intake 980 ml   Output 200 ml   Net 780 ml       Laboratory  Recent Labs     10/14/22  1412 10/15/22  0417   WBC 9.3 13.5*   RBC 4.51 4.52   HEMOGLOBIN 13.7 13.8   HEMATOCRIT 41.3 42.5   MCV 91.6 94.0   MCH 30.4 30.5   MCHC 33.2* 32.5*   RDW 48.9 50.9*   PLATELETCT 333 294   MPV 9.7 9.9     Recent Labs     10/14/22  1412 10/15/22  0417   SODIUM 137 135   POTASSIUM 4.1 4.6   CHLORIDE 101 102   CO2 21 26   GLUCOSE 141* 194*   BUN 15 12   CREATININE 0.70 0.71   CALCIUM 9.5 7.8*             Recent Labs     10/14/22  1412   TRIGLYCERIDE 52   HDL 98   *       Imaging  CT-ABDOMEN-PELVIS WITH   Final Result      1.  Findings consistent with severe acute pancreatitis.   2.  No bowel obstruction or perforation.         US-RUQ   Final Result      Negative gallbladder/right upper quadrant ultrasound      [id number]    (Results Pending)        Assessment/Plan  * Severe acute pancreatitis- (present on admission)  Assessment & Plan  -Unclear cause at this time, no gallstones on right upper quadrant ultrasound, lipids within normal limits and patient does not drink alcohol.  She does have scleroderma, which raises the possibility of autoimmune pancreatitis.  -Awaiting IgG4 level for possible autoimmune  pancreatitis  -She was also taking some herbal supplements, which could have contributed.  -Continue aggressive IV fluid hydration.  Continue LR at 200 cc/h.    -Continue pain management with oxycodone and IV Dilaudid.    -Continue on bowel rest, clear liquid diet for now.  -She has a strong family history of bile duct issues, will obtain an MRCP.  -Trend lipase.  -Watch for signs of infection.  Trend WBC count, watch for fevers, as may need reimaging if so.    Scleroderma (HCC)- (present on admission)  Assessment & Plan  She has been recently started on methotrexate.    Holding methotrexate for now, usually takes Sunday night/Monday morning    Hypothyroid- (present on admission)  Assessment & Plan  -Continue home levothyroxine and Cytomel.  Check TSH.    Hyperlipidemia- (present on admission)  Assessment & Plan  - I will hold off on her simvastatin, as it is considered class I drug for drug-induced pancreatitis.       VTE prophylaxis: enoxaparin ppx

## 2022-10-15 NOTE — CARE PLAN
The patient is Stable - Low risk of patient condition declining or worsening    Shift Goals  Clinical Goals: pain control  Patient Goals: pain control  Family Goals: N/A    Progress made toward(s) clinical / shift goals:    Problem: Pain - Standard  Goal: Alleviation of pain or a reduction in pain to the patient’s comfort goal  Outcome: Progressing     Problem: Knowledge Deficit - Standard  Goal: Patient and family/care givers will demonstrate understanding of plan of care, disease process/condition, diagnostic tests and medications  Outcome: Progressing     Problem: Fluid Volume  Goal: Fluid volume balance will be maintained  Outcome: Progressing       Patient is not progressing towards the following goals:

## 2022-10-16 ENCOUNTER — APPOINTMENT (OUTPATIENT)
Dept: RADIOLOGY | Facility: MEDICAL CENTER | Age: 72
DRG: 438 | End: 2022-10-16
Attending: INTERNAL MEDICINE
Payer: MEDICARE

## 2022-10-16 PROBLEM — E87.1 HYPONATREMIA: Status: ACTIVE | Noted: 2022-10-16

## 2022-10-16 LAB
ANION GAP SERPL CALC-SCNC: 5 MMOL/L (ref 7–16)
BUN SERPL-MCNC: 14 MG/DL (ref 8–22)
CALCIUM SERPL-MCNC: 8.3 MG/DL (ref 8.4–10.2)
CHLORIDE SERPL-SCNC: 97 MMOL/L (ref 96–112)
CO2 SERPL-SCNC: 25 MMOL/L (ref 20–33)
CREAT SERPL-MCNC: 0.89 MG/DL (ref 0.5–1.4)
ERYTHROCYTE [DISTWIDTH] IN BLOOD BY AUTOMATED COUNT: 52.4 FL (ref 35.9–50)
GFR SERPLBLD CREATININE-BSD FMLA CKD-EPI: 69 ML/MIN/1.73 M 2
GLUCOSE SERPL-MCNC: 134 MG/DL (ref 65–99)
HCT VFR BLD AUTO: 42.7 % (ref 37–47)
HGB BLD-MCNC: 13.8 G/DL (ref 12–16)
IGG1 SER-MCNC: 460 MG/DL (ref 240–1118)
IGG2 SER-MCNC: 169 MG/DL (ref 124–549)
IGG3 SER-MCNC: 52 MG/DL (ref 21–134)
IGG4 SER-MCNC: 16 MG/DL (ref 1–123)
LIPASE SERPL-CCNC: 340 U/L (ref 7–58)
MCH RBC QN AUTO: 30.2 PG (ref 27–33)
MCHC RBC AUTO-ENTMCNC: 32.3 G/DL (ref 33.6–35)
MCV RBC AUTO: 93.4 FL (ref 81.4–97.8)
PLATELET # BLD AUTO: 254 K/UL (ref 164–446)
PMV BLD AUTO: 10.1 FL (ref 9–12.9)
POTASSIUM SERPL-SCNC: 4.8 MMOL/L (ref 3.6–5.5)
RBC # BLD AUTO: 4.57 M/UL (ref 4.2–5.4)
SODIUM SERPL-SCNC: 127 MMOL/L (ref 135–145)
WBC # BLD AUTO: 18.8 K/UL (ref 4.8–10.8)

## 2022-10-16 PROCEDURE — 83690 ASSAY OF LIPASE: CPT

## 2022-10-16 PROCEDURE — 99233 SBSQ HOSP IP/OBS HIGH 50: CPT | Performed by: INTERNAL MEDICINE

## 2022-10-16 PROCEDURE — 700111 HCHG RX REV CODE 636 W/ 250 OVERRIDE (IP): Performed by: INTERNAL MEDICINE

## 2022-10-16 PROCEDURE — 770006 HCHG ROOM/CARE - MED/SURG/GYN SEMI*

## 2022-10-16 PROCEDURE — 94760 N-INVAS EAR/PLS OXIMETRY 1: CPT

## 2022-10-16 PROCEDURE — A9270 NON-COVERED ITEM OR SERVICE: HCPCS | Performed by: INTERNAL MEDICINE

## 2022-10-16 PROCEDURE — 74181 MRI ABDOMEN W/O CONTRAST: CPT

## 2022-10-16 PROCEDURE — 80048 BASIC METABOLIC PNL TOTAL CA: CPT

## 2022-10-16 PROCEDURE — 700102 HCHG RX REV CODE 250 W/ 637 OVERRIDE(OP): Performed by: INTERNAL MEDICINE

## 2022-10-16 PROCEDURE — 700105 HCHG RX REV CODE 258: Performed by: INTERNAL MEDICINE

## 2022-10-16 PROCEDURE — 36415 COLL VENOUS BLD VENIPUNCTURE: CPT

## 2022-10-16 PROCEDURE — 85027 COMPLETE CBC AUTOMATED: CPT

## 2022-10-16 RX ORDER — FOLIC ACID 1 MG/1
1 TABLET ORAL
Status: DISCONTINUED | OUTPATIENT
Start: 2022-10-18 | End: 2022-10-21 | Stop reason: HOSPADM

## 2022-10-16 RX ORDER — LORAZEPAM 2 MG/ML
0.5 INJECTION INTRAMUSCULAR
Status: DISCONTINUED | OUTPATIENT
Start: 2022-10-16 | End: 2022-10-21 | Stop reason: HOSPADM

## 2022-10-16 RX ORDER — SODIUM CHLORIDE, SODIUM LACTATE, POTASSIUM CHLORIDE, CALCIUM CHLORIDE 600; 310; 30; 20 MG/100ML; MG/100ML; MG/100ML; MG/100ML
INJECTION, SOLUTION INTRAVENOUS CONTINUOUS
Status: DISCONTINUED | OUTPATIENT
Start: 2022-10-16 | End: 2022-10-17

## 2022-10-16 RX ADMIN — LIOTHYRONINE SODIUM 2.5 MCG: 5 TABLET ORAL at 05:47

## 2022-10-16 RX ADMIN — LORAZEPAM 0.5 MG: 2 INJECTION INTRAMUSCULAR; INTRAVENOUS at 14:49

## 2022-10-16 RX ADMIN — SENNOSIDES AND DOCUSATE SODIUM 2 TABLET: 50; 8.6 TABLET ORAL at 17:36

## 2022-10-16 RX ADMIN — METHOTREXATE 7.5 MG: 2.5 TABLET ORAL at 23:02

## 2022-10-16 RX ADMIN — SENNOSIDES AND DOCUSATE SODIUM 2 TABLET: 50; 8.6 TABLET ORAL at 05:47

## 2022-10-16 RX ADMIN — POLYETHYLENE GLYCOL 3350 1 PACKET: 17 POWDER, FOR SOLUTION ORAL at 05:47

## 2022-10-16 RX ADMIN — LEVOTHYROXINE SODIUM 50 MCG: 50 TABLET ORAL at 05:47

## 2022-10-16 RX ADMIN — SODIUM CHLORIDE, POTASSIUM CHLORIDE, SODIUM LACTATE AND CALCIUM CHLORIDE: 600; 310; 30; 20 INJECTION, SOLUTION INTRAVENOUS at 12:12

## 2022-10-16 ASSESSMENT — PAIN DESCRIPTION - PAIN TYPE
TYPE: ACUTE PAIN

## 2022-10-16 NOTE — CARE PLAN
The patient is Stable - Low risk of patient condition declining or worsening    Shift Goals  Clinical Goals: Patient will remain at stated comfort goal of 3/10 pain this shift.  Patient Goals: Patient will rest throughout shift  Family Goals: N/A    Progress made toward(s) clinical / shift goals:  Patient's pain is 2/10 and not requesting any medication for discomfort. Patient is tolerating her clear liquid diet; no pain or n/v. Patient care is clustered to promote rest.    Patient is not progressing towards the following goals:

## 2022-10-16 NOTE — PROGRESS NOTES
Hospital Medicine Daily Progress Note    Date of Service  10/16/2022    Chief Complaint  Upper abdominal pain    Hospital Course  Miranda Rose is a 72 y.o. female with anxiety, hyperlipidemia, hypothyroidism, chronic diarrhea, history of EBV, and recent diagnosis of scleroderma on weekly methotrexate due to folic acid, admitted 10/14/2022 with acute onset upper abdominal pain, radiating to the back, with associated nausea.  Bowel movements remained unchanged.  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  Evaluation showed significant elevated lipase of greater than 3000, triglycerides 52.  WBC count, electrolytes and renal function are normal.  LFTs are normal.  She had a negative right upper quadrant ultrasound.  CT of the abdomen showed findings consistent with severe acute pancreatitis, with edema seen about the pancreas extending into the mesenteric root and caroline hepatis, and extending into the anterior pararenal spaces bilaterally. She has acute pancreatitis, and was placed on aggressive IV fluid hydration, analgesics, and bowel rest.  She shared that she has strong family history of bile duct and gallbladder problems, prompting an MRCP.    Interval Problem Update  10/16/2022 - I reviewed the patient's chart today. Uneventful night. VSS. Afebrile. Saturating well on RA.  WBC 18,800.  Sodium 127.  Creatinine is within normal limits.  Calcium 8.3.  Lipase has significantly improved to 340.    > I have personally seen and examined the patient today.  Her abdominal pain has improved, although it accelerates when she moves.  She is tolerating clear liquids.  She rates her abdominal pain at 4 out of 10 in severity.  No nausea.  She feels bloated.  No nausea.  No bowel movement yet.    I have discussed this patient's plan of care and discharge plan at IDT rounds today with Case Management, Nursing, Nursing leadership, and other members of the  IDT team.    Consultants/Specialty  None    Code Status  Full Code    Disposition  Patient is not medically cleared for discharge.   Anticipate discharge to to home with close outpatient follow-up.  I have placed the appropriate orders for post-discharge needs.    Review of Systems  ROS     Pertinent positives/negatives as mentioned above.     A complete review of systems was personally done by me. All other systems were negative.       Physical Exam  Temp:  [36.5 °C (97.7 °F)-36.9 °C (98.4 °F)] 36.5 °C (97.7 °F)  Pulse:  [91-92] 92  Resp:  [14-18] 15  BP: (115-128)/(69-74) 128/74  SpO2:  [90 %-97 %] 97 %    Physical Exam  Vitals reviewed.   Constitutional:       General: She is not in acute distress.     Appearance: Normal appearance. She is normal weight. She is not ill-appearing or diaphoretic.   HENT:      Head: Normocephalic and atraumatic.      Right Ear: External ear normal.      Left Ear: External ear normal.      Mouth/Throat:      Mouth: Mucous membranes are moist.      Pharynx: No oropharyngeal exudate or posterior oropharyngeal erythema.   Eyes:      General: No scleral icterus.     Extraocular Movements: Extraocular movements intact.      Conjunctiva/sclera: Conjunctivae normal.      Pupils: Pupils are equal, round, and reactive to light.   Cardiovascular:      Rate and Rhythm: Normal rate and regular rhythm.      Heart sounds: Normal heart sounds. No murmur heard.  Pulmonary:      Effort: Pulmonary effort is normal. No respiratory distress.      Breath sounds: Normal breath sounds. No stridor. No wheezing, rhonchi or rales.   Chest:      Chest wall: No tenderness.   Abdominal:      General: Bowel sounds are normal. There is no distension.      Palpations: Abdomen is soft. There is no mass.      Tenderness: There is abdominal tenderness (improved epigastrium, upper abdomen). There is no guarding or rebound.   Musculoskeletal:         General: No swelling. Normal range of motion.      Cervical back:  Normal range of motion and neck supple. No rigidity. No muscular tenderness.      Right lower leg: No edema.      Left lower leg: No edema.   Lymphadenopathy:      Cervical: No cervical adenopathy.   Skin:     General: Skin is warm and dry.      Coloration: Skin is not jaundiced.      Findings: No rash.   Neurological:      General: No focal deficit present.      Mental Status: She is alert and oriented to person, place, and time. Mental status is at baseline.      Cranial Nerves: No cranial nerve deficit.   Psychiatric:         Mood and Affect: Mood normal.         Behavior: Behavior normal.         Thought Content: Thought content normal.         Judgment: Judgment normal.       Fluids    Intake/Output Summary (Last 24 hours) at 10/16/2022 1050  Last data filed at 10/16/2022 0900  Gross per 24 hour   Intake 2320 ml   Output 1050 ml   Net 1270 ml       Laboratory  Recent Labs     10/14/22  1412 10/15/22  0417 10/16/22  0237   WBC 9.3 13.5* 18.8*   RBC 4.51 4.52 4.57   HEMOGLOBIN 13.7 13.8 13.8   HEMATOCRIT 41.3 42.5 42.7   MCV 91.6 94.0 93.4   MCH 30.4 30.5 30.2   MCHC 33.2* 32.5* 32.3*   RDW 48.9 50.9* 52.4*   PLATELETCT 333 294 254   MPV 9.7 9.9 10.1     Recent Labs     10/14/22  1412 10/15/22  0417 10/16/22  0237   SODIUM 137 135 127*   POTASSIUM 4.1 4.6 4.8   CHLORIDE 101 102 97   CO2 21 26 25   GLUCOSE 141* 194* 134*   BUN 15 12 14   CREATININE 0.70 0.71 0.89   CALCIUM 9.5 7.8* 8.3*             Recent Labs     10/14/22  1412   TRIGLYCERIDE 52   HDL 98   *       Imaging  CT-ABDOMEN-PELVIS WITH   Final Result      1.  Findings consistent with severe acute pancreatitis.   2.  No bowel obstruction or perforation.         US-RUQ   Final Result      Negative gallbladder/right upper quadrant ultrasound      [id number]    (Results Pending)        Assessment/Plan  * Severe acute pancreatitis- (present on admission)  Assessment & Plan  -Unclear cause at this time, no gallstones on right upper quadrant  ultrasound, lipids within normal limits and patient does not drink alcohol.  She does have scleroderma, which raises the possibility of autoimmune pancreatitis.  -Awaiting IgG4 level for possible autoimmune pancreatitis  -She was also taking some herbal supplements, which could have contributed.  -Lipase has improved significantly.  Decrease rate of IVF to 100 cc/h.  -Continue pain management with oxycodone and IV Dilaudid.    -Continue clear liquid diet.   -Continue on clear liquid diet for now.  Hold off on advancing diet until tomorrow as long as abdominal pain continues to improve, lipase remains low, and tolerating clear liquids.  -Awaiting MRCP.  She has a strong family history of bile duct issues.  -Trend lipase.  -Watch for signs of infection.  Trend WBC count, watch for fevers, as may need reimaging if so.    Hyponatremia- (present on admission)  Assessment & Plan  - Suspect SIADH.  -As lipase has trended down, decrease rate of IVF to 100 cc/h.  -Trend BMP.    Scleroderma (HCC)- (present on admission)  Assessment & Plan  She has been recently started on methotrexate.    Holding methotrexate for now, usually takes Sunday night/Monday morning    Hypothyroid- (present on admission)  Assessment & Plan  -Continue home levothyroxine and Cytomel.  TSH within normal limits.    Hyperlipidemia- (present on admission)  Assessment & Plan  - continue to hold off on her simvastatin for now, as it is considered class I drug for drug-induced pancreatitis.       VTE prophylaxis: enoxaparin ppx

## 2022-10-16 NOTE — PROGRESS NOTES
Pt is awake in bed. VSS. Pt c/o 2/10 abdominal pain. Abdomen is semi-firm and bowel sounds are hypoactive. Pt still states she is passing gas. RN discussed POC with pt. All questions answered. Fall precautions in place.

## 2022-10-16 NOTE — ASSESSMENT & PLAN NOTE
- Suspect SIADH.  Improved with holding off on IV fluids, and diuretics.  Sodium has now normalized.  -Continue to trend BMP.  Continue Lasix.

## 2022-10-16 NOTE — CARE PLAN
The patient is Stable - Low risk of patient condition declining or worsening    Shift Goals  Clinical Goals: pt will remain at stated comfort goal of 3/10 pain this shift.  Patient Goals: sleep comfortably  Family Goals: N/A    Progress made toward(s) clinical / shift goals:      Non-pharmacological pain management has allowed pt to remain at comfort goal.    Problem: Pain - Standard  Goal: Alleviation of pain or a reduction in pain to the patient’s comfort goal  Outcome: Progressing     Problem: Knowledge Deficit - Standard  Goal: Patient and family/care givers will demonstrate understanding of plan of care, disease process/condition, diagnostic tests and medications  Outcome: Progressing     Problem: Fluid Volume  Goal: Fluid volume balance will be maintained  Outcome: Progressing       Patient is not progressing towards the following goals:

## 2022-10-17 LAB
ANION GAP SERPL CALC-SCNC: 9 MMOL/L (ref 7–16)
BUN SERPL-MCNC: 16 MG/DL (ref 8–22)
CALCIUM SERPL-MCNC: 8.3 MG/DL (ref 8.4–10.2)
CHLORIDE SERPL-SCNC: 97 MMOL/L (ref 96–112)
CO2 SERPL-SCNC: 25 MMOL/L (ref 20–33)
CREAT SERPL-MCNC: 0.86 MG/DL (ref 0.5–1.4)
ERYTHROCYTE [DISTWIDTH] IN BLOOD BY AUTOMATED COUNT: 51.1 FL (ref 35.9–50)
GFR SERPLBLD CREATININE-BSD FMLA CKD-EPI: 71 ML/MIN/1.73 M 2
GLUCOSE SERPL-MCNC: 116 MG/DL (ref 65–99)
HCT VFR BLD AUTO: 38.5 % (ref 37–47)
HGB BLD-MCNC: 12.6 G/DL (ref 12–16)
LIPASE SERPL-CCNC: 67 U/L (ref 7–58)
MCH RBC QN AUTO: 30.4 PG (ref 27–33)
MCHC RBC AUTO-ENTMCNC: 32.7 G/DL (ref 33.6–35)
MCV RBC AUTO: 93 FL (ref 81.4–97.8)
PLATELET # BLD AUTO: 241 K/UL (ref 164–446)
PMV BLD AUTO: 10.4 FL (ref 9–12.9)
POTASSIUM SERPL-SCNC: 5.1 MMOL/L (ref 3.6–5.5)
RBC # BLD AUTO: 4.14 M/UL (ref 4.2–5.4)
SODIUM SERPL-SCNC: 131 MMOL/L (ref 135–145)
WBC # BLD AUTO: 18.1 K/UL (ref 4.8–10.8)

## 2022-10-17 PROCEDURE — 770006 HCHG ROOM/CARE - MED/SURG/GYN SEMI*

## 2022-10-17 PROCEDURE — 85027 COMPLETE CBC AUTOMATED: CPT

## 2022-10-17 PROCEDURE — 99233 SBSQ HOSP IP/OBS HIGH 50: CPT | Performed by: INTERNAL MEDICINE

## 2022-10-17 PROCEDURE — 700111 HCHG RX REV CODE 636 W/ 250 OVERRIDE (IP): Performed by: INTERNAL MEDICINE

## 2022-10-17 PROCEDURE — 97165 OT EVAL LOW COMPLEX 30 MIN: CPT

## 2022-10-17 PROCEDURE — 80048 BASIC METABOLIC PNL TOTAL CA: CPT

## 2022-10-17 PROCEDURE — 700102 HCHG RX REV CODE 250 W/ 637 OVERRIDE(OP): Performed by: INTERNAL MEDICINE

## 2022-10-17 PROCEDURE — 94760 N-INVAS EAR/PLS OXIMETRY 1: CPT

## 2022-10-17 PROCEDURE — 36415 COLL VENOUS BLD VENIPUNCTURE: CPT

## 2022-10-17 PROCEDURE — A9270 NON-COVERED ITEM OR SERVICE: HCPCS | Performed by: INTERNAL MEDICINE

## 2022-10-17 PROCEDURE — 83690 ASSAY OF LIPASE: CPT

## 2022-10-17 RX ORDER — FUROSEMIDE 10 MG/ML
20 INJECTION INTRAMUSCULAR; INTRAVENOUS
Status: DISCONTINUED | OUTPATIENT
Start: 2022-10-17 | End: 2022-10-18

## 2022-10-17 RX ADMIN — SENNOSIDES AND DOCUSATE SODIUM 2 TABLET: 50; 8.6 TABLET ORAL at 04:55

## 2022-10-17 RX ADMIN — METHOTREXATE 7.5 MG: 2.5 TABLET ORAL at 08:46

## 2022-10-17 RX ADMIN — SENNOSIDES AND DOCUSATE SODIUM 2 TABLET: 50; 8.6 TABLET ORAL at 17:38

## 2022-10-17 RX ADMIN — LEVOTHYROXINE SODIUM 50 MCG: 50 TABLET ORAL at 04:56

## 2022-10-17 RX ADMIN — FUROSEMIDE 20 MG: 10 INJECTION, SOLUTION INTRAMUSCULAR; INTRAVENOUS at 10:09

## 2022-10-17 RX ADMIN — LIOTHYRONINE SODIUM 2.5 MCG: 5 TABLET ORAL at 04:56

## 2022-10-17 ASSESSMENT — ACTIVITIES OF DAILY LIVING (ADL): TOILETING: INDEPENDENT

## 2022-10-17 ASSESSMENT — PAIN DESCRIPTION - PAIN TYPE
TYPE: ACUTE PAIN
TYPE: ACUTE PAIN

## 2022-10-17 ASSESSMENT — COGNITIVE AND FUNCTIONAL STATUS - GENERAL
DAILY ACTIVITIY SCORE: 24
SUGGESTED CMS G CODE MODIFIER DAILY ACTIVITY: CH

## 2022-10-17 ASSESSMENT — GAIT ASSESSMENTS: DISTANCE (FEET): 30

## 2022-10-17 NOTE — PROGRESS NOTES
[facility] Medicine Daily Progress Note    Date of Service  10/17/2022    Chief Complaint  Upper abdominal pain    Hospital Course  Miranda Rose is a 72 y.o. female with anxiety, hyperlipidemia, hypothyroidism, chronic diarrhea, history of EBV, and recent diagnosis of scleroderma on weekly methotrexate due to folic acid, admitted 10/14/2022 with acute onset upper abdominal pain, radiating to the back, with associated nausea.  Bowel movements remained unchanged.  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  Evaluation showed significant elevated lipase of greater than 3000, triglycerides 52.  WBC count, electrolytes and renal function are normal.  LFTs are normal.  She had a negative right upper quadrant ultrasound.  CT of the abdomen showed findings consistent with severe acute pancreatitis, with edema seen about the pancreas extending into the mesenteric root and caroline hepatis, and extending into the anterior pararenal spaces bilaterally. She has acute pancreatitis, and was placed on aggressive IV fluid hydration, analgesics, and bowel rest.  She clinically improved.  Her lipase has significantly improved.  She shared that she has strong family history of bile duct and gallbladder problems, prompting an MRCP.    Interval Problem Update  10/17/2022 - I reviewed the patient's chart. There were no significant overnight events. Remains hemodynamically stable and afebrile. Stable on 2L O2 NC.  WBC 18,100.  Lipase further went down to 67.  Sodium improved to 131.  MRCP showed no choledocholithiasis or biliary dilatation, no cholelithiasis with moderately distended gallbladder.  She still has severe interstitial edematous pancreatitis with no drainable organized pancreatic or peripancreatic fluid collection, with new moderate right and small left pleural effusions related to pancreatitis, and moderate volume ascites, retroperitoneal edema, and mild  anasarca also related to pancreatitis.  She is urinating well.  IgG4 antibodies level is normal.    > I have personally seen and examined the patient today.  She is seen walking around the unit with the aid, although she feels short of breath and winded.  Her oxygen saturation is maintaining adequate levels.  Pain is about the same as yesterday, tolerating clear liquid diet.  No edema.  Still no bowel movement.    I have discussed this patient's plan of care and discharge plan at IDT rounds today with Case Management, Nursing, Nursing leadership, and other members of the IDT team.    Consultants/Specialty  None    Code Status  Full Code    Disposition  Patient is not medically cleared for discharge.   Anticipate discharge to to home with close outpatient follow-up.  I have placed the appropriate orders for post-discharge needs.    Review of Systems  ROS     Pertinent positives/negatives as mentioned above.     A complete review of systems was personally done by me. All other systems were negative.       Physical Exam  Temp:  [36.5 °C (97.7 °F)-36.8 °C (98.2 °F)] 36.8 °C (98.2 °F)  Pulse:  [] 93  Resp:  [14-15] 15  BP: (127-137)/(77-84) 130/84  SpO2:  [87 %-95 %] 93 %    Physical Exam  Vitals reviewed.   Constitutional:       General: She is not in acute distress.     Appearance: Normal appearance. She is normal weight. She is not ill-appearing or diaphoretic.   HENT:      Head: Normocephalic and atraumatic.      Right Ear: External ear normal.      Left Ear: External ear normal.      Mouth/Throat:      Mouth: Mucous membranes are moist.      Pharynx: No oropharyngeal exudate or posterior oropharyngeal erythema.   Eyes:      General: No scleral icterus.     Extraocular Movements: Extraocular movements intact.      Conjunctiva/sclera: Conjunctivae normal.      Pupils: Pupils are equal, round, and reactive to light.   Cardiovascular:      Rate and Rhythm: Normal rate and regular rhythm.      Heart sounds: Normal  heart sounds. No murmur heard.  Pulmonary:      Effort: Pulmonary effort is normal. No respiratory distress.      Breath sounds: No stridor. No wheezing, rhonchi or rales.      Comments: Diminished air entry B/L bases  Chest:      Chest wall: No tenderness.   Abdominal:      General: There is no distension.      Palpations: Abdomen is soft. There is no mass.      Tenderness: There is abdominal tenderness (improved epigastrium, upper abdomen). There is no guarding or rebound.      Comments: Hypoactive bowel sounds   Musculoskeletal:         General: No swelling. Normal range of motion.      Cervical back: Normal range of motion and neck supple. No rigidity. No muscular tenderness.      Right lower leg: No edema.      Left lower leg: No edema.   Lymphadenopathy:      Cervical: No cervical adenopathy.   Skin:     General: Skin is warm and dry.      Coloration: Skin is not jaundiced.      Findings: No rash.   Neurological:      General: No focal deficit present.      Mental Status: She is alert and oriented to person, place, and time. Mental status is at baseline.      Cranial Nerves: No cranial nerve deficit.   Psychiatric:         Mood and Affect: Mood normal.         Behavior: Behavior normal.         Thought Content: Thought content normal.         Judgment: Judgment normal.       Fluids    Intake/Output Summary (Last 24 hours) at 10/17/2022 1015  Last data filed at 10/17/2022 0913  Gross per 24 hour   Intake 1569 ml   Output 400 ml   Net 1169 ml       Laboratory  Recent Labs     10/15/22  0417 10/16/22  0237 10/17/22  0218   WBC 13.5* 18.8* 18.1*   RBC 4.52 4.57 4.14*   HEMOGLOBIN 13.8 13.8 12.6   HEMATOCRIT 42.5 42.7 38.5   MCV 94.0 93.4 93.0   MCH 30.5 30.2 30.4   MCHC 32.5* 32.3* 32.7*   RDW 50.9* 52.4* 51.1*   PLATELETCT 294 254 241   MPV 9.9 10.1 10.4     Recent Labs     10/15/22  0417 10/16/22  0237 10/17/22  0218   SODIUM 135 127* 131*   POTASSIUM 4.6 4.8 5.1   CHLORIDE 102 97 97   CO2 26 25 25   GLUCOSE  194* 134* 116*   BUN 12 14 16   CREATININE 0.71 0.89 0.86   CALCIUM 7.8* 8.3* 8.3*             Recent Labs     10/14/22  1412   TRIGLYCERIDE 52   HDL 98   *       Imaging  [id number]   Final Result      1. No choledocholithiasis or biliary dilatation.   2. Moderately distended gallbladder. No cholelithiasis.   3. Severe interstitial edematous pancreatitis, progressive since prior study. No drainable organized pancreatic or peripancreatic fluid collections.   4. New moderate right and small left pleural effusions related to pancreatitis. Associated atelectasis.   5. Moderate volume ascites, retroperitoneal edema and mild anasarca, also related to pancreatitis.         CT-ABDOMEN-PELVIS WITH   Final Result      1.  Findings consistent with severe acute pancreatitis.   2.  No bowel obstruction or perforation.         US-RUQ   Final Result      Negative gallbladder/right upper quadrant ultrasound           Assessment/Plan  * Severe acute pancreatitis- (present on admission)  Assessment & Plan  -Unclear cause at this time, no gallstones on right upper quadrant ultrasound, lipids within normal limits and patient does not drink alcohol.  She does have scleroderma, which raises the possibility of autoimmune pancreatitis.  MRCP showed no cholecystodocholithiasis or biliary dilatation. IgG4 level is normal.   -She was also taking some herbal supplements, which could have contributed.  -Lipase has improved significantly, but still with abdominal pain.  No showing signs of volume overload or pleural effusion, shortness of breath although not hypoxic.   -Stop IV fluids.  Start IV Lasix 20 mg daily.  -Advance diet to full liquid diet, and see how she tolerates it.  Repeat lipase to make sure it does not go back up again with advancing diet.  -Continue pain management with oxycodone and IV Dilaudid.    -So far no signs of infection, but continue to monitor.  No signs of pancreatic fluid collection trend on the MRI.   Trend WBC count, watch for fevers, as may need reimaging if so.    Hyponatremia- (present on admission)  Assessment & Plan  - Suspect SIADH.  -As also showing signs of volume overload, stop IV fluids.  Start IV Lasix 20 mg daily.  -Trend BMP.    Scleroderma (HCC)- (present on admission)  Assessment & Plan  She has been recently started on methotrexate.    She is back on home methotrexate, takes every 7 days on Sunday night and Monday morning    Hypothyroid- (present on admission)  Assessment & Plan  -Continue home levothyroxine and Cytomel.  TSH within normal limits.    Hyperlipidemia- (present on admission)  Assessment & Plan  - continue to hold off on her simvastatin for now, as it is considered class I drug for drug-induced pancreatitis.       VTE prophylaxis: enoxaparin ppx

## 2022-10-17 NOTE — CARE PLAN
The patient is Stable - Low risk of patient condition declining or worsening    Shift Goals  Clinical Goals: Free from falls or injuries, Rest and sleep at least 4 hours, Pain Controlled 3/10  Patient Goals: Free from falls or injuries, Rest and sleep at least 4 hours, Pain Controlled 3/10  Family Goals: N/A    Progress made toward(s) clinical / shift goals: No falls or injuries, rested and slept at least 4 hours, pain controlled    Patient is not progressing towards the following goals:

## 2022-10-17 NOTE — CARE PLAN
The patient is Stable - Low risk of patient condition declining or worsening    Shift Goals  Clinical Goals: tolerating current diet this shift  Patient Goals: rest comfortably  Family Goals: N/A    Progress made toward(s) clinical / shift goals:  Patient's pain is 2/10. and not requesting any medication for discomfort. Patient is tolerating her full liquid diet; no pain or nausea/vomiting.   Patient is not progressing towards the following goals:      Problem: Pain - Standard  Goal: Alleviation of pain or a reduction in pain to the patient’s comfort goal  Outcome: Progressing     Problem: Gastrointestinal Irritability  Goal: Nausea and vomiting will be absent or improve  Outcome: Progressing

## 2022-10-17 NOTE — THERAPY
Physical Therapy DC Note    Patient Name: Miranda Rose  Age:  72 y.o., Sex:  female  Medical Record #: [id number]  Today's Date: 10/17/2022           10/17/22 1401   Initial Contact Note    Initial Contact Note Order Received and Verified. Physical Therapy Evaluation NOT Completed Because Patient Does Not Require Acute Physical Therapy at this Time.   Interdisciplinary Plan of Care Collaboration   IDT Collaboration with  Nursing   Collaboration Comments PT screen completed, pt is refusing PT, states is ambulatory without AD and has no skilled PT needs. Will DC PT per pt request.

## 2022-10-17 NOTE — THERAPY
Occupational Therapy   Initial Evaluation     Patient Name: Miranda Rose  Age:  72 y.o., Sex:  female  Medical Record #: [id number]  Today's Date: 10/17/2022          Assessment  Patient is 72 y.o. female with a diagnosis of Severe Acute Pancreatitis.  Additional factors influencing patient status / progress: Abdominal pain/discomfort, RUE edema.  Patient and  lives part of the year in the [facility] and [place].  [place] home is 2 story w/ guest quarters on 2nd level, patient primarily residels on ground level.  PLOF Indep w/ ADL's/transfers.  Therapist reviewed environmental/safety awareness, fall precautions, ADL's/transfers.    Plan    Recommend Occupational Therapy for Evaluation only.    DC Equipment Recommendations: None  Discharge Recommendations: Anticipate that the patient will have no further occupational therapy needs after discharge from the hospital     Subjective    Patient was alert, oriented and cooperative w/ tx.     Objective       10/17/22 0911   Prior Living Situation   Prior Services None;Home-Independent   Housing / Facility 2 Story House   Steps Into Home 4   Steps In Home 12   Elevator No   Bathroom Set up Walk In Shower;Shower Glass Doors  (Built in bench)   Equipment Owned None   Lives with - Patient's Self Care Capacity Spouse   Comments Patient and  live a 2 story home, living quarters/bathroom/kitchen on ground level.   available to assist as needed.   Prior Level of ADL Function   Self Feeding Independent   Grooming / Hygiene Independent   Bathing Independent   Dressing Independent   Toileting Independent   Prior Level of IADL Function   Medication Management Independent   Laundry Independent   Kitchen Mobility Independent   Finances Independent   Home Management Independent   Shopping Independent   Prior Level Of Mobility Independent Without Device in Community;Independent Without Device in Home;Independent With Steps in Community;Independent With Steps in  Home   Driving / Transportation Driving Independent   History of Falls   History of Falls No   Vitals   Pulse Oximetry 93 %   O2 Delivery Device None - Room Air   Pain   Intervention Repositioned   Pain 0 - 10 Group   Location Abdomen   Location Orientation Right;Left;Upper   Description Sore   Therapist Pain Assessment 4;Post Activity Pain Same as Prior to Activity;Nurse Notified   Cognition    Cognition / Consciousness WDL   Active ROM Upper Body   Active ROM Upper Body  WDL   Dominant Hand Right   Strength Upper Body   Upper Body Strength  WDL   Upper Body Muscle Tone   Upper Body Muscle Tone  WDL   Coordination Upper Body   Coordination WDL   Balance Assessment   Sitting Balance (Static) Good   Sitting Balance (Dynamic) Good   Standing Balance (Static) Good   Standing Balance (Dynamic) Fair +   Weight Shift Sitting Good   Weight Shift Standing Good   Bed Mobility    Supine to Sit Modified Independent   Sit to Supine Modified Independent   Scooting Modified Independent   ADL Assessment   Eating Independent   Upper Body Dressing Independent   Lower Body Dressing Modified Independent   Toileting Modified Independent   How much help from another person does the patient currently need...   Putting on and taking off regular lower body clothing? 4   Bathing (including washing, rinsing, and drying)? 4   Toileting, which includes using a toilet, bedpan, or urinal? 4   Putting on and taking off regular upper body clothing? 4   Taking care of personal grooming such as brushing teeth? 4   Eating meals? 4   6 Clicks Daily Activity Score 24   Functional Mobility   Sit to Stand Modified Independent   Bed, Chair, Wheelchair Transfer Modified Independent   Toilet Transfers Modified Independent   Transfer Method Stand Step   Mobility SBA functional mobility w/out device, EOB<>commode<>hallway   Distance (Feet) 30   # of Times Distance was Traveled 2   Edema / Skin Assessment   Edema / Skin  X   Right Upper Extremity Edema Not  Tested   Activity Tolerance   Sitting Edge of Bed 15   Standing 5x2   Education Group   Education Provided Transfers;Role of Occupational Therapist;Activities of Daily Living;Use of Call Light   Role of Occupational Therapist Patient Response Patient;Acceptance;Explanation;Verbal Demonstration   Transfers Patient Response Patient;Acceptance;Explanation;Demonstration;Verbal Demonstration;Action Demonstration   ADL Patient Response Patient;Acceptance;Explanation;Demonstration;Verbal Demonstration;Action Demonstration   Use of Call Light Patient Response Patient;Eager;Acceptance;Explanation;Demonstration;Verbal Demonstration;Action Demonstration   Problem List   Problem List   (Edema RUE, upper abdominal pain/discomfort)   Anticipated Discharge Equipment and Recommendations   DC Equipment Recommendations None   Discharge Recommendations Anticipate that the patient will have no further occupational therapy needs after discharge from the hospital

## 2022-10-17 NOTE — PROGRESS NOTES
Received bedside patient report from MAGGY Beck. Patient resting comfortably in bed, no complaints at this time. Safety precautions in place. Will continue to monitor.

## 2022-10-18 ENCOUNTER — APPOINTMENT (OUTPATIENT)
Dept: RADIOLOGY | Facility: MEDICAL CENTER | Age: 72
DRG: 438 | End: 2022-10-18
Attending: INTERNAL MEDICINE
Payer: MEDICARE

## 2022-10-18 PROBLEM — J96.01 ACUTE RESPIRATORY FAILURE WITH HYPOXEMIA (HCC): Status: ACTIVE | Noted: 2022-10-18

## 2022-10-18 LAB
ALBUMIN SERPL BCP-MCNC: 2.3 G/DL (ref 3.2–4.9)
ALBUMIN/GLOB SERPL: 0.7 G/DL
ALP SERPL-CCNC: 76 U/L (ref 30–99)
ALT SERPL-CCNC: 8 U/L (ref 2–50)
ANION GAP SERPL CALC-SCNC: 9 MMOL/L (ref 7–16)
AST SERPL-CCNC: 12 U/L (ref 12–45)
BILIRUB SERPL-MCNC: 0.2 MG/DL (ref 0.1–1.5)
BUN SERPL-MCNC: 16 MG/DL (ref 8–22)
CALCIUM SERPL-MCNC: 8.7 MG/DL (ref 8.4–10.2)
CHLORIDE SERPL-SCNC: 98 MMOL/L (ref 96–112)
CO2 SERPL-SCNC: 27 MMOL/L (ref 20–33)
CREAT SERPL-MCNC: 0.73 MG/DL (ref 0.5–1.4)
ERYTHROCYTE [DISTWIDTH] IN BLOOD BY AUTOMATED COUNT: 51.4 FL (ref 35.9–50)
GFR SERPLBLD CREATININE-BSD FMLA CKD-EPI: 87 ML/MIN/1.73 M 2
GLOBULIN SER CALC-MCNC: 3.1 G/DL (ref 1.9–3.5)
GLUCOSE SERPL-MCNC: 123 MG/DL (ref 65–99)
HCT VFR BLD AUTO: 37.6 % (ref 37–47)
HGB BLD-MCNC: 12.2 G/DL (ref 12–16)
LIPASE SERPL-CCNC: 21 U/L (ref 7–58)
MCH RBC QN AUTO: 30.2 PG (ref 27–33)
MCHC RBC AUTO-ENTMCNC: 32.4 G/DL (ref 33.6–35)
MCV RBC AUTO: 93.1 FL (ref 81.4–97.8)
NT-PROBNP SERPL IA-MCNC: 65 PG/ML (ref 0–125)
PLATELET # BLD AUTO: 288 K/UL (ref 164–446)
PMV BLD AUTO: 10.3 FL (ref 9–12.9)
POTASSIUM SERPL-SCNC: 5 MMOL/L (ref 3.6–5.5)
PROT SERPL-MCNC: 5.4 G/DL (ref 6–8.2)
RBC # BLD AUTO: 4.04 M/UL (ref 4.2–5.4)
SODIUM SERPL-SCNC: 134 MMOL/L (ref 135–145)
WBC # BLD AUTO: 16.6 K/UL (ref 4.8–10.8)

## 2022-10-18 PROCEDURE — A9270 NON-COVERED ITEM OR SERVICE: HCPCS | Performed by: INTERNAL MEDICINE

## 2022-10-18 PROCEDURE — 99233 SBSQ HOSP IP/OBS HIGH 50: CPT | Performed by: INTERNAL MEDICINE

## 2022-10-18 PROCEDURE — 71046 X-RAY EXAM CHEST 2 VIEWS: CPT

## 2022-10-18 PROCEDURE — 700102 HCHG RX REV CODE 250 W/ 637 OVERRIDE(OP): Performed by: INTERNAL MEDICINE

## 2022-10-18 PROCEDURE — 770006 HCHG ROOM/CARE - MED/SURG/GYN SEMI*

## 2022-10-18 PROCEDURE — 36415 COLL VENOUS BLD VENIPUNCTURE: CPT

## 2022-10-18 PROCEDURE — 80053 COMPREHEN METABOLIC PANEL: CPT

## 2022-10-18 PROCEDURE — 83690 ASSAY OF LIPASE: CPT

## 2022-10-18 PROCEDURE — 85027 COMPLETE CBC AUTOMATED: CPT

## 2022-10-18 PROCEDURE — 700111 HCHG RX REV CODE 636 W/ 250 OVERRIDE (IP): Performed by: INTERNAL MEDICINE

## 2022-10-18 PROCEDURE — 83880 ASSAY OF NATRIURETIC PEPTIDE: CPT

## 2022-10-18 RX ORDER — FUROSEMIDE 10 MG/ML
20 INJECTION INTRAMUSCULAR; INTRAVENOUS
Status: DISCONTINUED | OUTPATIENT
Start: 2022-10-18 | End: 2022-10-19

## 2022-10-18 RX ADMIN — SENNOSIDES AND DOCUSATE SODIUM 2 TABLET: 50; 8.6 TABLET ORAL at 16:10

## 2022-10-18 RX ADMIN — LEVOTHYROXINE SODIUM 50 MCG: 50 TABLET ORAL at 05:21

## 2022-10-18 RX ADMIN — SENNOSIDES AND DOCUSATE SODIUM 2 TABLET: 50; 8.6 TABLET ORAL at 05:21

## 2022-10-18 RX ADMIN — FOLIC ACID 1 MG: 1 TABLET ORAL at 05:21

## 2022-10-18 RX ADMIN — LIOTHYRONINE SODIUM 2.5 MCG: 5 TABLET ORAL at 05:21

## 2022-10-18 RX ADMIN — FUROSEMIDE 20 MG: 10 INJECTION, SOLUTION INTRAMUSCULAR; INTRAVENOUS at 16:11

## 2022-10-18 RX ADMIN — FUROSEMIDE 20 MG: 10 INJECTION, SOLUTION INTRAMUSCULAR; INTRAVENOUS at 05:21

## 2022-10-18 ASSESSMENT — PAIN DESCRIPTION - PAIN TYPE
TYPE: ACUTE PAIN
TYPE: ACUTE PAIN

## 2022-10-18 ASSESSMENT — PATIENT HEALTH QUESTIONNAIRE - PHQ9
1. LITTLE INTEREST OR PLEASURE IN DOING THINGS: NOT AT ALL
SUM OF ALL RESPONSES TO PHQ9 QUESTIONS 1 AND 2: 0
1. LITTLE INTEREST OR PLEASURE IN DOING THINGS: NOT AT ALL
2. FEELING DOWN, DEPRESSED, IRRITABLE, OR HOPELESS: NOT AT ALL
2. FEELING DOWN, DEPRESSED, IRRITABLE, OR HOPELESS: NOT AT ALL
SUM OF ALL RESPONSES TO PHQ9 QUESTIONS 1 AND 2: 0

## 2022-10-18 NOTE — PROGRESS NOTES
Pt is supine when received. Complains of mild pain, medication given per scale. Verbalizes needs well and demonstrates use of call bell. Call bell in reach    Chart check completed

## 2022-10-18 NOTE — CARE PLAN
The patient is Stable - Low risk of patient condition declining or worsening    Shift Goals  Clinical Goals: Pain and nausea control  Patient Goals: shower  Family Goals: N/A    Progress made toward(s) clinical / shift goals:    Problem: Pain - Standard  Goal: Alleviation of pain or a reduction in pain to the patient’s comfort goal  Outcome: Progressing     Problem: Knowledge Deficit - Standard  Goal: Patient and family/care givers will demonstrate understanding of plan of care, disease process/condition, diagnostic tests and medications  Outcome: Progressing     Problem: Fluid Volume  Goal: Fluid volume balance will be maintained  Outcome: Progressing     Problem: Gastrointestinal Irritability  Goal: Nausea and vomiting will be absent or improve  Outcome: Progressing       Patient is not progressing towards the following goals:

## 2022-10-18 NOTE — DISCHARGE PLANNING
Case Management Discharge Planning    Admission Date: 10/14/2022  GMLOS: 3.1  ALOS: 4    6-Clicks ADL Score: 24  6-Clicks Mobility Score: 21      Anticipated Discharge Dispo: Discharge Disposition: Discharged to home/self care (01)    DME Needed: No    Action(s) Taken: Updated Provider/Nurse on Discharge Plan    No anticipated DC needs. RN CM will continue to follow. Anticipate patient will be weaned from O2.    Escalations Completed: None    Medically Clear: No    Next Steps: Medical clearance    Barriers to Discharge: Medical clearance

## 2022-10-18 NOTE — PROGRESS NOTES
[facility] Medicine Daily Progress Note    Date of Service  10/18/2022    Chief Complaint  Upper abdominal pain    Hospital Course  Miranda Rose is a 72 y.o. female with anxiety, hyperlipidemia, hypothyroidism, chronic diarrhea, history of EBV, and recent diagnosis of scleroderma on weekly methotrexate due to folic acid, admitted 10/14/2022 with acute onset upper abdominal pain, radiating to the back, with associated nausea.  Bowel movements remained unchanged.  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  Evaluation showed significant elevated lipase of greater than 3000, triglycerides 52.  WBC count, electrolytes and renal function are normal.  LFTs are normal.  She had a negative right upper quadrant ultrasound.  CT of the abdomen showed findings consistent with severe acute pancreatitis, with edema seen about the pancreas extending into the mesenteric root and caroline hepatis, and extending into the anterior pararenal spaces bilaterally. She has acute pancreatitis, and was placed on aggressive IV fluid hydration, analgesics, and bowel rest.  She clinically improved.  Her lipase has significantly improved.  She shared that she has strong family history of bile duct and gallbladder problems, prompting an MRCP which showed no choledocholithiasis or biliary dilatation, no cholelithiasis with moderately distended gallbladder, with severe interstitial edematous pancreatitis with no drainable organized pancreatic or peripancreatic fluid collection, with new moderate right and small left pleural effusions related to pancreatitis, and moderate volume ascites, retroperitoneal edema, and mild anasarca also related to pancreatitis.  IgG4 antibodies level is normal.  Her diet was advanced to full liquids.  She started to show signs of volume overload, and her IV fluids were discontinued and she is started on IV Lasix.  She was evaluated by  PT/OT.    Interval Problem Update  10/18/2022 - I reviewed the patient's chart today. Uneventful night. VSS. Afebrile. On 2L O2 NC.  WBC trending down, 16,600 today.  Sodium improved to 134.  Lipase now normal 21.  LFTs are normal.  She is urinating well.  She had bowel movements.    > I have personally seen and examined the patient today.  She is tolerating oral full liquid diet.  She does feel short of breath, and puffy.  Has trace ankle edema.  She gets winded with exertion.  No nausea, vomiting.  She has occasional abdominal pain.  No fevers or chills.      I have discussed this patient's plan of care and discharge plan at IDT rounds today with Case Management, Nursing, Nursing leadership, and other members of the IDT team.    Consultants/Specialty  None    Code Status  Full Code    Disposition  Patient is not medically cleared for discharge.   Anticipate discharge to to home with close outpatient follow-up.  I have placed the appropriate orders for post-discharge needs.    Review of Systems  ROS     Pertinent positives/negatives as mentioned above.     A complete review of systems was personally done by me. All other systems were negative.       Physical Exam  Temp:  [36.6 °C (97.8 °F)-36.8 °C (98.2 °F)] 36.7 °C (98.1 °F)  Pulse:  [] 95  Resp:  [18] 18  BP: (113-129)/(66-81) 124/79  SpO2:  [94 %-96 %] 95 %    Physical Exam  Vitals reviewed.   Constitutional:       General: She is not in acute distress.     Appearance: Normal appearance. She is normal weight. She is not ill-appearing or diaphoretic.   HENT:      Head: Normocephalic and atraumatic.      Right Ear: External ear normal.      Left Ear: External ear normal.      Mouth/Throat:      Mouth: Mucous membranes are moist.      Pharynx: No oropharyngeal exudate or posterior oropharyngeal erythema.   Eyes:      General: No scleral icterus.     Extraocular Movements: Extraocular movements intact.      Conjunctiva/sclera: Conjunctivae normal.      Pupils:  Pupils are equal, round, and reactive to light.   Cardiovascular:      Rate and Rhythm: Normal rate and regular rhythm.      Heart sounds: Normal heart sounds. No murmur heard.  Pulmonary:      Effort: Pulmonary effort is normal. No respiratory distress.      Breath sounds: No stridor. No wheezing, rhonchi or rales.      Comments: Diminished air entry B/L bases  Chest:      Chest wall: No tenderness.   Abdominal:      General: Abdomen is flat. Bowel sounds are normal. There is no distension.      Palpations: Abdomen is soft. There is no mass.      Tenderness: There is abdominal tenderness (improved epigastrium, upper abdomen). There is no guarding or rebound.   Musculoskeletal:         General: No swelling. Normal range of motion.      Cervical back: Normal range of motion and neck supple. No rigidity. No muscular tenderness.      Comments: Trace ankle and arm edema   Lymphadenopathy:      Cervical: No cervical adenopathy.   Skin:     General: Skin is warm and dry.      Coloration: Skin is not jaundiced.      Findings: No rash.   Neurological:      General: No focal deficit present.      Mental Status: She is alert and oriented to person, place, and time. Mental status is at baseline.      Cranial Nerves: No cranial nerve deficit.   Psychiatric:         Mood and Affect: Mood normal.         Behavior: Behavior normal.         Thought Content: Thought content normal.         Judgment: Judgment normal.       Fluids    Intake/Output Summary (Last 24 hours) at 10/18/2022 1156  Last data filed at 10/17/2022 1646  Gross per 24 hour   Intake 240 ml   Output --   Net 240 ml       Laboratory  Recent Labs     10/16/22  0237 10/17/22  0218 10/18/22  0412   WBC 18.8* 18.1* 16.6*   RBC 4.57 4.14* 4.04*   HEMOGLOBIN 13.8 12.6 12.2   HEMATOCRIT 42.7 38.5 37.6   MCV 93.4 93.0 93.1   MCH 30.2 30.4 30.2   MCHC 32.3* 32.7* 32.4*   RDW 52.4* 51.1* 51.4*   PLATELETCT 254 241 288   MPV 10.1 10.4 10.3     Recent Labs     10/16/22  0237  10/17/22  0218 10/18/22  0412   SODIUM 127* 131* 134*   POTASSIUM 4.8 5.1 5.0   CHLORIDE 97 97 98   CO2 25 25 27   GLUCOSE 134* 116* 123*   BUN 14 16 16   CREATININE 0.89 0.86 0.73   CALCIUM 8.3* 8.3* 8.7                     Imaging  [id number]   Final Result      1. No choledocholithiasis or biliary dilatation.   2. Moderately distended gallbladder. No cholelithiasis.   3. Severe interstitial edematous pancreatitis, progressive since prior study. No drainable organized pancreatic or peripancreatic fluid collections.   4. New moderate right and small left pleural effusions related to pancreatitis. Associated atelectasis.   5. Moderate volume ascites, retroperitoneal edema and mild anasarca, also related to pancreatitis.         CT-ABDOMEN-PELVIS WITH   Final Result      1.  Findings consistent with severe acute pancreatitis.   2.  No bowel obstruction or perforation.         US-RUQ   Final Result      Negative gallbladder/right upper quadrant ultrasound      DX-CHEST-2 VIEWS    (Results Pending)        Assessment/Plan  * Severe acute pancreatitis- (present on admission)  Assessment & Plan  -Unclear cause at this time, no gallstones on right upper quadrant ultrasound, lipids within normal limits and patient does not drink alcohol.  She does have scleroderma, which raises the possibility of autoimmune pancreatitis.  MRCP showed no cholecystodocholithiasis or biliary dilatation. IgG4 level is normal.   -She was also taking some herbal supplements, which could have contributed.  -Lipase has improved significantly and is now normal.  Intermittent abdominal pain likely due to residual inflammation.  Tolerating full liquid diet, will continue.  -Now showing signs of volume overload.  Continue diuresis.  Of IV fluids.  -Continue pain management with oxycodone and IV Dilaudid.    -So far no signs of infection, but continue to monitor.  No signs of pancreatic fluid collection trend on the MRI.  Leukocytosis likely stress  related, trending down.  Continue to trend WBC count, watch for fevers, as may need reimaging if so.    Acute respiratory failure with hypoxemia due to volume overload (HCC)  Assessment & Plan  - Volume overload from treatment of pancreatitis with IV fluids.  Requiring low-flow oxygen supplement.  -Continue IV diuresis, increase IV Lasix to 20 mg twice daily.  Monitor urine output.  -Check chest x-ray.  Send for BNP.  -Continue respiratory support with RT protocol, oxygen supplement.  Keep saturations above 90%.  Wean supplemental oxygen as able.    Hyponatremia- (present on admission)  Assessment & Plan  - Suspect SIADH.  Improved with holding off on IV fluids.  -Continue to trend BMP.  Continue Lasix.    Scleroderma (HCC)- (present on admission)  Assessment & Plan  She has been recently started on methotrexate.    She is back on home methotrexate, takes every 7 days on Sunday night and Monday morning    Hypothyroid- (present on admission)  Assessment & Plan  -Continue home levothyroxine and Cytomel.  TSH within normal limits.    Hyperlipidemia- (present on admission)  Assessment & Plan  - continue to hold off on her simvastatin for now, as it is considered class I drug for drug-induced pancreatitis.       VTE prophylaxis: enoxaparin ppx

## 2022-10-18 NOTE — ASSESSMENT & PLAN NOTE
- Volume overload from treatment of pancreatitis with IV fluids.  She also has moderate left pleural effusion, which she is usual/frequent with severe pancreatitis.  BNP is low.  Requiring low-flow oxygen supplement, respiratory status improving now down to 1 L.  She is also subjectively better, able to breathe deeper, and less frequent cough.  Repeat chest x-ray also showed improvement in pleural effusion.  -Continue IV diuresis with IV Lasix 40 mg twice daily.  Monitor urine output.  -Continue Tessalon as needed for cough.  -Repeat chest x-rays daily.  -Continue respiratory support with RT protocol, oxygen supplement.  Keep saturations above 90%.  Wean supplemental oxygen as able.  -Patient's  requested pulmonology consult in case there is anything else that needs to be done.  I spoke with Dr. Nazario, who will graciously consult.

## 2022-10-18 NOTE — CARE PLAN
The patient is Stable - Low risk of patient condition declining or worsening    Shift Goals  Clinical Goals: Pain and nausea control  Patient Goals: rest and comfort  Family Goals: N/A    Progress made toward(s) clinical / shift goals:    Problem: Pain - Standard  Goal: Alleviation of pain or a reduction in pain to the patient’s comfort goal  Outcome: Progressing       Patient is not progressing towards the following goals:

## 2022-10-19 ENCOUNTER — APPOINTMENT (OUTPATIENT)
Dept: RADIOLOGY | Facility: MEDICAL CENTER | Age: 72
DRG: 438 | End: 2022-10-19
Attending: INTERNAL MEDICINE
Payer: MEDICARE

## 2022-10-19 LAB
ANION GAP SERPL CALC-SCNC: 11 MMOL/L (ref 7–16)
BUN SERPL-MCNC: 12 MG/DL (ref 8–22)
CALCIUM SERPL-MCNC: 8.3 MG/DL (ref 8.4–10.2)
CHLORIDE SERPL-SCNC: 99 MMOL/L (ref 96–112)
CO2 SERPL-SCNC: 27 MMOL/L (ref 20–33)
CREAT SERPL-MCNC: 0.71 MG/DL (ref 0.5–1.4)
ERYTHROCYTE [DISTWIDTH] IN BLOOD BY AUTOMATED COUNT: 49.1 FL (ref 35.9–50)
GFR SERPLBLD CREATININE-BSD FMLA CKD-EPI: 90 ML/MIN/1.73 M 2
GLUCOSE SERPL-MCNC: 115 MG/DL (ref 65–99)
HCT VFR BLD AUTO: 32.7 % (ref 37–47)
HGB BLD-MCNC: 10.5 G/DL (ref 12–16)
MCH RBC QN AUTO: 29.9 PG (ref 27–33)
MCHC RBC AUTO-ENTMCNC: 32.1 G/DL (ref 33.6–35)
MCV RBC AUTO: 93.2 FL (ref 81.4–97.8)
PLATELET # BLD AUTO: 278 K/UL (ref 164–446)
PMV BLD AUTO: 9.8 FL (ref 9–12.9)
POTASSIUM SERPL-SCNC: 4 MMOL/L (ref 3.6–5.5)
RBC # BLD AUTO: 3.51 M/UL (ref 4.2–5.4)
SODIUM SERPL-SCNC: 137 MMOL/L (ref 135–145)
WBC # BLD AUTO: 11.3 K/UL (ref 4.8–10.8)

## 2022-10-19 PROCEDURE — 80048 BASIC METABOLIC PNL TOTAL CA: CPT

## 2022-10-19 PROCEDURE — A9270 NON-COVERED ITEM OR SERVICE: HCPCS | Performed by: INTERNAL MEDICINE

## 2022-10-19 PROCEDURE — 700117 HCHG RX CONTRAST REV CODE 255: Performed by: INTERNAL MEDICINE

## 2022-10-19 PROCEDURE — 36415 COLL VENOUS BLD VENIPUNCTURE: CPT

## 2022-10-19 PROCEDURE — 99233 SBSQ HOSP IP/OBS HIGH 50: CPT | Performed by: INTERNAL MEDICINE

## 2022-10-19 PROCEDURE — 85027 COMPLETE CBC AUTOMATED: CPT

## 2022-10-19 PROCEDURE — 74170 CT ABD WO CNTRST FLWD CNTRST: CPT

## 2022-10-19 PROCEDURE — 770006 HCHG ROOM/CARE - MED/SURG/GYN SEMI*

## 2022-10-19 PROCEDURE — 94760 N-INVAS EAR/PLS OXIMETRY 1: CPT

## 2022-10-19 PROCEDURE — 700102 HCHG RX REV CODE 250 W/ 637 OVERRIDE(OP): Performed by: INTERNAL MEDICINE

## 2022-10-19 PROCEDURE — 700111 HCHG RX REV CODE 636 W/ 250 OVERRIDE (IP): Performed by: INTERNAL MEDICINE

## 2022-10-19 RX ORDER — FUROSEMIDE 10 MG/ML
20 INJECTION INTRAMUSCULAR; INTRAVENOUS ONCE
Status: COMPLETED | OUTPATIENT
Start: 2022-10-19 | End: 2022-10-19

## 2022-10-19 RX ORDER — FUROSEMIDE 10 MG/ML
40 INJECTION INTRAMUSCULAR; INTRAVENOUS
Status: DISCONTINUED | OUTPATIENT
Start: 2022-10-19 | End: 2022-10-21 | Stop reason: HOSPADM

## 2022-10-19 RX ORDER — BENZONATATE 100 MG/1
100 CAPSULE ORAL 3 TIMES DAILY PRN
Status: DISCONTINUED | OUTPATIENT
Start: 2022-10-19 | End: 2022-10-21 | Stop reason: HOSPADM

## 2022-10-19 RX ADMIN — LEVOTHYROXINE SODIUM 50 MCG: 50 TABLET ORAL at 04:38

## 2022-10-19 RX ADMIN — FUROSEMIDE 20 MG: 10 INJECTION, SOLUTION INTRAMUSCULAR; INTRAVENOUS at 04:37

## 2022-10-19 RX ADMIN — SENNOSIDES AND DOCUSATE SODIUM 2 TABLET: 50; 8.6 TABLET ORAL at 17:07

## 2022-10-19 RX ADMIN — IOHEXOL 100 ML: 350 INJECTION, SOLUTION INTRAVENOUS at 15:11

## 2022-10-19 RX ADMIN — FUROSEMIDE 40 MG: 10 INJECTION, SOLUTION INTRAMUSCULAR; INTRAVENOUS at 15:55

## 2022-10-19 RX ADMIN — SENNOSIDES AND DOCUSATE SODIUM 2 TABLET: 50; 8.6 TABLET ORAL at 04:37

## 2022-10-19 RX ADMIN — LIOTHYRONINE SODIUM 2.5 MCG: 5 TABLET ORAL at 04:37

## 2022-10-19 RX ADMIN — FUROSEMIDE 20 MG: 10 INJECTION, SOLUTION INTRAMUSCULAR; INTRAVENOUS at 09:32

## 2022-10-19 RX ADMIN — FOLIC ACID 1 MG: 1 TABLET ORAL at 04:37

## 2022-10-19 ASSESSMENT — PAIN DESCRIPTION - PAIN TYPE
TYPE: ACUTE PAIN
TYPE: ACUTE PAIN

## 2022-10-19 NOTE — CARE PLAN
The patient is Stable - Low risk of patient condition declining or worsening    Shift Goals  Clinical Goals: Rest  Patient Goals: rest and comfort  Family Goals: N/A    Progress made toward(s) clinical / shift goals:    Problem: Pain - Standard  Goal: Alleviation of pain or a reduction in pain to the patient’s comfort goal  Outcome: Progressing       Patient is not progressing towards the following goals:

## 2022-10-19 NOTE — PROGRESS NOTES
Pt is sitting upright in bed when received. No complaints of pain at this time. Verbalizes needs well and demonstrates use of call bell. Call bell in reach.    Chart check completed

## 2022-10-19 NOTE — PROGRESS NOTES
Received patient from Night shift RN . Patient is awake and alert x 4.Non productive cough noted. Patient denies any n/v. Left arm is still swollen. ON 2 liters via NC. O2 sat 97% at rest. Fall precaution in placed, kept bed in lowest position and call light within reach.breathing exercise and use of  I.S encouraged.    0815 - Patient's desat to 87%(RA) at rest , Hooked back to O2 at 1 liter.             To continue monitor O2 saturation    1449- Patient is off to floor for CT Pancreas and abdomen, transporter brought the patient to radiology via wheelchair    1509- Patient back to floor,alert and oriented x 4    1611- Patient kept on RA, maintaining O2 sat 95%    16:19 Patient desat 88% on RA, put it back to O2 at 1 liter

## 2022-10-19 NOTE — CARE PLAN
The patient is Stable - Low risk of patient condition declining or worsening    Shift Goals  Clinical Goals: patient will be wean off from O2  Patient Goals: rest comfortbaly  Family Goals: N/A    Progress made toward(s) clinical / shift goals:  Patient tried to kept on RA but patient has episode of desaturation, 87% on RA, back to 1 liter via NC. Encouraged patient to use IS. CX ray tomorrow morning as ordered.       Patient is not progressing towards the following goals:    Problem: Pain - Standard  Goal: Alleviation of pain or a reduction in pain to the patient’s comfort goal  10/19/2022 1457 by Sara Alcantara R.N.  Outcome: Progressing    Problem: Fluid Volume  Goal: Fluid volume balance will be maintained  10/19/2022 1457 by Sara Alcantara R.N.  Outcome: Progressing    Problem: Hemodynamics  Goal: Patient's hemodynamics, fluid balance and neurologic status will be stable or improve  Outcome: Progressing     Problem: Respiratory  Goal: Patient will achieve/maintain optimum respiratory ventilation and gas exchange  Outcome: Progressing     Problem: Nutrition  Goal: Patient's nutritional and fluid intake will be adequate or improve  Outcome: Progressing

## 2022-10-19 NOTE — PROGRESS NOTES
Hospital Medicine Daily Progress Note    Date of Service  10/19/2022    Chief Complaint  Upper abdominal pain    Hospital Course  Miranda Rose is a 72 y.o. female with anxiety, hyperlipidemia, hypothyroidism, chronic diarrhea, history of EBV, and recent diagnosis of scleroderma on weekly methotrexate due to folic acid, admitted 10/14/2022 with acute onset upper abdominal pain, radiating to the back, with associated nausea.  Bowel movements remained unchanged.  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  Evaluation showed significant elevated lipase of greater than 3000, triglycerides 52.  WBC count, electrolytes and renal function are normal.  LFTs are normal.  She had a negative right upper quadrant ultrasound.  CT of the abdomen showed findings consistent with severe acute pancreatitis, with edema seen about the pancreas extending into the mesenteric root and caroline hepatis, and extending into the anterior pararenal spaces bilaterally. She has acute pancreatitis, and was placed on aggressive IV fluid hydration, analgesics, and bowel rest.  She clinically improved.  Her lipase has significantly improved.  She shared that she has strong family history of bile duct and gallbladder problems, prompting an MRCP which showed no choledocholithiasis or biliary dilatation, no cholelithiasis with moderately distended gallbladder, with severe interstitial edematous pancreatitis with no drainable organized pancreatic or peripancreatic fluid collection, with new moderate right and small left pleural effusions related to pancreatitis, and moderate volume ascites, retroperitoneal edema, and mild anasarca also related to pancreatitis.  IgG4 antibodies level is normal.  Her diet was advanced to full liquids.  She started to show signs of volume overload, and her IV fluids were discontinued and she is started on IV Lasix.  Her lipase has normalized.  Her WBC  count trended down.  She diuresed well.  She was evaluated by PT/OT.    Interval Problem Update  10/19/2022 - I reviewed the patient's chart today. Uneventful night. VSS. Afebrile.  Now down to 1 L of oxygen by nasal cannula.  WBC count improved to 11,300.  Hemoglobin 10.5.  Electrolytes and renal function remain normal.  BNP is low at 65.  Chest x-ray showed moderate left pleural effusion (previously seen on the MR I) with no pneumothorax, with patchy bibasilar opacities (personally reviewed).    > I have personally seen and examined the patient today.  She is feeling better, although still short of breath and having nagging cough.  Abdominal pain is better.  Tolerating full liquid diet.  She remains feeling puffy particularly on her arm, and thighs.  No nausea, vomiting.  No fevers or chills.    I have discussed this patient's plan of care and discharge plan at IDT rounds today with Case Management, Nursing, Nursing leadership, and other members of the IDT team.    Consultants/Specialty  None    Code Status  Full Code    Disposition  Patient is not medically cleared for discharge.   Anticipate discharge to to home with close outpatient follow-up.  I have placed the appropriate orders for post-discharge needs.    Review of Systems  ROS     Pertinent positives/negatives as mentioned above.     A complete review of systems was personally done by me. All other systems were negative.       Physical Exam  Temp:  [36.3 °C (97.3 °F)-37.2 °C (98.9 °F)] 36.3 °C (97.3 °F)  Pulse:  [83-99] 83  Resp:  [18-20] 20  BP: (105-131)/(66-71) 107/66  SpO2:  [87 %-97 %] 93 %    Physical Exam  Vitals reviewed.   Constitutional:       General: She is not in acute distress.     Appearance: Normal appearance. She is normal weight. She is not ill-appearing or diaphoretic.   HENT:      Head: Normocephalic and atraumatic.      Right Ear: External ear normal.      Left Ear: External ear normal.      Mouth/Throat:      Mouth: Mucous membranes  are moist.      Pharynx: No oropharyngeal exudate or posterior oropharyngeal erythema.   Eyes:      General: No scleral icterus.     Extraocular Movements: Extraocular movements intact.      Conjunctiva/sclera: Conjunctivae normal.      Pupils: Pupils are equal, round, and reactive to light.   Cardiovascular:      Rate and Rhythm: Normal rate and regular rhythm.      Heart sounds: Normal heart sounds. No murmur heard.  Pulmonary:      Effort: Pulmonary effort is normal. No respiratory distress.      Breath sounds: No stridor. No wheezing, rhonchi or rales.      Comments: Diminished air entry B/L bases  Chest:      Chest wall: No tenderness.   Abdominal:      General: Abdomen is flat. Bowel sounds are normal. There is no distension.      Palpations: Abdomen is soft. There is no mass.      Tenderness: There is no abdominal tenderness. There is no guarding or rebound.   Musculoskeletal:         General: No swelling. Normal range of motion.      Cervical back: Normal range of motion and neck supple. No rigidity. No muscular tenderness.      Comments: Trace thigh and arm edema   Lymphadenopathy:      Cervical: No cervical adenopathy.   Skin:     General: Skin is warm and dry.      Coloration: Skin is not jaundiced.      Findings: No rash.   Neurological:      General: No focal deficit present.      Mental Status: She is alert and oriented to person, place, and time. Mental status is at baseline.      Cranial Nerves: No cranial nerve deficit.   Psychiatric:         Mood and Affect: Mood normal.         Behavior: Behavior normal.         Thought Content: Thought content normal.         Judgment: Judgment normal.       Fluids    Intake/Output Summary (Last 24 hours) at 10/19/2022 1131  Last data filed at 10/19/2022 1056  Gross per 24 hour   Intake 450 ml   Output 1450 ml   Net -1000 ml       Laboratory  Recent Labs     10/17/22  0218 10/18/22  0412 10/19/22  0149   WBC 18.1* 16.6* 11.3*   RBC 4.14* 4.04* 3.51*   HEMOGLOBIN  12.6 12.2 10.5*   HEMATOCRIT 38.5 37.6 32.7*   MCV 93.0 93.1 93.2   MCH 30.4 30.2 29.9   MCHC 32.7* 32.4* 32.1*   RDW 51.1* 51.4* 49.1   PLATELETCT 241 288 278   MPV 10.4 10.3 9.8     Recent Labs     10/17/22  0218 10/18/22  0412 10/19/22  0149   SODIUM 131* 134* 137   POTASSIUM 5.1 5.0 4.0   CHLORIDE 97 98 99   CO2 25 27 27   GLUCOSE 116* 123* 115*   BUN 16 16 12   CREATININE 0.86 0.73 0.71   CALCIUM 8.3* 8.7 8.3*                     Imaging  DX-CHEST-2 VIEWS   Final Result         Moderate left pleural effusion.      Patchy bibasilar atelectasis.      [id number]   Final Result      1. No choledocholithiasis or biliary dilatation.   2. Moderately distended gallbladder. No cholelithiasis.   3. Severe interstitial edematous pancreatitis, progressive since prior study. No drainable organized pancreatic or peripancreatic fluid collections.   4. New moderate right and small left pleural effusions related to pancreatitis. Associated atelectasis.   5. Moderate volume ascites, retroperitoneal edema and mild anasarca, also related to pancreatitis.         CT-ABDOMEN-PELVIS WITH   Final Result      1.  Findings consistent with severe acute pancreatitis.   2.  No bowel obstruction or perforation.         US-RUQ   Final Result      Negative gallbladder/right upper quadrant ultrasound           Assessment/Plan  * Severe acute pancreatitis- (present on admission)  Assessment & Plan  -Unclear cause at this time, no gallstones on right upper quadrant ultrasound, lipids within normal limits and patient does not drink alcohol.  She does have scleroderma, which raises the possibility of autoimmune pancreatitis.  MRCP showed no cholecystodocholithiasis or biliary dilatation. IgG4 level is normal.   -She was also taking some herbal supplements, which could have contributed.  -Lipase has improved significantly and is now normal.  Abdominal pain is significantly improved. Tolerating full liquid diet.  Advance diet to GI soft.  -Now  showing signs of volume overload.  Continue diuresis.  Off IV fluids.  -Continue pain management with oxycodone and IV Dilaudid.    -So far no signs of infection, but continue to monitor.  No signs of pancreatic fluid collection trend on the MRI.  Leukocytosis likely stress related, trending down.  Continue to trend WBC count, watch for fevers, as may need reimaging if so.    Acute respiratory failure with hypoxemia due to volume overload (HCC)  Assessment & Plan  - Volume overload from treatment of pancreatitis with IV fluids.  She also has moderate left pleural effusion, which she is usual/frequent with severe pancreatitis.  BNP is low.  Requiring low-flow oxygen supplement, respiratory status improving now down to 1 L.  -Continue IV diuresis, increase IV Lasix to 40 mg twice daily.  Monitor urine output.  -Start Tessalon as needed for cough.  -Repeat chest x-ray tomorrow.  -Continue respiratory support with RT protocol, oxygen supplement.  Keep saturations above 90%.  Wean supplemental oxygen as able.    Hyponatremia- (present on admission)  Assessment & Plan  - Suspect SIADH.  Improved with holding off on IV fluids.  Sodium has now normalized.  -Continue to trend BMP.  Continue Lasix.    Scleroderma (HCC)- (present on admission)  Assessment & Plan  She has been recently started on methotrexate.    She is back on home methotrexate, takes every 7 days on Sunday night and Monday morning    Hypothyroid- (present on admission)  Assessment & Plan  -Continue home levothyroxine and Cytomel.  TSH within normal limits.    Hyperlipidemia- (present on admission)  Assessment & Plan  - continue to hold off on her simvastatin for now, as it is considered class I drug for drug-induced pancreatitis.       VTE prophylaxis: enoxaparin ppx

## 2022-10-20 ENCOUNTER — APPOINTMENT (OUTPATIENT)
Dept: RADIOLOGY | Facility: MEDICAL CENTER | Age: 72
DRG: 438 | End: 2022-10-20
Attending: INTERNAL MEDICINE
Payer: MEDICARE

## 2022-10-20 PROBLEM — R60.0 EDEMA OF LEFT UPPER EXTREMITY: Status: ACTIVE | Noted: 2022-10-20

## 2022-10-20 PROBLEM — E87.6 HYPOKALEMIA: Status: ACTIVE | Noted: 2022-10-20

## 2022-10-20 LAB
ANION GAP SERPL CALC-SCNC: 12 MMOL/L (ref 7–16)
BUN SERPL-MCNC: 9 MG/DL (ref 8–22)
CALCIUM SERPL-MCNC: 8 MG/DL (ref 8.4–10.2)
CHLORIDE SERPL-SCNC: 96 MMOL/L (ref 96–112)
CO2 SERPL-SCNC: 28 MMOL/L (ref 20–33)
CREAT SERPL-MCNC: 0.56 MG/DL (ref 0.5–1.4)
ERYTHROCYTE [DISTWIDTH] IN BLOOD BY AUTOMATED COUNT: 47.8 FL (ref 35.9–50)
ERYTHROCYTE [DISTWIDTH] IN BLOOD BY AUTOMATED COUNT: 48.8 FL (ref 35.9–50)
GFR SERPLBLD CREATININE-BSD FMLA CKD-EPI: 97 ML/MIN/1.73 M 2
GLUCOSE SERPL-MCNC: 114 MG/DL (ref 65–99)
HCT VFR BLD AUTO: 30 % (ref 37–47)
HCT VFR BLD AUTO: 33.3 % (ref 37–47)
HGB BLD-MCNC: 11 G/DL (ref 12–16)
HGB BLD-MCNC: 9.9 G/DL (ref 12–16)
MAGNESIUM SERPL-MCNC: 1.6 MG/DL (ref 1.5–2.5)
MCH RBC QN AUTO: 30.3 PG (ref 27–33)
MCH RBC QN AUTO: 30.3 PG (ref 27–33)
MCHC RBC AUTO-ENTMCNC: 33 G/DL (ref 33.6–35)
MCHC RBC AUTO-ENTMCNC: 33 G/DL (ref 33.6–35)
MCV RBC AUTO: 91.7 FL (ref 81.4–97.8)
MCV RBC AUTO: 91.7 FL (ref 81.4–97.8)
PLATELET # BLD AUTO: 287 K/UL (ref 164–446)
PLATELET # BLD AUTO: 340 K/UL (ref 164–446)
PMV BLD AUTO: 10 FL (ref 9–12.9)
PMV BLD AUTO: 10.1 FL (ref 9–12.9)
POTASSIUM SERPL-SCNC: 3 MMOL/L (ref 3.6–5.5)
PROCALCITONIN SERPL-MCNC: 0.06 NG/ML
RBC # BLD AUTO: 3.27 M/UL (ref 4.2–5.4)
RBC # BLD AUTO: 3.63 M/UL (ref 4.2–5.4)
SODIUM SERPL-SCNC: 136 MMOL/L (ref 135–145)
WBC # BLD AUTO: 8.1 K/UL (ref 4.8–10.8)
WBC # BLD AUTO: 9.5 K/UL (ref 4.8–10.8)

## 2022-10-20 PROCEDURE — 99221 1ST HOSP IP/OBS SF/LOW 40: CPT | Performed by: INTERNAL MEDICINE

## 2022-10-20 PROCEDURE — 84145 PROCALCITONIN (PCT): CPT

## 2022-10-20 PROCEDURE — 700111 HCHG RX REV CODE 636 W/ 250 OVERRIDE (IP): Performed by: INTERNAL MEDICINE

## 2022-10-20 PROCEDURE — 83735 ASSAY OF MAGNESIUM: CPT

## 2022-10-20 PROCEDURE — 80048 BASIC METABOLIC PNL TOTAL CA: CPT

## 2022-10-20 PROCEDURE — 94760 N-INVAS EAR/PLS OXIMETRY 1: CPT

## 2022-10-20 PROCEDURE — 700102 HCHG RX REV CODE 250 W/ 637 OVERRIDE(OP): Performed by: INTERNAL MEDICINE

## 2022-10-20 PROCEDURE — 36415 COLL VENOUS BLD VENIPUNCTURE: CPT

## 2022-10-20 PROCEDURE — 71045 X-RAY EXAM CHEST 1 VIEW: CPT

## 2022-10-20 PROCEDURE — A9270 NON-COVERED ITEM OR SERVICE: HCPCS | Performed by: INTERNAL MEDICINE

## 2022-10-20 PROCEDURE — 770006 HCHG ROOM/CARE - MED/SURG/GYN SEMI*

## 2022-10-20 PROCEDURE — 99233 SBSQ HOSP IP/OBS HIGH 50: CPT | Performed by: INTERNAL MEDICINE

## 2022-10-20 PROCEDURE — 93971 EXTREMITY STUDY: CPT | Mod: LT

## 2022-10-20 PROCEDURE — 85027 COMPLETE CBC AUTOMATED: CPT

## 2022-10-20 RX ORDER — POTASSIUM CHLORIDE 20 MEQ/1
60 TABLET, EXTENDED RELEASE ORAL ONCE
Status: COMPLETED | OUTPATIENT
Start: 2022-10-20 | End: 2022-10-20

## 2022-10-20 RX ORDER — MAGNESIUM SULFATE HEPTAHYDRATE 40 MG/ML
2 INJECTION, SOLUTION INTRAVENOUS ONCE
Status: COMPLETED | OUTPATIENT
Start: 2022-10-20 | End: 2022-10-20

## 2022-10-20 RX ADMIN — RIVAROXABAN 15 MG: 15 TABLET, FILM COATED ORAL at 13:49

## 2022-10-20 RX ADMIN — FUROSEMIDE 40 MG: 10 INJECTION, SOLUTION INTRAMUSCULAR; INTRAVENOUS at 04:49

## 2022-10-20 RX ADMIN — FOLIC ACID 1 MG: 1 TABLET ORAL at 04:49

## 2022-10-20 RX ADMIN — SENNOSIDES AND DOCUSATE SODIUM 2 TABLET: 50; 8.6 TABLET ORAL at 17:14

## 2022-10-20 RX ADMIN — CARBOXYMETHYLCELLULOSE SODIUM 1 DROP: 5 SOLUTION/ DROPS OPHTHALMIC at 20:15

## 2022-10-20 RX ADMIN — RIVAROXABAN 15 MG: 15 TABLET, FILM COATED ORAL at 20:09

## 2022-10-20 RX ADMIN — POTASSIUM CHLORIDE 60 MEQ: 1500 TABLET, EXTENDED RELEASE ORAL at 07:38

## 2022-10-20 RX ADMIN — MAGNESIUM SULFATE HEPTAHYDRATE 2 G: 40 INJECTION, SOLUTION INTRAVENOUS at 12:00

## 2022-10-20 RX ADMIN — LIOTHYRONINE SODIUM 2.5 MCG: 5 TABLET ORAL at 04:49

## 2022-10-20 RX ADMIN — FUROSEMIDE 40 MG: 10 INJECTION, SOLUTION INTRAMUSCULAR; INTRAVENOUS at 15:21

## 2022-10-20 RX ADMIN — SENNOSIDES AND DOCUSATE SODIUM 2 TABLET: 50; 8.6 TABLET ORAL at 04:49

## 2022-10-20 RX ADMIN — LEVOTHYROXINE SODIUM 50 MCG: 50 TABLET ORAL at 04:49

## 2022-10-20 ASSESSMENT — ENCOUNTER SYMPTOMS: WEAKNESS: 1

## 2022-10-20 ASSESSMENT — PATIENT HEALTH QUESTIONNAIRE - PHQ9
SUM OF ALL RESPONSES TO PHQ9 QUESTIONS 1 AND 2: 0
2. FEELING DOWN, DEPRESSED, IRRITABLE, OR HOPELESS: NOT AT ALL
1. LITTLE INTEREST OR PLEASURE IN DOING THINGS: NOT AT ALL

## 2022-10-20 ASSESSMENT — PAIN DESCRIPTION - PAIN TYPE
TYPE: ACUTE PAIN
TYPE: ACUTE PAIN

## 2022-10-20 NOTE — PROGRESS NOTES
[facility] Medicine Daily Progress Note    Date of Service  10/20/2022    Chief Complaint  Upper abdominal pain    Hospital Course  Miranda Rose is a 72 y.o. female with anxiety, hyperlipidemia, hypothyroidism, chronic diarrhea, history of EBV, and recent diagnosis of scleroderma on weekly methotrexate due to folic acid, admitted 10/14/2022 with acute onset upper abdominal pain, radiating to the back, with associated nausea.  Bowel movements remained unchanged.  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  Evaluation showed significant elevated lipase of greater than 3000, triglycerides 52.  WBC count, electrolytes and renal function are normal.  LFTs are normal.  She had a negative right upper quadrant ultrasound.  CT of the abdomen showed findings consistent with severe acute pancreatitis, with edema seen about the pancreas extending into the mesenteric root and caroline hepatis, and extending into the anterior pararenal spaces bilaterally. She has acute pancreatitis, and was placed on aggressive IV fluid hydration, analgesics, and bowel rest.  She clinically improved.  Her lipase has significantly improved.  She shared that she has strong family history of bile duct and gallbladder problems, prompting an MRCP which showed no choledocholithiasis or biliary dilatation, no cholelithiasis with moderately distended gallbladder, with severe interstitial edematous pancreatitis with no drainable organized pancreatic or peripancreatic fluid collection, with new moderate right and small left pleural effusions related to pancreatitis, and moderate volume ascites, retroperitoneal edema, and mild anasarca also related to pancreatitis.  IgG4 antibodies level is normal.  Her diet was advanced to full liquids.  She started to show signs of volume overload, with chest x-ray showing pulmonary edema, and moderate left pleural effusion. Her IV fluids were  discontinued and she is started on IV Lasix.  Her lipase has normalized.  Her WBC count trended down.  She diuresed well.  She was evaluated by PT/OT.    Interval Problem Update  10/20/2022 - I reviewed the patient's chart. There were no significant overnight events. Remains hemodynamically stable and afebrile. Stable on 1L O2 NC, saturating 96%.  WBC count is normalized.  Hemoglobin 9.9.  Platelet count is normal.  Potassium 3.0.  Repeat CT scan showed mildly improved findings of pancreatitis with no focal fluid collection.  Repeat chest x-ray this morning (personally reviewed) showed the left pleural effusion is now small (from moderate).  Procalcitonin is low. She diuresed very well, had more than 4 L of output last night.  Per nursing, she has tolerated oral intake and GI soft diet.    > I have personally seen and examined the patient today.  She states she is feeling better, she can take deeper breaths now, and she is not coughing as often.  Leg edema has significantly improved.  However she has persistent left arm edema, where she had multiple IV lines in place before.  She is tolerating oral diet.  No nausea, vomiting.  Continues to have back pain, but responds to pain medications.      I have discussed this patient's plan of care and discharge plan at IDT rounds today with Case Management, Nursing, Nursing leadership, and other members of the IDT team.    Consultants/Specialty  None    Code Status  Full Code    Disposition  Patient is not medically cleared for discharge.   Anticipate discharge to to home with close outpatient follow-up.  I have placed the appropriate orders for post-discharge needs.    Review of Systems  ROS     Pertinent positives/negatives as mentioned above.     A complete review of systems was personally done by me. All other systems were negative.       Physical Exam  Temp:  [36.1 °C (97 °F)-36.9 °C (98.4 °F)] 36.9 °C (98.4 °F)  Pulse:  [80-87] 82  Resp:  [18] 18  BP: (100-117)/(60-68)  100/62  SpO2:  [88 %-96 %] 94 %    Physical Exam  Vitals reviewed.   Constitutional:       General: She is not in acute distress.     Appearance: Normal appearance. She is normal weight. She is not ill-appearing or diaphoretic.   HENT:      Head: Normocephalic and atraumatic.      Right Ear: External ear normal.      Left Ear: External ear normal.      Mouth/Throat:      Mouth: Mucous membranes are moist.      Pharynx: No oropharyngeal exudate or posterior oropharyngeal erythema.   Eyes:      General: No scleral icterus.     Extraocular Movements: Extraocular movements intact.      Conjunctiva/sclera: Conjunctivae normal.      Pupils: Pupils are equal, round, and reactive to light.   Cardiovascular:      Rate and Rhythm: Normal rate and regular rhythm.      Heart sounds: Normal heart sounds. No murmur heard.  Pulmonary:      Effort: Pulmonary effort is normal. No respiratory distress.      Breath sounds: No stridor. No wheezing, rhonchi or rales.      Comments: Improved air entry B/L bases  Chest:      Chest wall: No tenderness.   Abdominal:      General: Abdomen is flat. Bowel sounds are normal. There is no distension.      Palpations: Abdomen is soft. There is no mass.      Tenderness: There is no abdominal tenderness. There is no guarding or rebound.   Musculoskeletal:         General: No swelling. Normal range of motion.      Cervical back: Normal range of motion and neck supple. No rigidity. No muscular tenderness.      Right lower leg: No edema.      Left lower leg: No edema.      Comments: Left arm edema.   Lymphadenopathy:      Cervical: No cervical adenopathy.   Skin:     General: Skin is warm and dry.      Coloration: Skin is not jaundiced.      Findings: No rash.   Neurological:      General: No focal deficit present.      Mental Status: She is alert and oriented to person, place, and time. Mental status is at baseline.      Cranial Nerves: No cranial nerve deficit.   Psychiatric:         Mood and Affect:  Mood normal.         Behavior: Behavior normal.         Thought Content: Thought content normal.         Judgment: Judgment normal.       Fluids    Intake/Output Summary (Last 24 hours) at 10/20/2022 1132  Last data filed at 10/20/2022 1051  Gross per 24 hour   Intake 1360 ml   Output 4400 ml   Net -3040 ml       Laboratory  Recent Labs     10/18/22  0412 10/19/22  0149 10/20/22  0323   WBC 16.6* 11.3* 8.1   RBC 4.04* 3.51* 3.27*   HEMOGLOBIN 12.2 10.5* 9.9*   HEMATOCRIT 37.6 32.7* 30.0*   MCV 93.1 93.2 91.7   MCH 30.2 29.9 30.3   MCHC 32.4* 32.1* 33.0*   RDW 51.4* 49.1 48.8   PLATELETCT 288 278 287   MPV 10.3 9.8 10.1     Recent Labs     10/18/22  0412 10/19/22  0149 10/20/22  0323   SODIUM 134* 137 136   POTASSIUM 5.0 4.0 3.0*   CHLORIDE 98 99 96   CO2 27 27 28   GLUCOSE 123* 115* 114*   BUN 16 12 9   CREATININE 0.73 0.71 0.56   CALCIUM 8.7 8.3* 8.0*                     Imaging  DX-CHEST-PORTABLE (1 VIEW)   Final Result      No significant change in bibasilar atelectasis and/or pneumonia and small left pleural effusion.      CT-PANCREAS AND ABDOMEN WITH & W/O   Final Result      1.  Probable mildly improved findings of pancreatitis. No focal fluid collection.   2.  New small to moderate pleural effusions with associated compressive atelectasis.            DX-CHEST-2 VIEWS   Final Result         Moderate left pleural effusion.      Patchy bibasilar atelectasis.      [id number]   Final Result      1. No choledocholithiasis or biliary dilatation.   2. Moderately distended gallbladder. No cholelithiasis.   3. Severe interstitial edematous pancreatitis, progressive since prior study. No drainable organized pancreatic or peripancreatic fluid collections.   4. New moderate right and small left pleural effusions related to pancreatitis. Associated atelectasis.   5. Moderate volume ascites, retroperitoneal edema and mild anasarca, also related to pancreatitis.         CT-ABDOMEN-PELVIS WITH   Final Result      1.   Findings consistent with severe acute pancreatitis.   2.  No bowel obstruction or perforation.         US-RUQ   Final Result      Negative gallbladder/right upper quadrant ultrasound      US-EXTREMITY VENOUS UPPER UNILAT LEFT    (Results Pending)        Assessment/Plan  * Severe acute pancreatitis- (present on admission)  Assessment & Plan  -Unclear cause at this time, no gallstones on right upper quadrant ultrasound, lipids within normal limits and patient does not drink alcohol.  She does have scleroderma, which raises the possibility of autoimmune pancreatitis.  MRCP showed no cholecystodocholithiasis or biliary dilatation. IgG4 level is normal.   -She was also taking some herbal supplements, which could have contributed.  -Repeat CT showed no focal fluid collection, with improvement in pancreatic inflammation. Lipase has improved significantly and is now normal.  Abdominal pain is significantly improved, with only residual referred back pain. Tolerating GI soft diet.  -showed signs of volume overload.  Continue diuresis.  Off IV fluids.  -Continue pain management with oxycodone and IV Dilaudid.    -So far no signs of infection, but continue to monitor.  No signs of pancreatic fluid collection trend on the MRI.  Leukocytosis likely stress related, trending down.  Continue to trend WBC count, watch for fevers.    Acute respiratory failure with hypoxemia due to volume overload (HCC)  Assessment & Plan  - Volume overload from treatment of pancreatitis with IV fluids.  She also has moderate left pleural effusion, which she is usual/frequent with severe pancreatitis.  BNP is low.  Requiring low-flow oxygen supplement, respiratory status improving now down to 1 L.  She is also subjectively better, able to breathe deeper, and less frequent cough.  Repeat chest x-ray also showed improvement in pleural effusion.  -Continue IV diuresis with IV Lasix 40 mg twice daily.  Monitor urine output.  -Continue Tessalon as needed  for cough.  -Repeat chest x-rays daily.  -Continue respiratory support with RT protocol, oxygen supplement.  Keep saturations above 90%.  Wean supplemental oxygen as able.  -Patient's  requested pulmonology consult in case there is anything else that needs to be done.  I spoke with Dr. Nazario, who will graciously consult.    Edema of left upper extremity  Assessment & Plan  - Suspect superficial venous thrombosis from peripheral IVs, but will obtain a venous duplex ultrasound of the left lower extremity to rule out DVT.  -Continue diuretics.    Hypokalemia  Assessment & Plan  - Likely due to diuresis.  Magnesium 1.6.  - Replace with 60 mEq of oral K-Dur.  I will also give her 2 g of IV magnesium sulfate to replenish her magnesium stores.  -Repeat BMP and magnesium levels in the morning.    Hyponatremia- (present on admission)  Assessment & Plan  - Suspect SIADH.  Improved with holding off on IV fluids, and diuretics.  Sodium has now normalized.  -Continue to trend BMP.  Continue Lasix.    Scleroderma (HCC)- (present on admission)  Assessment & Plan  She has been recently started on methotrexate.    She is back on home methotrexate, takes every 7 days on Sunday night and Monday morning    Hypothyroid- (present on admission)  Assessment & Plan  -Continue home levothyroxine and Cytomel.  TSH within normal limits.    Hyperlipidemia- (present on admission)  Assessment & Plan  - continue to hold off on her simvastatin for now, as it is considered class I drug for drug-induced pancreatitis.       VTE prophylaxis: enoxaparin ppx

## 2022-10-20 NOTE — DISCHARGE PLANNING
Received Choice form at 1600  Agency/Facility Name: [facility]  Referral sent per Choice form @ [id number]

## 2022-10-20 NOTE — CONSULTS
Pulmonary Consultation    Date of consult: 10/20/2022    Referring Physician  Abdulkadir Sanabria M.D.    Reason for Consultation  Pleural effusion    History of Presenting Illness  72 y.o. female who presented 10/14/2022 with  anxiety, hyperlipidemia, hypothyroidism, chronic diarrhea, history of EBV, and recent diagnosis of scleroderma on weekly methotrexate.  Presented with back pain and adominal pain and nausea   Lipase in 3000 range and CT abdoman c/w pancreatitis  Pt requiring one l NC to maintain sats > 88%  Overall pancreatitis has improved and also has her effusion on the left side  She is still quite weak and using her IS that is up to 1000 no        Code Status  Full Code    Review of Systems  Review of Systems   Constitutional:  Positive for malaise/fatigue.   Cardiovascular:         Left arm swelling   Gastrointestinal:         Abdominal distention and pain still in the back but not in abdoman   Neurological:  Positive for weakness.     Past Medical History   has a past medical history of Anxiety, Arrhythmia, Arthritis, Bowel habit changes, Lenin Barr virus infection, Herpes, High blood pressure (6/13/2018), High cholesterol, Hypercholesteremia, Hypothyroid, Pneumonia (2019), Thyroid disease, and Tinnitus.    She has no past medical history of ASTHMA.    Surgical History   has a past surgical history that includes lumpectomy; hip replacement, total; other orthopedic surgery (Left, 2007); abdominal hysterectomy total (2004); primary c section; other (2010); colonoscopy; and myringotomy, bilateral, with insertion of tympanostomy d (Bilateral, 5/10/2021).    Family History  family history includes Cancer in her father; Diabetes in her father and mother; Hyperlipidemia in her father and mother; Hypertension in her father and mother.    Social History   reports that she quit smoking about 37 years ago. She has never used smokeless tobacco. She reports current alcohol use of about 1.0 oz per week. She reports  that she does not use drugs.    Medications  Home Medications       Reviewed by Linus Daniel (Pharmacy Tech) on 10/14/22 at 1436  Med List Status: Complete     Medication Last Dose Status   5-Hydroxytryptophan (5-HTP PO) 10/13/2022 Active   Bioflavonoid Products (KYARA C PO) 10/13/2022 Active   Cholecalciferol (VITAMIN D3) 2000 UNIT Cap 10/13/2022 Active   Coenzyme Q10 (CO Q 10 PO) FEW DAYS AGO Active   CRANBERRY PO FEW DAYS AGO Active   cyclosporin (RESTASIS) 0.05 % ophthalmic emulsion 10/14/2022 Active   estradiol (ESTRACE) 0.1 MG/GM vaginal cream 10/13/2022 Active   Flaxseed, Linseed, (FLAX SEEDS PO) FEW DAYS AGO Active   folic acid (FOLVITE) 1 MG Tab 10/14/2022 Active   Glucosamine-Chondroitin-MSM (TRIPLE FLEX PO) FEW DAYS AGO Active   levothyroxine (SYNTHROID) 50 MCG Tab 10/14/2022 Active   liothyronine (CYTOMEL) 5 MCG Tab 10/14/2022 Active   LUTEIN-ZEAXANTHIN PO FEW DAYS AGO Active   methotrexate 2.5 MG Tab 10/10/2022 Active   Nutritional Supplements (GRAPESEED EXTRACT PO) FEW DAYS AGO Active   Probiotic Product (PROBIOTIC DAILY PO) 10/13/2022 Active   simvastatin (ZOCOR) 10 MG Tab 10/14/2022 Active   TURMERIC PO FEW DAYS AGO Active                  Current Facility-Administered Medications   Medication Dose Route Frequency Provider Last Rate Last Admin    rivaroxaban (XARELTO) tablet 15 mg  15 mg Oral BID WITH MEALS Abdulkadir Sanabria M.D.        Followed by    [START ON 11/10/2022] rivaroxaban (XARELTO) tablet 20 mg  20 mg Oral PM MEAL Abdulkadir Sanabria M.D.        furosemide (LASIX) injection 40 mg  40 mg Intravenous BID DIURETIC Abdulkadir Sanabria M.D.   40 mg at 10/20/22 1521    benzonatate (TESSALON) capsule 100 mg  100 mg Oral TID PRN Abdulkadir Sanabria M.D.        LORazepam (ATIVAN) injection 0.5 mg  0.5 mg Intravenous Q5 MIN PRN Abdulkadir Sanabria M.D.   0.5 mg at 10/16/22 1449    methotrexate tablet 7.5 mg  7.5 mg Oral Q7 DAYS Abdulkadir Sanabria M.D.   7.5 mg at 10/17/22 0846    folic acid (FOLVITE) tablet 1 mg   1 mg Oral Once per day on Tue Wed Thu Fri Sat Abdulkadir Sanabria M.D.   1 mg at 10/20/22 0449    methotrexate tablet 7.5 mg  7.5 mg Oral Q7 DAYS Tyree Larose D.O.   7.5 mg at 10/16/22 2302    senna-docusate (PERICOLACE or SENOKOT S) 8.6-50 MG per tablet 2 Tablet  2 Tablet Oral BID Lorrie Juárez D.O.   2 Tablet at 10/20/22 1714    And    polyethylene glycol/lytes (MIRALAX) PACKET 1 Packet  1 Packet Oral QDAY PRN JAN DeckerO.   1 Packet at 10/16/22 0547    And    magnesium hydroxide (MILK OF MAGNESIA) suspension 30 mL  30 mL Oral QDAY PRN JAN DeckerOMango        And    bisacodyl (DULCOLAX) suppository 10 mg  10 mg Rectal QDAY PRN JAN DeckerO.        ondansetron (ZOFRAN) syringe/vial injection 4 mg  4 mg Intravenous Q4HRS PRN JAN DeckerO.   4 mg at 10/15/22 1727    ondansetron (ZOFRAN ODT) dispertab 4 mg  4 mg Oral Q4HRS PRN JAN DeckerO.        labetalol (NORMODYNE/TRANDATE) injection 10 mg  10 mg Intravenous Q4HRS PRN JAN DeckerO.        Pharmacy Consult Request ...Pain Management Review 1 Each  1 Each Other PHARMACY TO DOSE Lorrie Juárez D.O.        oxyCODONE immediate-release (ROXICODONE) tablet 5 mg  5 mg Oral Q3HRS PRN JAN DeckerOMango        Or    oxyCODONE immediate release (ROXICODONE) tablet 10 mg  10 mg Oral Q3HRS PRN JAN DeckerO.   10 mg at 10/15/22 1605    Or    HYDROmorphone (Dilaudid) injection 0.5 mg  0.5 mg Intravenous Q3HRS PRN JAN DeckerO.   0.5 mg at 10/15/22 1727    levothyroxine (SYNTHROID) tablet 50 mcg  50 mcg Oral AM ES Lorrie Juárez D.O.   50 mcg at 10/20/22 0449    liothyronine (CYTOMEL) tablet 2.5 mcg  2.5 mcg Oral DAILY Lorrie Juárez D.O.   2.5 mcg at 10/20/22 0449    carboxymethylcellulose (REFRESH TEARS) 0.5 % ophthalmic drops 1-2 Drop  1-2 Drop Both Eyes Q2HRS PRN Lorrie Juárez D.O.           Allergies  Allergies   Allergen Reactions    Other Food Anaphylaxis and Hives     Strawberry HIVES  Yellow  wallace (ANAPHYLAXIS)      Codeine Vomiting and Nausea    Hydrocodone-Acetaminophen Nausea     nausea (Vicodin) Allergy conversion clean-up. Please validate at next visit.; **PCX CONVERTED DATA**       Vital Signs last 24 hours  Temp:  [36.3 °C (97.4 °F)-37.1 °C (98.7 °F)] 37.1 °C (98.7 °F)  Pulse:  [82-84] 83  Resp:  [18] 18  BP: (100-117)/(62-68) 110/68  SpO2:  [90 %-96 %] 96 %    Physical Exam  Physical Exam  Constitutional:       Appearance: Normal appearance.   HENT:      Head: Normocephalic and atraumatic.      Mouth/Throat:      Mouth: Mucous membranes are moist.   Eyes:      Extraocular Movements: Extraocular movements intact.      Pupils: Pupils are equal, round, and reactive to light.   Cardiovascular:      Rate and Rhythm: Normal rate.   Pulmonary:      Effort: Pulmonary effort is normal.      Comments: Def. Diminished BS lower 1/2 of left post lung  Abdominal:      General: Bowel sounds are normal. There is distension.      Tenderness: There is no abdominal tenderness.   Musculoskeletal:      Cervical back: Normal range of motion.      Comments: Rue swelling which pt explained is infiltrated IV   Skin:     General: Skin is warm and dry.   Neurological:      General: No focal deficit present.      Mental Status: She is alert and oriented to person, place, and time.   Psychiatric:         Mood and Affect: Mood normal.         Thought Content: Thought content normal.         Judgment: Judgment normal.       Fluids    Intake/Output Summary (Last 24 hours) at 10/20/2022 1935  Last data filed at 10/20/2022 1810  Gross per 24 hour   Intake 1280 ml   Output 3600 ml   Net -2320 ml       Laboratory  Recent Results (from the past 48 hour(s))   Basic Metabolic Panel    Collection Time: 10/19/22  1:49 AM   Result Value Ref Range    Sodium 137 135 - 145 mmol/L    Potassium 4.0 3.6 - 5.5 mmol/L    Chloride 99 96 - 112 mmol/L    Co2 27 20 - 33 mmol/L    Glucose 115 (H) 65 - 99 mg/dL    Bun 12 8 - 22 mg/dL    Creatinine  0.71 0.50 - 1.40 mg/dL    Calcium 8.3 (L) 8.4 - 10.2 mg/dL    Anion Gap 11.0 7.0 - 16.0   CBC WITHOUT DIFFERENTIAL    Collection Time: 10/19/22  1:49 AM   Result Value Ref Range    WBC 11.3 (H) 4.8 - 10.8 K/uL    RBC 3.51 (L) 4.20 - 5.40 M/uL    Hemoglobin 10.5 (L) 12.0 - 16.0 g/dL    Hematocrit 32.7 (L) 37.0 - 47.0 %    MCV 93.2 81.4 - 97.8 fL    MCH 29.9 27.0 - 33.0 pg    MCHC 32.1 (L) 33.6 - 35.0 g/dL    RDW 49.1 35.9 - 50.0 fL    Platelet Count 278 164 - 446 K/uL    MPV 9.8 9.0 - 12.9 fL   ESTIMATED GFR    Collection Time: 10/19/22  1:49 AM   Result Value Ref Range    GFR (CKD-EPI) 90 >60 mL/min/1.73 m 2   Basic Metabolic Panel    Collection Time: 10/20/22  3:23 AM   Result Value Ref Range    Sodium 136 135 - 145 mmol/L    Potassium 3.0 (L) 3.6 - 5.5 mmol/L    Chloride 96 96 - 112 mmol/L    Co2 28 20 - 33 mmol/L    Glucose 114 (H) 65 - 99 mg/dL    Bun 9 8 - 22 mg/dL    Creatinine 0.56 0.50 - 1.40 mg/dL    Calcium 8.0 (L) 8.4 - 10.2 mg/dL    Anion Gap 12.0 7.0 - 16.0   CBC WITHOUT DIFFERENTIAL    Collection Time: 10/20/22  3:23 AM   Result Value Ref Range    WBC 8.1 4.8 - 10.8 K/uL    RBC 3.27 (L) 4.20 - 5.40 M/uL    Hemoglobin 9.9 (L) 12.0 - 16.0 g/dL    Hematocrit 30.0 (L) 37.0 - 47.0 %    MCV 91.7 81.4 - 97.8 fL    MCH 30.3 27.0 - 33.0 pg    MCHC 33.0 (L) 33.6 - 35.0 g/dL    RDW 48.8 35.9 - 50.0 fL    Platelet Count 287 164 - 446 K/uL    MPV 10.1 9.0 - 12.9 fL   ESTIMATED GFR    Collection Time: 10/20/22  3:23 AM   Result Value Ref Range    GFR (CKD-EPI) 97 >60 mL/min/1.73 m 2   PROCALCITONIN    Collection Time: 10/20/22  3:23 AM   Result Value Ref Range    Procalcitonin 0.06 <0.25 ng/mL   MAGNESIUM    Collection Time: 10/20/22  3:23 AM   Result Value Ref Range    Magnesium 1.6 1.5 - 2.5 mg/dL   CBC WITHOUT DIFFERENTIAL    Collection Time: 10/20/22 12:57 PM   Result Value Ref Range    WBC 9.5 4.8 - 10.8 K/uL    RBC 3.63 (L) 4.20 - 5.40 M/uL    Hemoglobin 11.0 (L) 12.0 - 16.0 g/dL    Hematocrit 33.3 (L) 37.0 -  47.0 %    MCV 91.7 81.4 - 97.8 fL    MCH 30.3 27.0 - 33.0 pg    MCHC 33.0 (L) 33.6 - 35.0 g/dL    RDW 47.8 35.9 - 50.0 fL    Platelet Count 340 164 - 446 K/uL    MPV 10.0 9.0 - 12.9 fL       Imaging  See above   Personally viewed    Assessment/Plan  Acute respiratory failure with hypoxemia due to volume overload (HCC)  Assessment & Plan  Fluid overload and the pleural effusions is due to pancreatitis and ressucitation  Pt has improved and diureses  Small left pleural effusion  Down to 1 l NC  Reviewed with pt at this time no intervention as she is improving  Recommend be discharged with remote pt monitoring (RPM)  Currently sats drop to 85% on RA unless she takes deep breaths suggesting atelectasis and shallow breathing  This should improve with time of recovery and ambulation  On RPM pulm team will be able to follow her      Discussed patient with DR. Sanabria

## 2022-10-20 NOTE — CARE PLAN
The patient is Stable - Low risk of patient condition declining or worsening    Shift Goals  Clinical Goals: Pt will be free from falls this shift  Patient Goals: Pt will wean off O2 this shift  Family Goals: N/A    Progress made toward(s) clinical / shift goals:        Patient is not progressing towards the following goals:      Problem: Respiratory  Goal: Patient will achieve/maintain optimum respiratory ventilation and gas exchange  Outcome: Not Progressing

## 2022-10-20 NOTE — DISCHARGE PLANNING
Case Management Discharge Planning    Admission Date: 10/14/2022  GMLOS: 4.7  ALOS: 6    6-Clicks ADL Score: 24  6-Clicks Mobility Score: 21      Anticipated Discharge Dispo: Discharge Disposition: D/T to home under [facility] care in anticipation of covered skilled care (06)    DME Needed: No    Action(s) Taken: Spoke to pt and pt's spouse at bedside. Received verbal approval for HH with [facility]. Choice form completed and faxed to [facility].    Escalations Completed: None    Medically Clear: No    Next Steps: f/u with [facility] regarding HH acceptance    Barriers to Discharge: Medical clearance

## 2022-10-20 NOTE — CARE PLAN
The patient is Stable - Low risk of patient condition declining or worsening    Shift Goals  Clinical Goals: Free from falls or injuries, rest and sleep for at least 4 hours, pain controlled 3/10  Patient Goals: Free from falls or injuries, rest and sleep for at least 4 hours, pain controlled 3/10  Family Goals: N/A    Progress made toward(s) clinical / shift goals:  No falls or injuries, pain controlled 3/10 or lower    Patient is not progressing towards the following goals:

## 2022-10-20 NOTE — ASSESSMENT & PLAN NOTE
- Suspect superficial venous thrombosis from peripheral IVs, but will obtain a venous duplex ultrasound of the left lower extremity to rule out DVT.  -Continue diuretics.

## 2022-10-20 NOTE — ASSESSMENT & PLAN NOTE
- Likely due to diuresis.  Magnesium 1.6.  - Replace with 60 mEq of oral K-Dur.  I will also give her 2 g of IV magnesium sulfate to replenish her magnesium stores.  -Repeat BMP and magnesium levels in the morning.

## 2022-10-20 NOTE — FACE TO FACE
Face to Face Supporting Documentation - Home Health    The encounter with this patient was in whole or in part the primary reason for home health admission.    Date of encounter:   Patient:                    MRN:                       YOB: 2022  Miranda Rose  [id number]  1950     Home health to see patient for:  Skilled Nursing care for assessment, interventions & education, Home health aide, Physical Therapy evaluation and treatment, and Occupational therapy evaluation and treatment    Skilled need for:  New Onset Medical Diagnosis Acute pancreatitis    Skilled nursing interventions to include:  Comment:   Skilled nursing care for assessment, intervention and education.       Homebound status evidenced by:  Needs the assistance of another person in order to leave the home. Leaving home requires a considerable and taxing effort. There is a normal inability to leave the home.    Community Physician to provide follow up care: Juvencio Thomason D.O.     Optional Interventions? No      I certify the face to face encounter for this home health care referral meets the CMS requirements and the encounter/clinical assessment with the patient was, in whole, or in part, for the medical condition(s) listed above, which is the primary reason for home health care. Based on my clinical findings: the service(s) are medically necessary, support the need for home health care, and the homebound criteria are met.  I certify that this patient has had a face to face encounter by myself.  Abdulkadir Sanabria M.D. - NPI: [id number]

## 2022-10-20 NOTE — PROGRESS NOTES
Received bedside patient report from [facility] Jo. Patient resting comfortably in bed, no complaints at this time. Safety precautions in place. Will continue to monitor.

## 2022-10-21 ENCOUNTER — APPOINTMENT (OUTPATIENT)
Dept: RADIOLOGY | Facility: MEDICAL CENTER | Age: 72
DRG: 438 | End: 2022-10-21
Attending: INTERNAL MEDICINE
Payer: MEDICARE

## 2022-10-21 ENCOUNTER — TELEPHONE (OUTPATIENT)
Dept: OTHER | Facility: MEDICAL CENTER | Age: 72
End: 2022-10-21
Payer: MEDICARE

## 2022-10-21 VITALS
HEIGHT: 65 IN | TEMPERATURE: 97.6 F | HEART RATE: 85 BPM | SYSTOLIC BLOOD PRESSURE: 100 MMHG | DIASTOLIC BLOOD PRESSURE: 68 MMHG | OXYGEN SATURATION: 93 % | RESPIRATION RATE: 18 BRPM | BODY MASS INDEX: 20.75 KG/M2 | WEIGHT: 124.56 LBS

## 2022-10-21 VITALS — RESPIRATION RATE: 17 BRPM | OXYGEN SATURATION: 94 % | HEART RATE: 87 BPM

## 2022-10-21 LAB
ANION GAP SERPL CALC-SCNC: 9 MMOL/L (ref 7–16)
BUN SERPL-MCNC: 10 MG/DL (ref 8–22)
CALCIUM SERPL-MCNC: 8.7 MG/DL (ref 8.4–10.2)
CHLORIDE SERPL-SCNC: 97 MMOL/L (ref 96–112)
CO2 SERPL-SCNC: 33 MMOL/L (ref 20–33)
CREAT SERPL-MCNC: 0.57 MG/DL (ref 0.5–1.4)
ERYTHROCYTE [DISTWIDTH] IN BLOOD BY AUTOMATED COUNT: 47.6 FL (ref 35.9–50)
GFR SERPLBLD CREATININE-BSD FMLA CKD-EPI: 96 ML/MIN/1.73 M 2
GLUCOSE SERPL-MCNC: 119 MG/DL (ref 65–99)
HCT VFR BLD AUTO: 30.7 % (ref 37–47)
HGB BLD-MCNC: 10.1 G/DL (ref 12–16)
MAGNESIUM SERPL-MCNC: 1.6 MG/DL (ref 1.5–2.5)
MCH RBC QN AUTO: 30.2 PG (ref 27–33)
MCHC RBC AUTO-ENTMCNC: 32.9 G/DL (ref 33.6–35)
MCV RBC AUTO: 91.9 FL (ref 81.4–97.8)
PLATELET # BLD AUTO: 317 K/UL (ref 164–446)
PMV BLD AUTO: 10.1 FL (ref 9–12.9)
POTASSIUM SERPL-SCNC: 2.9 MMOL/L (ref 3.6–5.5)
RBC # BLD AUTO: 3.34 M/UL (ref 4.2–5.4)
SODIUM SERPL-SCNC: 139 MMOL/L (ref 135–145)
WBC # BLD AUTO: 6.5 K/UL (ref 4.8–10.8)

## 2022-10-21 PROCEDURE — 99453 REM MNTR PHYSIOL PARAM SETUP: CPT

## 2022-10-21 PROCEDURE — 700102 HCHG RX REV CODE 250 W/ 637 OVERRIDE(OP): Performed by: INTERNAL MEDICINE

## 2022-10-21 PROCEDURE — 85027 COMPLETE CBC AUTOMATED: CPT

## 2022-10-21 PROCEDURE — A9270 NON-COVERED ITEM OR SERVICE: HCPCS | Performed by: INTERNAL MEDICINE

## 2022-10-21 PROCEDURE — 83735 ASSAY OF MAGNESIUM: CPT

## 2022-10-21 PROCEDURE — 80048 BASIC METABOLIC PNL TOTAL CA: CPT

## 2022-10-21 PROCEDURE — 700111 HCHG RX REV CODE 636 W/ 250 OVERRIDE (IP): Performed by: INTERNAL MEDICINE

## 2022-10-21 PROCEDURE — 99239 HOSP IP/OBS DSCHRG MGMT >30: CPT | Performed by: INTERNAL MEDICINE

## 2022-10-21 PROCEDURE — 36415 COLL VENOUS BLD VENIPUNCTURE: CPT

## 2022-10-21 PROCEDURE — 99454 REM MNTR PHYSIOL PARAM 16-30: CPT

## 2022-10-21 PROCEDURE — 71045 X-RAY EXAM CHEST 1 VIEW: CPT

## 2022-10-21 RX ORDER — BENZONATATE 100 MG/1
100 CAPSULE ORAL 3 TIMES DAILY PRN
Qty: 60 CAPSULE | Refills: 0 | Status: SHIPPED | OUTPATIENT
Start: 2022-10-21 | End: 2022-12-12

## 2022-10-21 RX ORDER — POTASSIUM CHLORIDE 20 MEQ/1
40 TABLET, EXTENDED RELEASE ORAL 2 TIMES DAILY
Status: DISCONTINUED | OUTPATIENT
Start: 2022-10-21 | End: 2022-10-21 | Stop reason: HOSPADM

## 2022-10-21 RX ORDER — ONDANSETRON 4 MG/1
4 TABLET, ORALLY DISINTEGRATING ORAL EVERY 4 HOURS PRN
Qty: 30 TABLET | Refills: 0 | Status: SHIPPED | OUTPATIENT
Start: 2022-10-21 | End: 2022-12-12

## 2022-10-21 RX ORDER — MAGNESIUM SULFATE HEPTAHYDRATE 40 MG/ML
2 INJECTION, SOLUTION INTRAVENOUS ONCE
Status: COMPLETED | OUTPATIENT
Start: 2022-10-21 | End: 2022-10-21

## 2022-10-21 RX ORDER — OXYCODONE HYDROCHLORIDE 5 MG/1
5 TABLET ORAL EVERY 6 HOURS PRN
Qty: 20 TABLET | Refills: 0 | Status: SHIPPED | OUTPATIENT
Start: 2022-10-21 | End: 2022-10-26

## 2022-10-21 RX ORDER — POTASSIUM CHLORIDE 20 MEQ/1
20 TABLET, EXTENDED RELEASE ORAL ONCE
Status: COMPLETED | OUTPATIENT
Start: 2022-10-21 | End: 2022-10-21

## 2022-10-21 RX ADMIN — FUROSEMIDE 40 MG: 10 INJECTION, SOLUTION INTRAMUSCULAR; INTRAVENOUS at 06:17

## 2022-10-21 RX ADMIN — FOLIC ACID 1 MG: 1 TABLET ORAL at 06:13

## 2022-10-21 RX ADMIN — SENNOSIDES AND DOCUSATE SODIUM 2 TABLET: 50; 8.6 TABLET ORAL at 06:12

## 2022-10-21 RX ADMIN — LIOTHYRONINE SODIUM 2.5 MCG: 5 TABLET ORAL at 06:11

## 2022-10-21 RX ADMIN — FUROSEMIDE 40 MG: 10 INJECTION, SOLUTION INTRAMUSCULAR; INTRAVENOUS at 15:30

## 2022-10-21 RX ADMIN — RIVAROXABAN 15 MG: 15 TABLET, FILM COATED ORAL at 06:13

## 2022-10-21 RX ADMIN — POTASSIUM CHLORIDE 20 MEQ: 1500 TABLET, EXTENDED RELEASE ORAL at 07:28

## 2022-10-21 RX ADMIN — MAGNESIUM SULFATE 2 G: 2 INJECTION INTRAVENOUS at 07:26

## 2022-10-21 RX ADMIN — POTASSIUM CHLORIDE 40 MEQ: 1500 TABLET, EXTENDED RELEASE ORAL at 07:28

## 2022-10-21 RX ADMIN — LEVOTHYROXINE SODIUM 50 MCG: 50 TABLET ORAL at 06:11

## 2022-10-21 ASSESSMENT — PAIN DESCRIPTION - PAIN TYPE: TYPE: ACUTE PAIN

## 2022-10-21 NOTE — FACE TO FACE
"Face to Face Note  -  Durable Medical Equipment    Abdulkadir Sanabria M.D. - NPI: [id number]  I certify that this patient is under my care and that they had a durable medical equipment(DME)face to face encounter by myself that meets the physician DME face-to-face encounter requirements with this patient on:    Date of encounter:   Patient:                    MRN:                       YOB: 2022  Miranda Rose  [id number]  1950     The encounter with the patient was in whole, or in part, for the following medical condition, which is the primary reason for durable medical equipment:  Other - Acute respiratory failure with hypoxia due to volume overload, and pancreatitis    I certify that, based on my findings, the following durable medical equipment is medically necessary:    Oxygen   HOME O2 Saturation Measurements:(Values must be present for Home Oxygen orders)  Room air sat at rest: 86  Room air sat with amb: 86  With liters of O2: 1, O2 sat at rest with O2: 93  With Liters of O2: 1, O2 sat with amb with O2 : 94  Is the patient mobile?: Yes  If patient feels more short of breath, they can go up to 6 liters per minute and contact healthcare provider.    Supporting Symptoms: The patient requires supplemental oxygen, as the following interventions have been tried with limited or no improvement: \"Ambulation with oximetry and \"Incentive spirometry.    My Clinical findings support the need for the above equipment due to:  Hypoxia  "

## 2022-10-21 NOTE — CARE PLAN
The patient is Stable - Low risk of patient condition declining or worsening    Shift Goals  Clinical Goals: Pt's O2 saturation will remain at or > 90% with supplemental O2 during this shift  Patient Goals: PT will able to sleep comfortably 5 hours or more overnight  Family Goals: n/a    Progress made toward(s) clinical / shift goals:  Pt was able to maintain above 90% O2 saturation via 1L NC overnight. Pt did not report any pain or N/V. Pt did not fall during this shift. Pt was able to sleep more than 5 hours throughout the night.       Patient is not progressing towards the following goals: n/a

## 2022-10-21 NOTE — DISCHARGE PLANNING
Case Management Discharge Planning    Admission Date: 10/14/2022  GMLOS: 4.7  ALOS: 7    6-Clicks ADL Score: 24  6-Clicks Mobility Score: 21      Anticipated Discharge Dispo: Discharge Disposition: D/T to home under A care in anticipation of covered skilled care (06)    DME Needed: Yes    DME Ordered: Yes    Action(s) Taken: Spoke to Cassidy from [facility]. Per Cassidy, pt is accepted.    Spoke to Geeta at [facility] pharmacy. Per Geeta, total gil for both Xarelto prescriptions is $73.33    Spoke to pt at bedside. Received verbal approval for O2 from Luke. Choice form completed and faxed to [facility].    1510: Per [facility] Espinal RN, pt's O2 delivered to bedside.    Escalations Completed: None    Medically Clear: No    Next Steps: f/u with MD regarding O2 order    Barriers to Discharge: Medical clearance, likely O2

## 2022-10-21 NOTE — DISCHARGE PLANNING
Received Choice form at 1256  Agency/Facility Name: [facility]  Referral sent per Choice form @ 1305     Agency/Facility Name: [facility]  Spoke To: Taco  Outcome: Per Taco, pt's O2 order was delivered to pt's bedside.  RN MEGHA notified

## 2022-10-21 NOTE — TELEPHONE ENCOUNTER
[facility].   At 1604 Pt is currently still inpatient at [facility] in room 2214/01. Will follow inpatient work flow sheet until pt discharges home.

## 2022-10-21 NOTE — DISCHARGE SUMMARY
Discharge Summary    CHIEF COMPLAINT ON ADMISSION  Chief Complaint   Patient presents with    Abdominal Pain     RUQ       Reason for Admission  EMS     Admission Date  10/14/2022    CODE STATUS  Full Code    HPI & HOSPITAL COURSE  Miranda Rose is a 72 y.o. female with anxiety, hyperlipidemia, hypothyroidism, chronic diarrhea, history of EBV, and recent diagnosis of scleroderma on weekly methotrexate due to folic acid, admitted 10/14/2022 with acute onset upper abdominal pain, radiating to the back, with associated nausea.  Bowel movements remained unchanged.  She denies alcohol use since being diagnosed with scleroderma, but reports taking 1 glass of wine weekly.  Evaluation showed significant elevated lipase of greater than 3000, triglycerides 52.  WBC count, electrolytes and renal function are normal.  LFTs are normal.  She had a negative right upper quadrant ultrasound.  CT of the abdomen showed findings consistent with severe acute pancreatitis, with edema seen about the pancreas extending into the mesenteric root and caroline hepatis, and extending into the anterior pararenal spaces bilaterally. She was placed on aggressive IV fluid hydration, analgesics, and bowel rest.  She shared that she has strong family history of bile duct and gallbladder problems, prompting an MRCP which showed no choledocholithiasis or biliary dilatation, no cholelithiasis with moderately distended gallbladder, with severe interstitial edematous pancreatitis with no drainable organized pancreatic or peripancreatic fluid collection, with new moderate right and small left pleural effusions related to pancreatitis, and moderate volume ascites, retroperitoneal edema, and mild anasarca also related to pancreatitis.  IgG4 antibodies level was normal.      She clinically improved.  Her lipase has significantly improved and has subsequently normalized. Repeat CT scan of the abdomen which showed improvement in pancreatic inflammation with  no focal fluid collection.  Her WBC count has normalized.  Her diet was advanced to full liquids, and subsequently GI soft diet which she tolerated well.  However, she showed signs of volume overload, with chest x-ray showing pulmonary edema, and moderate left pleural effusion and hypoxemia. Her IV fluids were discontinued and she is started on IV Lasix with which she diuresed well to the point of euvolemia.  She had low potassium and magnesium levels with diuresis, which were replaced.  Her respiratory status improved, with improvement in the size of the pleural effusions, and degree of pulmonary edema on repeat chest radiographs.  She did have new onset swelling on the left arm, and she was found to have left arm DVT for which she is started on Xarelto.  She was able to be weaned down on oxygen, although noted to have desaturations both at rest and with exertion, requiring 1 L of oxygen by nasal cannula.  Pulmonology was consulted, who recommended that she may be discharged on home oxygen supplement and remote patient monitoring.  Both were arranged.    She was evaluated by PT/OT, and home health services were arranged.    I have personally seen and examined the patient on the day of discharge. With her clinical improvement, she was deemed ready to discharge from the hospital as she did not have any further hospitalization needs. Patient felt comfortable going home. The discharge plan was discussed with the patient, with which she was agreeable to.     Therefore, she is discharged in good and stable condition to home with organized home healthcare and close outpatient follow-up.    The patient met 2-midnight criteria for an inpatient stay at the time of discharge.    Discharge Date  10/21/2022      FOLLOW UP ITEMS POST DISCHARGE  -She will continue on home oxygen supplement, with remote patient monitoring.  -She will continue on Xarelto for her left arm DVT.  She is advised to elevate her arm.  -She will follow-up  with her PCP.  As cause of appetite this was unclear, consider outpatient referral to gastroenterologist.  -She will get home PT/OT with home health services.  - counseled to seek immediate medical attention, or return to the ED for recurrent or worsening symptoms.      DISCHARGE DIAGNOSES  Principal Problem:    Severe acute pancreatitis POA: Yes  Active Problems:    Acute respiratory failure with hypoxemia due to volume overload (HCC) POA: No    Hyponatremia POA: Yes    Hypokalemia POA: No    Edema of left upper extremity POA: No    Hyperlipidemia POA: Yes    Hypothyroid POA: Yes    Scleroderma (HCC) POA: Yes  Resolved Problems:    * No resolved hospital problems. *      FOLLOW UP  No future appointments.  No follow-up provider specified.    MEDICATIONS ON DISCHARGE     Medication List        START taking these medications        Instructions   benzonatate 100 MG Caps  Commonly known as: TESSALON   Take 1 Capsule by mouth 3 times a day as needed for Cough.  Dose: 100 mg     ondansetron 4 MG Tbdp  Commonly known as: ZOFRAN ODT   Take 1 Tablet by mouth every four hours as needed for Nausea.  Dose: 4 mg     oxyCODONE immediate-release 5 MG Tabs  Commonly known as: ROXICODONE   Take 1 Tablet by mouth every 6 hours as needed for Severe Pain for up to 5 days.  Dose: 5 mg     * rivaroxaban 15 MG Tabs tablet  Commonly known as: XARELTO   Take 1 Tablet by mouth 2 times a day with meals for 20 days.  Dose: 15 mg     * rivaroxaban 20 MG Tabs tablet  Start taking on: November 10, 2022  Commonly known as: XARELTO   Take 1 Tablet by mouth with dinner.  Dose: 20 mg           * This list has 2 medication(s) that are the same as other medications prescribed for you. Read the directions carefully, and ask your doctor or other care provider to review them with you.                CONTINUE taking these medications        Instructions   5-HTP PO   Take 1 Tab by mouth every day.  Dose: 1 Tablet     CO Q 10 PO   Take 1 Tablet by mouth  every day.  Dose: 1 Tablet     CRANBERRY PO   Take 1 Tablet by mouth every day.  Dose: 1 Tablet     cyclosporine 0.05 % ophthalmic emulsion  Commonly known as: RESTASIS   Administer 1 Drop into both eyes 2 times a day.  Dose: 1 Drop     KYARA C PO   Take 1 Tablet by mouth every day.  Dose: 1 Tablet     estradiol 0.1 MG/GM vaginal cream  Commonly known as: ESTRACE   Insert 1 g into the vagina see administration instructions. Uses every 4 days  Dose: 1 g     FLAX SEEDS PO   Take 1 Tablet by mouth every day.  Dose: 1 Tablet     folic acid 1 MG Tabs  Commonly known as: FOLVITE   Take 1 mg by mouth see administration instructions. Takes on Tuesday, Wednesday, Thursday, Friday, and Saturday  Dose: 1 mg     GRAPESEED EXTRACT PO   Take 1 Tablet by mouth every day.  Dose: 1 Tablet     levothyroxine 50 MCG Tabs  Commonly known as: SYNTHROID   Take 50 mcg by mouth every morning on an empty stomach.  Dose: 50 mcg     liothyronine 5 MCG Tabs  Commonly known as: CYTOMEL   Take 2.5 mcg by mouth every day.  Dose: 2.5 mcg     LUTEIN-ZEAXANTHIN PO   Take 1 Tab by mouth every day.  Dose: 1 Tablet     methotrexate 2.5 MG Tabs   Take 7.5 mg by mouth see administration instructions. Take 7.5 mg on Sunday night   Take 7.5 mg on Monday morning  Dose: 7.5 mg     PROBIOTIC DAILY PO   Take 1 Cap by mouth every day.  Dose: 1 Capsule     simvastatin 10 MG Tabs  Commonly known as: ZOCOR   Take 10 mg by mouth every day.  Dose: 10 mg     TRIPLE FLEX PO   Take 1 Tablet by mouth every day.  Dose: 1 Tablet     TURMERIC PO   Take 1 Tablet by mouth every day.  Dose: 1 Tablet     Vitamin D3 2000 UNIT Caps   Take 2,000 Units by mouth every evening.  Dose: 2,000 Units              Allergies  Allergies   Allergen Reactions    Other Food Anaphylaxis and Hives     Strawberry HIVES  Yellow wallace (ANAPHYLAXIS)      Codeine Vomiting and Nausea    Hydrocodone-Acetaminophen Nausea     nausea (Vicodin) Allergy conversion clean-up. Please validate at next visit.;  **PCX CONVERTED DATA**       DIET  Orders Placed This Encounter   Procedures    Diet Order Diet: Low Fiber(GI Soft)     Standing Status:   Standing     Number of Occurrences:   1     Order Specific Question:   Diet:     Answer:   Low Fiber(GI Soft) [2]       ACTIVITY  As tolerated.  Weight bearing as tolerated    CONSULTATIONS  Pulmonology    PROCEDURES  As above    LABORATORY  Lab Results   Component Value Date    SODIUM 139 10/21/2022    POTASSIUM 2.9 (L) 10/21/2022    CHLORIDE 97 10/21/2022    CO2 33 10/21/2022    GLUCOSE 119 (H) 10/21/2022    BUN 10 10/21/2022    CREATININE 0.57 10/21/2022        Lab Results   Component Value Date    WBC 6.5 10/21/2022    HEMOGLOBIN 10.1 (L) 10/21/2022    HEMATOCRIT 30.7 (L) 10/21/2022    PLATELETCT 317 10/21/2022        Total time of the discharge process = 45 minutes.

## 2022-10-21 NOTE — PROGRESS NOTES
Received pt's bedside report from day RN. Assumed pt care at 19:15. Pt is awake, lying on bed comfortably, not in distress. AxO4. Pt complaints 4/10  back pain but decline medication. Pt denies any N/V. Pt is on 1.5L via nasal cannula. Discussed plan of care. Answered all questions. Bed on lowest position, call light within reach. Continue to monitor.

## 2022-10-21 NOTE — DISCHARGE INSTRUCTIONS
Acute Pancreatitis    Acute pancreatitis happens when the pancreas gets swollen. The pancreas is a large gland in the body that helps to control blood sugar. It also makes enzymes that help to digest food.  This condition can last a few days and cause serious problems. The lungs, heart, and kidneys may stop working.  What are the causes?  Causes include:  Alcohol abuse.  Drug abuse.  Gallstones.  A tumor in the pancreas.  Other causes include:  Some medicines.  Some chemicals.  Diabetes.  An infection.  Damage caused by an accident.  The poison (venom) from a scorpion bite.  Belly (abdominal) surgery.  The body's defense system (immune system) attacking the pancreas (autoimmune pancreatitis).  Genes that are passed from parent to child (inherited).  In some cases, the cause is not known.  What are the signs or symptoms?  Pain in the upper belly that may be felt in the back. The pain may be very bad.  Swelling of the belly.  Feeling sick to your stomach (nauseous) and throwing up (vomiting).  Fever.  How is this treated?  You will likely have to stay in the hospital. Treatment may include:  Pain medicine.  Fluid through an IV tube.  Placing a tube in the stomach to take out the stomach contents. This may help you stop throwing up.  Not eating for 3-4 days.  Antibiotic medicines, if you have an infection.  Treating any other problems that may be the cause.  Steroid medicines, if your problem is caused by your defense system attacking your body's own tissues.  Surgery.  Follow these instructions at home:  Eating and drinking    Follow instructions from your doctor about what to eat and drink.  Eat foods that do not have a lot of fat in them.  Eat small meals often. Do not eat big meals.  Drink enough fluid to keep your pee (urine) pale yellow.  Do not drink alcohol if it caused your condition.  Medicines  Take over-the-counter and prescription medicines only as told by your doctor.  Ask your doctor if the medicine  prescribed to you:  Requires you to avoid driving or using heavy machinery.  Can cause trouble pooping (constipation). You may need to take steps to prevent or treat trouble pooping:  Take over-the-counter or prescription medicines.  Eat foods that are high in fiber. These include beans, whole grains, and fresh fruits and vegetables.  Limit foods that are high in fat and sugar. These include fried or sweet foods.  General instructions  Do not use any products that contain nicotine or tobacco, such as cigarettes, e-cigarettes, and chewing tobacco. If you need help quitting, ask your doctor.  Get plenty of rest.  Check your blood sugar at home as told by your doctor.  Keep all follow-up visits as told by your doctor. This is important.  Contact a doctor if:  You do not get better as quickly as expected.  You have new symptoms.  Your symptoms get worse.  You have pain or weakness that lasts a long time.  You keep feeling sick to your stomach.  You get better and then you have pain again.  You have a fever.  Get help right away if:  You cannot eat or keep fluids down.  Your pain gets very bad.  Your skin or the white part of your eyes turns yellow.  You have sudden swelling in your belly.  You throw up.  You feel dizzy or you pass out (faint).  Your blood sugar is high (over 300 mg/dL).  Summary  Acute pancreatitis happens when the pancreas gets swollen.  This condition is often caused by alcohol abuse, drug abuse, or gallstones.  You will likely have to stay in the hospital for treatment.  This information is not intended to replace advice given to you by your health care provider. Make sure you discuss any questions you have with your health care provider.  Document Released: 06/05/2009 Document Revised: 10/07/2019 Document Reviewed: 10/07/2019  [employer] Patient Education © 2020 [employer]    Deep Vein Thrombosis Discharge Instructions    A deep vein thrombosis (DVT) is a blood clot (thrombus) that develops in a  deep vein. A DVT is a clot in the deep, larger veins of the leg, arm, or pelvis. These are more dangerous than clots that might form in veins on the surface of the body. Deep vein thrombosis can lead to complications if the clot breaks off and travels in the bloodstream to the lungs.     CAUSES  Blood clots form in a vein for different reasons. Usually several things cause blood clots. They include:   The flow of blood slows down.   The inside of the vein is damaged in some way.   The person has a condition that makes blood clot more easily. These conditions may include:  Older age (especially over 75 years old).  Having a history of blood clots.  Having major or lengthy surgery. Hip surgery is particularly high-risk.   Breaking a hip or leg.  Sitting or lying still for a long time.  Cancer or cancer treatment.  Having a long, thin tube (catheter) placed inside a vein during a medical procedure.   Being overweight (obese).  Pregnancy and childbirth.  Medicines with estrogen.  Smoking.  Other circulation or heart problems.     SYMPTOMS  When a clot forms, it can either partially or totally block the blood flow in that vein. Symptoms of a DVT can include:  Swelling of the leg or arm, especially if one side is much worse.  Warmth and redness of the leg or arm, especially if one side is much worse.   Pain in an arm or leg. If the clot is in the leg, symptoms may be more noticeable or worse when standing or walking.  If the blood clot travels to the lung, it may cause:  Shortness of breath.  Chest pain. The pain may be worsened by deep breaths.   Coughing up thick mucus (phlegm), possibly flecked with blood.   Anyone with these symptoms should get emergency medical treatment right away. Call your local emergency  Services (911 in [place] if you have these symptoms.     DIAGNOSIS  If a DVT is suspected, your caregiver will take a full medical history. He or she will also perform a physical exam. Tests that also may be  required include:   Studies of the clotting properties of the blood.   An ultrasound scan.   X-rays to show the flow of blood when special dye is injected into the veins (venography).   Studies of your lungs if you have any chest symptoms.     PREVENTION  Exercise the legs regularly. Take a brisk 30 minute walk every day.   Maintain a weight that is appropriate for your height.  Avoid sitting or lying in bed for long periods of time without moving your legs.   Women, particularly those over the age of 35, should consider the risks and benefits of taking estrogen medicine, including birth control pills.   Do not smoke, especially if you take estrogen medicines.   Long-distance travel can increase your risk. You should exercise your legs by walking or pumping the muscles every hour.   In hospital prevention: Prevention may include medical and non medical measures.     TREATMENT  The most common treatment for DVT is blood thinning (anticoagulant) medicine, which reduces the blood's tendency to clot. Anticoagulants can stop new blood clots from forming and old ones from growing. They cannot dissolve existing clots. Your body does this by itself over time. Anticoagulants can be given by mouth, by intravenous (IV) access, or by injection. Your caregiver will determine the best program for you.   Less commonly, clot-dissolving drugs (thrombolytics) are used to dissolve a DVT. They carry a high risk of bleeding, so they are used mainly in severe cases.   Very rarely, a blood clot in the leg needs to be removed surgically.   If you are unable to take anticoagulants, your caregiver may arrange for you to have a filter placed in a main vein in your belly (abdomen). This filter prevents clots from traveling to your lungs.     HOME CARE INSTRUCTIONS  Take all medicines prescribed by your caregiver. Follow the directions carefully.   You will most likely continue taking anticoagulants after you leave the hospital. Your caregiver  will advise you on the length of treatment (usually 3 to 5 months, sometimes for life).   Taking too much or too little of an anticoagulant is dangerous. While taking this type of medicine, you will need to have regular blood tests to be sure the dose is correct. The dose can change for many reasons. It is critically important that you take this medicine exactly as prescribed, and that you have blood tests exactly as directed.   Many foods can interfere with anticoagulants. These include foods high in vitamin K, such as spinach, kale, broccoli, cabbage, josephine and turnip greens, [place] sprouts, peas, cauliflower, seaweed, parsley, beef and pork liver, green tea, and soybean oil. Your caregiver should discuss limits on these foods with you or you should arrange a visit with a dietician to answer your questions.   Many medicines can interfere with anticoagulants. You must tell your caregiver about any and all medicines you take. This includes all vitamins and supplements. Be especially cautious with aspirin and anti-inflammatory medicines. Ask your caregiver before taking these.   Anticoagulants can have side effects, mostly excessive bruising or bleeding. You will need to hold pressure over cuts for longer than usual. Avoid alcoholic drinks or consume only very small amounts while taking this medicine.    If you are taking an anticoagulant:  Wear a medical alert bracelet.  Notify your dentist or other caregivers before procedures.  Avoid contact sports.    Ask your caregiver how soon you can go back to normal activities. Not being active can lead to new clots. Ask for a list of what you should and should not do.   Exercise your lower leg muscles. This is important while traveling.   You may need to wear compression stockings. These are tight elastic stockings that apply pressure to the lower legs. This can help keep the blood in the legs from clotting.   If you are a smoker, you should quit.   Learn as much as you  can about DVT.     SEEK MEDICAL CARE IF:  You have unusual bruising or any bleeding problems.  The swelling or pain in your affected arm or leg is not gradually improving.   You anticipate surgery or long-distance travel. You should get specific advice on DVT prevention.   You discover other family members with blood clots. This may require further testing for inherited diseases or conditions.     SEEK IMMEDIATE MEDICAL CARE IF:  You develop chest pain.  You develop severe shortness of breath.  You begin to cough up bloody mucus or phlegm (sputum).  You feel dizzy or faint.   You develop swelling or pain in the leg.  You have breathing problems after traveling.    MAKE SURE YOU:  Understand these instructions.  Will watch your condition.  Will get help right away if you are not doing well or get worse.

## 2022-10-21 NOTE — ASSESSMENT & PLAN NOTE
Fluid overload and the pleural effusions is due to pancreatitis and ressucitation  Pt has improved and diureses  Small left pleural effusion  Down to 1 l NC  Reviewed with pt at this time no intervention as she is improving  Recommend be discharged with remote pt monitoring (RPM)  Currently sats drop to 85% on RA unless she takes deep breaths suggesting atelectasis and shallow breathing  This should improve with time of recovery and ambulation  On RPM pulm team will be able to follow her

## 2022-10-21 NOTE — CARE PLAN
The patient is Stable - Low risk of patient condition declining or worsening    Shift Goals  Clinical Goals: Keep O2 sat greater than 90% this shift  Patient Goals: Pt to discharge home today.  Family Goals: n/a    Progress made toward(s) clinical / shift goals:        Patient is not progressing towards the following goals:

## 2022-10-21 NOTE — RESPIRATORY CARE
REMOTE MONITORING PROGRAM by RESPIRATORY THERAPY  10/21/2022 at 4:07 PM by Felipa Gonsalez, RRT       Patient's name:  Miranda Rose        MRN: [id number]       : 1950  Physical address: [address] [place] [address]  Cell phone # [phone]  Current oxygen requirement 2  Ref # [id number] & Lot # [id number]  Emergency contact name & number: Walter Rose  [phone]  Primary language English  Resp. Failure      Patient agrees to Remote Monitoring program. Device instruction performed with welcome packet, consent signed and placed at bedside chart. Patient instructed on how to use MyChart and Zoom. No further questions at this time.

## 2022-10-22 NOTE — TELEPHONE ENCOUNTER
RPM Notification: Welcome Call  Actions: None    Total time (minutes) spent communicating with patient: 13    Conducted welcome call with patient. Went over important information of RPM, verified patient's contact information as well as emergency contact and address. Pt also informed of her upcoming appointments as well as answer all questions she and ec had.     Ph: [phone]  EC- Walter (): [phone]  Address: [address] [place] [address]  [id number]

## 2022-10-22 NOTE — TELEPHONE ENCOUNTER
RPM Notification: Disconnect  Actions: None    Total time (minutes) spent communicating with patient: 32    Patient became disconnected at 2130. Text sent out right away informing patient. The first attempts at troubleshooting were unsuccessful. Walked pt through on how to reset bluetooth but pt was still disconnected. I had patient replace the sensor and after sometime on the phone troubleshooting with patient, she was ble to finally reconnect. Vitals are stable, will continue to monitor.

## 2022-10-23 ENCOUNTER — TELEPHONE (OUTPATIENT)
Dept: OTHER | Facility: MEDICAL CENTER | Age: 72
End: 2022-10-23
Payer: MEDICARE

## 2022-10-23 NOTE — TELEPHONE ENCOUNTER
RPM Notification: Disconnect  Actions: None    Total time (minutes) spent communicating with patient: 30        Called Miranda to let her know she was disconnected from monitor. She restarted her blue tooth to see if that worked and it did not. I suggested she turn her phone off and then back on to see if that resets her. If it does not we will troubleshoot some more to get her reconnected.     I tried troubleshooting with patient for about 15 minutes and then I reached out to Anne as I was not able to get her reconnected. Anne is currently reaching out to patient to get her reconnected now.

## 2022-10-25 ENCOUNTER — TELEPHONE (OUTPATIENT)
Dept: OTHER | Facility: MEDICAL CENTER | Age: 72
End: 2022-10-25
Payer: MEDICARE

## 2022-10-25 NOTE — TELEPHONE ENCOUNTER
RPM Notification: Patient Communication  Actions: Device Troubleshoot    Total time (minutes) spent communicating with patient: 6    Patient sent a message asking for assistance changing out the battery on the device. I called her and was able to walk her through the process.

## 2022-10-25 NOTE — TELEPHONE ENCOUNTER
RPM Notification: Disconnect  Actions: None    Total time (minutes) spent communicating with patient: 2    Miranda is going to disconnect to take a shower and will reconnect when done.

## 2022-10-25 NOTE — TELEPHONE ENCOUNTER
RPM Notification: Patient Communication  Actions: None    Total time (minutes) spent communicating with patient: 5    Patient sent over a text message that she was all done and reconnected. She also stated that right after she got out of the shower she checked her oxygen saturation and she was at 82%. She sat there with oxygen on for a bit to bring it up and then went and dried her hair.    I did tell her that per our nurse and supervisor of the program that wearing her oxygen into the shower and leaving the tank outside the shower was okay and recommended if she is dropping that low.    Will continue to monitor patient and call her if she alerts or disconnects.

## 2022-10-26 ENCOUNTER — TELEPHONE (OUTPATIENT)
Dept: OTHER | Facility: MEDICAL CENTER | Age: 72
End: 2022-10-26
Payer: MEDICARE

## 2022-10-26 VITALS — HEART RATE: 101 BPM | OXYGEN SATURATION: 93 % | RESPIRATION RATE: 21 BRPM

## 2022-10-26 NOTE — TELEPHONE ENCOUNTER
RPM Notification: Patient Communication  Actions: None    Total time (minutes) spent communicating with patient: 1 min    Messaged Miranda via text, asking if it would be possible to move her appointment to, today.  Miranda asked if it was going to be with . We let her know it would be with Dr. Ramirez. Miranda, asked to please keep her original appointment with Dr. Nazario as she met her in the hospital. We let her know we would keep her appointment with Dr. Nazario. She thanked us and said she already has x2 appointments today and she believes she would be too tired to do anymore.

## 2022-10-27 ENCOUNTER — TELEPHONE (OUTPATIENT)
Dept: OTHER | Facility: MEDICAL CENTER | Age: 72
End: 2022-10-27
Payer: MEDICARE

## 2022-10-27 ENCOUNTER — TELEMEDICINE (OUTPATIENT)
Dept: SLEEP MEDICINE | Facility: MEDICAL CENTER | Age: 72
End: 2022-10-27
Payer: COMMERCIAL

## 2022-10-27 ENCOUNTER — TELEPHONE (OUTPATIENT)
Dept: OTHER | Facility: MEDICAL CENTER | Age: 72
End: 2022-10-27

## 2022-10-27 VITALS — RESPIRATION RATE: 23 BRPM | OXYGEN SATURATION: 94 % | HEART RATE: 107 BPM

## 2022-10-27 VITALS — HEART RATE: 83 BPM | OXYGEN SATURATION: 98 %

## 2022-10-27 DIAGNOSIS — J96.01 ACUTE RESPIRATORY FAILURE WITH HYPOXEMIA (HCC): ICD-10-CM

## 2022-10-27 PROCEDURE — 99999 PR NO CHARGE: CPT | Performed by: INTERNAL MEDICINE

## 2022-10-27 NOTE — TELEPHONE ENCOUNTER
RPM Notification: Disconnect, Reconnect, and Patient Communication  Actions: None    Total time (minutes) spent communicating with patient: 4 min    Called ingrid to let her know she was disconnected. Her light was good on her wrist. She checked to she if she was connected to bluetooth. She had to connect. She was still not able to see numbers on her side. We were also not able to see her numbers. I asked her to turn phone completely off and back on. Once on go to Curbside and open. She was able to get it reconnected by doing that.    She asked me a question about her appointment today with Dr. Nazario. She asked when the video chat was going to happen. I let her know that it will be a telephone call not a video chat. I also let her know that the MD will call when they have an open sport, to please have on. Ingrid ask that the appointment happens after 3pm. Her OT appointment is today at 1200 and she doesn't know how long it will be. She doesn't want MD to call while she is working with OT. RN aware an is reaching out to MD. Ingrid ask that we clarify that its not a video chart, as she stated , to;d her in the hospital it would be a video chat.  Ingrid ask that we text her the update.

## 2022-10-27 NOTE — PROGRESS NOTES
RPM Telemedicine: Established Patient   This evaluation was conducted via Zoom using secure and encrypted videoconferencing technology. The patient was physically located at  home  in [place], [place].   The patient's identity was confirmed and verbal consent was obtained for this telemedicine encounter.    Unfortunately Zoom link in [place] did not work so this was a telephone call only      Subjective:   CC:  RPM visit one       Miranda Rose is a 72 y.o. female presenting for evaluation and management of acute hypoxic respiratory failure secondary to pleural effusion from pancreatitis  72 y.o. female who presented 10/14/2022 with  anxiety, hyperlipidemia, hypothyroidism, chronic diarrhea, history of EBV, and recent diagnosis of scleroderma on weekly methotrexate.  Presented with back pain and adominal pain and nausea   Lipase in 3000 range and CT abdoman c/w pancreatitis  Pt requiring one l NC to maintain sats > 88%  She was discharged    As of 10/27  Still SOB cough   Difficulty walking from one end of the house to the other   Sats dropped down to 85%  IS now at 1250    PT is coming home twice a week and OR q week and RN twice a week  PT expressed that to do IS 5* q 1 hr when a awake due to exhaustion  Exhaustion is her main complain    ROS  As above    Allergies   Allergen Reactions    Other Food Anaphylaxis and Hives     Strawberry HIVES  Yellow wallace (ANAPHYLAXIS)      Codeine Vomiting and Nausea    Hydrocodone-Acetaminophen Nausea     nausea (Vicodin) Allergy conversion clean-up. Please validate at next visit.; **PCX CONVERTED DATA**       Current medicines (including changes today)  Current Outpatient Medications   Medication Sig Dispense Refill    rivaroxaban (XARELTO) 15 MG Tab tablet Take 1 Tablet by mouth 2 times a day with meals for 20 days. 40 Tablet 0    [START ON 11/10/2022] rivaroxaban (XARELTO) 20 MG Tab tablet Take 1 Tablet by mouth with dinner. 30 Tablet 2    benzonatate (TESSALON) 100 MG Cap Take 1  Capsule by mouth 3 times a day as needed for Cough. 60 Capsule 0    ondansetron (ZOFRAN ODT) 4 MG TABLET DISPERSIBLE Take 1 Tablet by mouth every four hours as needed for Nausea. 30 Tablet 0    cyclosporin (RESTASIS) 0.05 % ophthalmic emulsion Administer 1 Drop into both eyes 2 times a day.      folic acid (FOLVITE) 1 MG Tab Take 1 mg by mouth see administration instructions. Takes on Tuesday, Wednesday, Thursday, Friday, and Saturday      methotrexate 2.5 MG Tab Take 7.5 mg by mouth see administration instructions. Take 7.5 mg on Sunday night   Take 7.5 mg on Monday morning      levothyroxine (SYNTHROID) 50 MCG Tab Take 50 mcg by mouth every morning on an empty stomach.      liothyronine (CYTOMEL) 5 MCG Tab Take 2.5 mcg by mouth every day.      estradiol (ESTRACE) 0.1 MG/GM vaginal cream Insert 1 g into the vagina see administration instructions. Uses every 4 days      Flaxseed, Linseed, (FLAX SEEDS PO) Take 1 Tablet by mouth every day.      Nutritional Supplements (GRAPESEED EXTRACT PO) Take 1 Tablet by mouth every day.      Bioflavonoid Products (KYARA C PO) Take 1 Tablet by mouth every day.      Coenzyme Q10 (CO Q 10 PO) Take 1 Tablet by mouth every day.      CRANBERRY PO Take 1 Tablet by mouth every day.      Cholecalciferol (VITAMIN D3) 2000 UNIT Cap Take 2,000 Units by mouth every evening.      5-Hydroxytryptophan (5-HTP PO) Take 1 Tab by mouth every day.      LUTEIN-ZEAXANTHIN PO Take 1 Tab by mouth every day.      Probiotic Product (PROBIOTIC DAILY PO) Take 1 Cap by mouth every day.      Glucosamine-Chondroitin-MSM (TRIPLE FLEX PO) Take 1 Tablet by mouth every day.      TURMERIC PO Take 1 Tablet by mouth every day.      simvastatin (ZOCOR) 10 MG Tab Take 10 mg by mouth every day.       No current facility-administered medications for this visit.       Patient Active Problem List    Diagnosis Date Noted    Hypokalemia 10/20/2022    Edema of left upper extremity 10/20/2022    Acute respiratory failure with  hypoxemia due to volume overload (HCC) 10/18/2022    Hyponatremia 10/16/2022    Severe acute pancreatitis 10/14/2022    Scleroderma (HCC) 10/14/2022    Hyperglycemia 06/13/2018    Hyperlipidemia 10/02/2015    Hypothyroid 10/02/2015    Palpitation 10/02/2015    Decreased TSH 10/02/2015       Family History   Problem Relation Age of Onset    Hyperlipidemia Mother     Hypertension Mother     Diabetes Mother     Hyperlipidemia Father     Hypertension Father     Diabetes Father     Cancer Father        She  has a past medical history of Anxiety, Arrhythmia, Arthritis, Bowel habit changes, Lenin Barr virus infection, Herpes, High blood pressure (6/13/2018), High cholesterol, Hypercholesteremia, Hypothyroid, Pneumonia (2019), Thyroid disease, and Tinnitus.    She has no past medical history of ASTHMA.  She  has a past surgical history that includes lumpectomy; hip replacement, total; other orthopedic surgery (Left, 2007); abdominal hysterectomy total (2004); primary c section; other (2010); colonoscopy; and myringotomy, bilateral, with insertion of tympanostomy d (Bilateral, 5/10/2021).       Objective:   There were no vitals taken for this visit.    Physical Exam    Unable to see    Assessment and Plan:   The following treatment plan was discussed:     1. Acute respiratory failure with hypoxemia due to volume overload (HCC)  - DX-CHEST-2 VIEWS; Future  - DX-CHEST-2 VIEWS; Future  Still not improved and SOB  No alerts on our monitoring end but pt able to see transient drops in her oxygenation with exertion  Repeat CXR  Try zoom again tomorrow pm to review CXR    Total time 9 mins    Follow-up: Return in about 1 day (around 10/28/2022) for zoom rpm.

## 2022-10-28 ENCOUNTER — TELEPHONE (OUTPATIENT)
Dept: OTHER | Facility: MEDICAL CENTER | Age: 72
End: 2022-10-28

## 2022-10-28 ENCOUNTER — TELEPHONE (OUTPATIENT)
Dept: OTHER | Facility: MEDICAL CENTER | Age: 72
End: 2022-10-28
Payer: MEDICARE

## 2022-10-28 ENCOUNTER — HOSPITAL ENCOUNTER (OUTPATIENT)
Dept: RADIOLOGY | Facility: MEDICAL CENTER | Age: 72
End: 2022-10-28
Attending: INTERNAL MEDICINE
Payer: MEDICARE

## 2022-10-28 ENCOUNTER — TELEMEDICINE (OUTPATIENT)
Dept: SLEEP MEDICINE | Facility: MEDICAL CENTER | Age: 72
End: 2022-10-28
Payer: COMMERCIAL

## 2022-10-28 VITALS — OXYGEN SATURATION: 97 % | RESPIRATION RATE: 18 BRPM | HEART RATE: 77 BPM

## 2022-10-28 DIAGNOSIS — J96.01 ACUTE RESPIRATORY FAILURE WITH HYPOXEMIA (HCC): ICD-10-CM

## 2022-10-28 PROCEDURE — 99999 PR NO CHARGE: CPT | Performed by: INTERNAL MEDICINE

## 2022-10-28 PROCEDURE — 71046 X-RAY EXAM CHEST 2 VIEWS: CPT

## 2022-10-28 NOTE — TELEPHONE ENCOUNTER
RPM Notification: Patient Communication  Actions: None    Total time (minutes) spent communicating with patient:       Miranda sent a text over that she was headed to the hospital for her X-Ray. She stated she has been off of the oxygen and not alerting.      She had her X-Ray done and is now home and still off oxygen. Will continue to monitor and call her back if she alerts or disconnects.

## 2022-10-28 NOTE — TELEPHONE ENCOUNTER
"RPM Notification: Patient Communication  Actions: None    Total time (minutes) spent communicating with patient: 10 min    Miranda messaged via text [phone]  \"Im off o2 right now. Testing my portable O2 machine,so I'm letting my saturation level drop a bit then will use poc to paise it as a test prior to needing it to go to [facility] for chest X-ray tomorrow AM.\"    I thanked her for letting us know. No alerts    Miranda messaged via text [phone]  \" The good news is that my saturation level hasn't gone below 91-92%with no O2!! So im going to assume that this portable pulse O2 machine is working because its raising me to mid 90's. Thanks for your patience.\"    RPM \" That sounds even better!! We will continue to monitor you and call every time you alert.\"  "

## 2022-10-28 NOTE — PROGRESS NOTES
RPM monitoring    Called pt tp review xray  Pt was not on video   Pt has been off o2 all day  CXR as seen below right pleural effusion has resolved, left  Pt has a cough no sputum but has been doing chores without problems  Pt will let us know when she ids off oxygen 24 hrs

## 2022-10-29 ENCOUNTER — TELEPHONE (OUTPATIENT)
Dept: OTHER | Facility: MEDICAL CENTER | Age: 72
End: 2022-10-29
Payer: MEDICARE

## 2022-10-29 VITALS — HEART RATE: 105 BPM | OXYGEN SATURATION: 94 %

## 2022-10-29 VITALS — HEART RATE: 98 BPM | OXYGEN SATURATION: 93 %

## 2022-10-29 NOTE — TELEPHONE ENCOUNTER
RPM Notification: Medium Alert  Actions: None    Total time (minutes) spent communicating with patient: 0      Pt txt at 1652 to let us know that she received a notification to replace the sensor. I advised her to change her wrist band and to keep the chip. Pt changed her wrist band and is not  connected. Pt also said that she has been off O2 since 0830 with no problems, but since she has been laying down her oxygen goes to 88%, pt said that deep breathing brings her O2 back up but that was beginning to exhaust her. Pt asked if  she should get back on O2, I advised pt to prop herself with some pillows when she lays down and pt said she tried using a wedge pillow. I let pt know to use her oxygen when she lays down.

## 2022-10-29 NOTE — TELEPHONE ENCOUNTER
RPM Notification: Reconnect and Patient Communication  Actions: None    Total time (minutes) spent communicating with patient: 30 Sec     iMranda messaged that she was reconnected. We were able to so her numbers

## 2022-10-30 ENCOUNTER — TELEPHONE (OUTPATIENT)
Dept: OTHER | Facility: MEDICAL CENTER | Age: 72
End: 2022-10-30
Payer: MEDICARE

## 2022-10-30 VITALS — HEART RATE: 90 BPM | RESPIRATION RATE: 14 BRPM | OXYGEN SATURATION: 93 %

## 2022-10-30 NOTE — TELEPHONE ENCOUNTER
RPM Notification: Patient Communication  Actions: None    Total time (minutes) spent communicating with patient: 1    At 0902 Pt contacted us via text to inform us that she was going off of her oxygen. Pt stated she was hopefully not going to need it for the rest of the day. Let pt know that we will keep an eye out on her oxygen and will reach out if she alerts and/or disconnects. Pt is trialing being on room air today.

## 2022-10-31 ENCOUNTER — TELEPHONE (OUTPATIENT)
Dept: OTHER | Facility: MEDICAL CENTER | Age: 72
End: 2022-10-31
Payer: MEDICARE

## 2022-10-31 VITALS — HEART RATE: 113 BPM | RESPIRATION RATE: 20 BRPM | OXYGEN SATURATION: 96 %

## 2022-10-31 NOTE — TELEPHONE ENCOUNTER
RPM Notification: Patient Communication  Actions: None    Total time (minutes) spent communicating with patient: 30 sec    Miranda messaged that she will be taking oxygen off for the day. At this time she is on RA

## 2022-11-03 ENCOUNTER — TELEPHONE (OUTPATIENT)
Dept: OTHER | Facility: MEDICAL CENTER | Age: 72
End: 2022-11-03
Payer: MEDICARE

## 2022-11-03 VITALS — RESPIRATION RATE: 15 BRPM | OXYGEN SATURATION: 95 % | HEART RATE: 100 BPM

## 2022-11-03 VITALS — HEART RATE: 108 BPM | OXYGEN SATURATION: 91 %

## 2022-11-03 VITALS — OXYGEN SATURATION: 90 % | HEART RATE: 107 BPM

## 2022-11-03 NOTE — TELEPHONE ENCOUNTER
RPM Notification: Reconnect  Actions: None    Total time (minutes) spent communicating with patient: 1    At 1459 Reconnect. Pt has reconnected back to the monitor. Pt sent a text saying she has reconnected and if we can see her back on. I let her know that we can.

## 2022-11-03 NOTE — TELEPHONE ENCOUNTER
RPM Notification: Disconnect  Actions: None    Total time (minutes) spent communicating with patient: 1    At 1442 Disconnect. Pt contacted via text to inform us that she was going to take off her monitor to shower. I told pt to just reconnect once she is finished.

## 2022-11-03 NOTE — TELEPHONE ENCOUNTER
At 1105 pt disconnected and we contacted pt. Pt is doing fine, we let pt know she was disconnected from the monitor. Advised pt to check monitor. Helped pt troubleshoot. At 1118 pt reconnected back to monitor.

## 2022-11-06 ENCOUNTER — TELEPHONE (OUTPATIENT)
Dept: OTHER | Facility: MEDICAL CENTER | Age: 72
End: 2022-11-06
Payer: MEDICARE

## 2022-11-06 VITALS — OXYGEN SATURATION: 97 % | RESPIRATION RATE: 11 BRPM | HEART RATE: 66 BPM

## 2022-11-06 VITALS — HEART RATE: 73 BPM | OXYGEN SATURATION: 96 % | RESPIRATION RATE: 15 BRPM

## 2022-11-06 NOTE — TELEPHONE ENCOUNTER
RPM Notification: Disconnect  Actions: None    Total time (minutes) spent communicating with patient: 3 mins.    Called pt to inform her that we couldn't see her vitials, and asked her what her vitials are

## 2022-11-06 NOTE — TELEPHONE ENCOUNTER
RPM Notification: Patient Communication  Actions: None    Total time (minutes) spent communicating with patient: 30 sec    Miranda messaged via text that she will be taking the oxygen off for the day.    She is currently RA

## 2022-11-07 ENCOUNTER — TELEPHONE (OUTPATIENT)
Dept: OTHER | Facility: MEDICAL CENTER | Age: 72
End: 2022-11-07
Payer: MEDICARE

## 2022-11-07 VITALS — HEART RATE: 93 BPM | OXYGEN SATURATION: 94 % | RESPIRATION RATE: 12 BRPM

## 2022-11-07 NOTE — TELEPHONE ENCOUNTER
RPM Notification: Patient Communication  Actions: None    Total time (minutes) spent communicating with patient: 0    Pt sent a txt at 0821 to let us know that she is going off oxygen for the day.

## 2022-11-08 ENCOUNTER — TELEPHONE (OUTPATIENT)
Dept: OTHER | Facility: MEDICAL CENTER | Age: 72
End: 2022-11-08

## 2022-11-08 VITALS — OXYGEN SATURATION: 96 % | HEART RATE: 69 BPM | RESPIRATION RATE: 14 BRPM

## 2022-11-08 VITALS — RESPIRATION RATE: 19 BRPM | OXYGEN SATURATION: 94 % | HEART RATE: 91 BPM

## 2022-11-08 NOTE — TELEPHONE ENCOUNTER
RPM Notification: Patient Communication and Oxygen DME  Actions: Contacted DME Company    Total time (minutes) spent communicating with patient: 10 min    1341 Messaged via text if it was a good time to give Miranda a call.  She messaged back that is was ok to give her a call.    I called and let her know we had her next appointment scheduled and the MD had a few questions.  She asked what Dr. I let her know , the one that did her appointment today. I let her know her next scheduled appointment was with Dr. Ramirez on 11/15. She stated that is not a good time as she has several appointments and she has a patient that day also. I asked if she had a day that was good. She stated Monday or Wednesday after 11am. I asked her that Dr. Mensah wanted too know where inn [place] she was going on the 17th and if she was flying or driving, to figure out oxygen.  She stated she was going to [address] for several appointments and that they were flying. I let her know I was going to let our RN supervisor know all this information and that I would call her back.  5843 I called her back letting her know that her appointment was change to 11/16 after 11AM.  I also let her know the RN supervisor is getting a hold of her oxygen company to find out what they can do for her oxygen, while she is in [place]. She stated thank oyu. She also said she just got a notification that her battery was going low. She stated she was going to change it out

## 2022-11-08 NOTE — TELEPHONE ENCOUNTER
RPM Notification: Patient Communication  Actions: None    Total time (minutes) spent communicating with patient: 3 min    Miranda messaged letting us know she is taking oxygen off for the day. She is at [facility]. She also asked if we had any idea when her appointment time will be.  RN reached out to MD.  MD stated either before noon or after 1500.  I let her know what MD said. I apologized for us not having an exact time.  She stated she understood and thanked us

## 2022-11-09 ENCOUNTER — PATIENT MESSAGE (OUTPATIENT)
Dept: HEALTH INFORMATION MANAGEMENT | Facility: OTHER | Age: 72
End: 2022-11-09

## 2022-11-10 ENCOUNTER — HOSPITAL ENCOUNTER (OUTPATIENT)
Dept: LAB | Facility: MEDICAL CENTER | Age: 72
End: 2022-11-10
Attending: INTERNAL MEDICINE
Payer: MEDICARE

## 2022-11-10 ENCOUNTER — TELEPHONE (OUTPATIENT)
Dept: OTHER | Facility: MEDICAL CENTER | Age: 72
End: 2022-11-10
Payer: MEDICARE

## 2022-11-10 ENCOUNTER — HOSPITAL ENCOUNTER (OUTPATIENT)
Dept: LAB | Facility: MEDICAL CENTER | Age: 72
End: 2022-11-10
Attending: EMERGENCY MEDICINE
Payer: MEDICARE

## 2022-11-10 VITALS — RESPIRATION RATE: 11 BRPM | OXYGEN SATURATION: 98 % | HEART RATE: 81 BPM

## 2022-11-10 VITALS — OXYGEN SATURATION: 92 % | RESPIRATION RATE: 17 BRPM

## 2022-11-10 LAB
ALBUMIN SERPL BCP-MCNC: 4.6 G/DL (ref 3.2–4.9)
ALBUMIN/GLOB SERPL: 1.8 G/DL
ALP SERPL-CCNC: 78 U/L (ref 30–99)
ALT SERPL-CCNC: 16 U/L (ref 2–50)
ANION GAP SERPL CALC-SCNC: 11 MMOL/L (ref 7–16)
AST SERPL-CCNC: 23 U/L (ref 12–45)
BASOPHILS # BLD AUTO: 0.3 % (ref 0–1.8)
BASOPHILS # BLD AUTO: 0.4 % (ref 0–1.8)
BASOPHILS # BLD: 0.02 K/UL (ref 0–0.12)
BASOPHILS # BLD: 0.02 K/UL (ref 0–0.12)
BILIRUB SERPL-MCNC: 0.2 MG/DL (ref 0.1–1.5)
BUN SERPL-MCNC: 16 MG/DL (ref 8–22)
C3 SERPL-MCNC: 120.4 MG/DL (ref 87–200)
C4 SERPL-MCNC: 24.7 MG/DL (ref 19–52)
CALCIUM SERPL-MCNC: 9.8 MG/DL (ref 8.5–10.5)
CHLORIDE SERPL-SCNC: 105 MMOL/L (ref 96–112)
CO2 SERPL-SCNC: 25 MMOL/L (ref 20–33)
CREAT SERPL-MCNC: 0.66 MG/DL (ref 0.5–1.4)
CRP SERPL HS-MCNC: <0.3 MG/DL (ref 0–0.75)
CRP SERPL HS-MCNC: <0.3 MG/DL (ref 0–0.75)
EOSINOPHIL # BLD AUTO: 0.01 K/UL (ref 0–0.51)
EOSINOPHIL # BLD AUTO: 0.02 K/UL (ref 0–0.51)
EOSINOPHIL NFR BLD: 0.2 % (ref 0–6.9)
EOSINOPHIL NFR BLD: 0.4 % (ref 0–6.9)
ERYTHROCYTE [DISTWIDTH] IN BLOOD BY AUTOMATED COUNT: 53 FL (ref 35.9–50)
ERYTHROCYTE [DISTWIDTH] IN BLOOD BY AUTOMATED COUNT: 53.9 FL (ref 35.9–50)
ERYTHROCYTE [SEDIMENTATION RATE] IN BLOOD BY WESTERGREN METHOD: 23 MM/HOUR (ref 0–25)
GFR SERPLBLD CREATININE-BSD FMLA CKD-EPI: 93 ML/MIN/1.73 M 2
GLOBULIN SER CALC-MCNC: 2.6 G/DL (ref 1.9–3.5)
GLUCOSE SERPL-MCNC: 116 MG/DL (ref 65–99)
HCT VFR BLD AUTO: 36.1 % (ref 37–47)
HCT VFR BLD AUTO: 36.8 % (ref 37–47)
HGB BLD-MCNC: 11.6 G/DL (ref 12–16)
HGB BLD-MCNC: 11.7 G/DL (ref 12–16)
HGB RETIC QN AUTO: 35.6 PG/CELL (ref 29–35)
IMM GRANULOCYTES # BLD AUTO: 0.01 K/UL (ref 0–0.11)
IMM GRANULOCYTES # BLD AUTO: 0.02 K/UL (ref 0–0.11)
IMM GRANULOCYTES NFR BLD AUTO: 0.2 % (ref 0–0.9)
IMM GRANULOCYTES NFR BLD AUTO: 0.4 % (ref 0–0.9)
IMM RETICS NFR: 10.5 % (ref 9.3–17.4)
LIPASE SERPL-CCNC: 66 U/L (ref 11–82)
LYMPHOCYTES # BLD AUTO: 1.38 K/UL (ref 1–4.8)
LYMPHOCYTES # BLD AUTO: 1.43 K/UL (ref 1–4.8)
LYMPHOCYTES NFR BLD: 24.5 % (ref 22–41)
LYMPHOCYTES NFR BLD: 24.8 % (ref 22–41)
MCH RBC QN AUTO: 30.7 PG (ref 27–33)
MCH RBC QN AUTO: 31.2 PG (ref 27–33)
MCHC RBC AUTO-ENTMCNC: 31.8 G/DL (ref 33.6–35)
MCHC RBC AUTO-ENTMCNC: 32.1 G/DL (ref 33.6–35)
MCV RBC AUTO: 96.6 FL (ref 81.4–97.8)
MCV RBC AUTO: 97 FL (ref 81.4–97.8)
MONOCYTES # BLD AUTO: 0.3 K/UL (ref 0–0.85)
MONOCYTES # BLD AUTO: 0.33 K/UL (ref 0–0.85)
MONOCYTES NFR BLD AUTO: 5.2 % (ref 0–13.4)
MONOCYTES NFR BLD AUTO: 5.9 % (ref 0–13.4)
NEUTROPHILS # BLD AUTO: 3.86 K/UL (ref 2–7.15)
NEUTROPHILS # BLD AUTO: 4 K/UL (ref 2–7.15)
NEUTROPHILS NFR BLD: 68.4 % (ref 44–72)
NEUTROPHILS NFR BLD: 69.3 % (ref 44–72)
NRBC # BLD AUTO: 0 K/UL
NRBC # BLD AUTO: 0 K/UL
NRBC BLD-RTO: 0 /100 WBC
NRBC BLD-RTO: 0 /100 WBC
PLATELET # BLD AUTO: 371 K/UL (ref 164–446)
PLATELET # BLD AUTO: 386 K/UL (ref 164–446)
PMV BLD AUTO: 10.3 FL (ref 9–12.9)
PMV BLD AUTO: 10.5 FL (ref 9–12.9)
POTASSIUM SERPL-SCNC: 4.1 MMOL/L (ref 3.6–5.5)
PROT SERPL-MCNC: 7.2 G/DL (ref 6–8.2)
RBC # BLD AUTO: 3.72 M/UL (ref 4.2–5.4)
RBC # BLD AUTO: 3.81 M/UL (ref 4.2–5.4)
RETICS # AUTO: 0.05 M/UL (ref 0.04–0.06)
RETICS/RBC NFR: 1.4 % (ref 0.8–2.1)
SODIUM SERPL-SCNC: 141 MMOL/L (ref 135–145)
WBC # BLD AUTO: 5.6 K/UL (ref 4.8–10.8)
WBC # BLD AUTO: 5.8 K/UL (ref 4.8–10.8)

## 2022-11-10 PROCEDURE — 86663 EPSTEIN-BARR ANTIBODY: CPT

## 2022-11-10 PROCEDURE — 86140 C-REACTIVE PROTEIN: CPT | Mod: 91

## 2022-11-10 PROCEDURE — 85025 COMPLETE CBC W/AUTO DIFF WBC: CPT | Mod: 91

## 2022-11-10 PROCEDURE — 82784 ASSAY IGA/IGD/IGG/IGM EACH: CPT

## 2022-11-10 PROCEDURE — 85652 RBC SED RATE AUTOMATED: CPT

## 2022-11-10 PROCEDURE — 86664 EPSTEIN-BARR NUCLEAR ANTIGEN: CPT

## 2022-11-10 PROCEDURE — 36415 COLL VENOUS BLD VENIPUNCTURE: CPT

## 2022-11-10 PROCEDURE — 83690 ASSAY OF LIPASE: CPT

## 2022-11-10 PROCEDURE — 86665 EPSTEIN-BARR CAPSID VCA: CPT | Mod: 91

## 2022-11-10 PROCEDURE — 85046 RETICYTE/HGB CONCENTRATE: CPT

## 2022-11-10 PROCEDURE — 80053 COMPREHEN METABOLIC PANEL: CPT

## 2022-11-10 PROCEDURE — 86140 C-REACTIVE PROTEIN: CPT

## 2022-11-10 PROCEDURE — 86160 COMPLEMENT ANTIGEN: CPT

## 2022-11-10 PROCEDURE — 85025 COMPLETE CBC W/AUTO DIFF WBC: CPT

## 2022-11-10 NOTE — TELEPHONE ENCOUNTER
RPM Notification: Patient Communication  Actions: None    Total time (minutes) spent communicating with patient: 0      Miranda txt at 1404 to let us know that she is heading to get her blood drawn and then stop at the ER to  some supplies. I asked her what car she was going to be in so I can let the RT know. Pt also said that she is going to check to see if the MD's ultrasound order has arrived so she can do a walk in ultrasound. Edwin also told me on her [place] harish it is saying that the sensor is off her finger but she had the sensor on her finger and that her bluetooth is connected. I asked if she got the senor wet and she said no. I told the pt that when she gets home and if it keeps saying sensor off finger on her harish to just replace her sensor.

## 2022-11-11 ENCOUNTER — TELEPHONE (OUTPATIENT)
Dept: OTHER | Facility: MEDICAL CENTER | Age: 72
End: 2022-11-11
Payer: MEDICARE

## 2022-11-11 NOTE — TELEPHONE ENCOUNTER
RPM Notification: Patient Communication and Oxygen DME  Actions: Physician Notification and Contacted DME Company    Total time (minutes) spent communicating with patient: 1     Patient will be traveling to [place] on 11/17 for two weeks and had questions regarding oxygen supplies when she gets to [place]. Dr. Mensah asked this RN to see what options are available for patient.     This RN contacted [facility] and unfortunately, they do not have facilities or service to the area in [place] in which patient will be staying. Their recommendations are to have the patient contact the airline in which she will be flying on and ensure the patient can check in her current concentrator to use while in [place].     Relayed this information to patient and Dr. Mensah

## 2022-11-11 NOTE — TELEPHONE ENCOUNTER
RPM Notification: Patient Communication  Actions: None    Total time (minutes) spent communicating with patient: 2    Miranda sent a text over that she was taking the oxygen off for the day. Message was received at 0853.

## 2022-11-11 NOTE — TELEPHONE ENCOUNTER
RPM Notification: Reconnect  Actions: None    Total time (minutes) spent communicating with patient: 0      Miranda was disconnected while she went to get her blood drawn and to  her supplies. Pt has her supplies and changed her battery. Vitals are in normal range.

## 2022-11-12 ENCOUNTER — TELEPHONE (OUTPATIENT)
Dept: OTHER | Facility: MEDICAL CENTER | Age: 72
End: 2022-11-12
Payer: MEDICARE

## 2022-11-12 VITALS — OXYGEN SATURATION: 95 % | HEART RATE: 87 BPM

## 2022-11-12 VITALS — OXYGEN SATURATION: 96 % | RESPIRATION RATE: 14 BRPM | HEART RATE: 67 BPM

## 2022-11-12 LAB
EBV EA-D IGG SER-ACNC: <5 U/ML (ref 0–10.9)
EBV NA IGG SER IA-ACNC: 112 U/ML (ref 0–21.9)
EBV VCA IGG SER IA-ACNC: >750 U/ML (ref 0–21.9)
EBV VCA IGM SER IA-ACNC: <10 U/ML (ref 0–43.9)
IGA SERPL-MCNC: 113 MG/DL (ref 68–408)
IGG SERPL-MCNC: 829 MG/DL (ref 768–1632)
IGM SERPL-MCNC: 123 MG/DL (ref 35–263)

## 2022-11-12 NOTE — TELEPHONE ENCOUNTER
RPM Notification: Patient Communication and Oxygen DME  Actions: Physician Notification and Contacted DME Company    Total time (minutes) spent communicating with patient: 10    Patient contacted [facility] via text asking if there was a way she could go through another oxygen company while in [place] and how it works with Medicare. This RN explained the process with DME and insurance companies, only usually authorizing oxygen through one DME company. Also explained that she would need a new order placed and sometimes they require a new face to face and walking 02 test. Explained that normally this is taken care of in the hospital with a  /  and out of my normal realm.    Patient reached out to an oxygen provider in [place] and they stated that they would provide a concentrator while in [place] and she was going to pay out of pocket, but she needs a prescription to be faxed to her to provide the company.    This RN reached out to both Dr. Mensah and Dr. Nazario and explained the situation and for further guidance. Dr. Mensah was able to place an order to be sent directly over to the oxygen company in [place] and Dr. Nazario concurred with Dr. Mensah.    Information was provided to this RN from patient and order was faxed over to [address] [place] [address] in [place], [place] at [phone] on 11/11 at 4863

## 2022-11-12 NOTE — TELEPHONE ENCOUNTER
RPM Notification: Disconnect  Actions: None    Total time (minutes) spent communicating with patient: 1    Patient texted to inform that she would be going off O2 for the day.

## 2022-11-13 ENCOUNTER — TELEPHONE (OUTPATIENT)
Dept: OTHER | Facility: MEDICAL CENTER | Age: 72
End: 2022-11-13
Payer: MEDICARE

## 2022-11-13 VITALS — RESPIRATION RATE: 18 BRPM | HEART RATE: 90 BPM | OXYGEN SATURATION: 95 %

## 2022-11-13 NOTE — TELEPHONE ENCOUNTER
RPM Notification: Reconnect  Actions: None    Total time (minutes) spent communicating with patient: 4    Patient disconnected at 1530, I contacted them at 1600 in order to troubleshoot. After resetting the harish, reconnecting her bluetooth, and restarting her phone. She did not reconnect. She wanted to take a shower before continuing to troubleshoot. Once she was done, she replace the battery and she was reconnected without further issue.

## 2022-11-13 NOTE — TELEPHONE ENCOUNTER
RPM Notification: Disconnect  Actions: None    Total time (minutes) spent communicating with patient: 2        I called the pt to let her know that she disconnected at 1501. She looked at her wrist band and said it was flashing blue and checked to see her bluetooth was connected and she said it wasn't. Pt reconnected to the bluetooth and turned her phone off. Pt reconnected at 1504

## 2022-11-14 ENCOUNTER — TELEPHONE (OUTPATIENT)
Dept: OTHER | Facility: MEDICAL CENTER | Age: 72
End: 2022-11-14
Payer: MEDICARE

## 2022-11-14 ENCOUNTER — HOSPITAL ENCOUNTER (OUTPATIENT)
Dept: RADIOLOGY | Facility: MEDICAL CENTER | Age: 72
End: 2022-11-14
Attending: EMERGENCY MEDICINE
Payer: MEDICARE

## 2022-11-14 VITALS — OXYGEN SATURATION: 95 % | HEART RATE: 81 BPM | RESPIRATION RATE: 14 BRPM

## 2022-11-14 VITALS — HEART RATE: 110 BPM | OXYGEN SATURATION: 96 %

## 2022-11-14 VITALS — HEART RATE: 74 BPM | RESPIRATION RATE: 13 BRPM | OXYGEN SATURATION: 94 %

## 2022-11-14 DIAGNOSIS — I82.629 DEEP VEIN THROMBOSIS (DVT) OF UPPER EXTREMITY, UNSPECIFIED CHRONICITY, UNSPECIFIED LATERALITY, UNSPECIFIED VEIN (HCC): ICD-10-CM

## 2022-11-14 PROCEDURE — 93971 EXTREMITY STUDY: CPT | Mod: LT

## 2022-11-14 NOTE — TELEPHONE ENCOUNTER
RPM Notification: Disconnect, Reconnect, and Patient Communication  Actions: None    Total time (minutes) spent communicating with patient: 1 min     Miranda was disconnected. I let her know. She was able to reconnected. She let us know that she is going to an appointment, so she might be going in and out

## 2022-11-14 NOTE — TELEPHONE ENCOUNTER
RPM Notification: Disconnect, Reconnect, and Patient Communication  Actions: None    Total time (minutes) spent communicating with patient: 1 min    Miranda disconnected. I messaged via text, to let her know. She messaged she was going to get it reconnected. She was able to reconnect. She message she had to take the  chip out to reset.

## 2022-11-14 NOTE — TELEPHONE ENCOUNTER
RPM Notification: Patient Communication  Actions: None    Total time (minutes) spent communicating with patient: 30 sec    Miranda messaged via text. Letting us know she took her oxygen off for the day and asked if we were able to see her. I messaged back that we could and thanked her for letting us know she took her oxygen off

## 2022-11-15 ENCOUNTER — TELEPHONE (OUTPATIENT)
Dept: OTHER | Facility: MEDICAL CENTER | Age: 72
End: 2022-11-15
Payer: MEDICARE

## 2022-11-15 VITALS — OXYGEN SATURATION: 97 % | HEART RATE: 84 BPM | RESPIRATION RATE: 19 BRPM

## 2022-11-15 VITALS — HEART RATE: 77 BPM | RESPIRATION RATE: 14 BRPM | OXYGEN SATURATION: 96 %

## 2022-11-15 NOTE — TELEPHONE ENCOUNTER
RPM Notification: Patient Communication  Actions: None    Total time (minutes) spent communicating with patient: 2    Patient called to let me know she will be going to an appointment and may drop from the monitor for a little bit.     She also asked about tomorrows appointment and how to do that if shes leaving Sunday, so we are going to try to get her appointment done in the morning that way if she needs to get more supplies, she has time.

## 2022-11-15 NOTE — TELEPHONE ENCOUNTER
RPM Notification: Disconnect  Actions: None    Total time (minutes) spent communicating with patient: 1      Patient disconnected at 1207.  Gave her a call and she noticed she was offline, She said she would reconnect and call back if there was any issues.   Patient reconnected at 1212 and her SpO2 shows 96%.

## 2022-11-16 ENCOUNTER — TELEPHONE (OUTPATIENT)
Dept: OTHER | Facility: MEDICAL CENTER | Age: 72
End: 2022-11-16

## 2022-11-16 VITALS — HEART RATE: 83 BPM | OXYGEN SATURATION: 95 % | RESPIRATION RATE: 12 BRPM

## 2022-11-16 NOTE — TELEPHONE ENCOUNTER
RPM Notification: Patient Communication  Actions: None    Total time (minutes) spent communicating with patient: 30 sec    Miranda messaged via text that she is off the oxygen for the day. I messaged back thank ing her for letting us know

## 2022-12-02 ENCOUNTER — OFFICE VISIT (OUTPATIENT)
Dept: SLEEP MEDICINE | Facility: MEDICAL CENTER | Age: 72
End: 2022-12-02
Payer: MEDICARE

## 2022-12-02 VITALS
HEART RATE: 75 BPM | BODY MASS INDEX: 19.16 KG/M2 | DIASTOLIC BLOOD PRESSURE: 74 MMHG | OXYGEN SATURATION: 95 % | RESPIRATION RATE: 16 BRPM | SYSTOLIC BLOOD PRESSURE: 110 MMHG | WEIGHT: 115 LBS | HEIGHT: 65 IN

## 2022-12-02 DIAGNOSIS — G47.34 NOCTURNAL HYPOXIA: ICD-10-CM

## 2022-12-02 DIAGNOSIS — J96.01 ACUTE RESPIRATORY FAILURE WITH HYPOXEMIA (HCC): ICD-10-CM

## 2022-12-02 DIAGNOSIS — K85.90 SEVERE ACUTE PANCREATITIS: ICD-10-CM

## 2022-12-02 PROCEDURE — 99213 OFFICE O/P EST LOW 20 MIN: CPT | Performed by: PHYSICIAN ASSISTANT

## 2022-12-02 ASSESSMENT — ENCOUNTER SYMPTOMS
ORTHOPNEA: 0
INSOMNIA: 1
ROS GI COMMENTS: NO DENTURES, NO DIFFICULTY SWALLOWING
SPUTUM PRODUCTION: 0
DIZZINESS: 1
ABDOMINAL PAIN: 1
SORE THROAT: 1
SINUS PAIN: 0
PALPITATIONS: 1
WEIGHT LOSS: 1
FEVER: 1
TREMORS: 0
CHILLS: 0
HEARTBURN: 1
COUGH: 1
HEADACHES: 1
SHORTNESS OF BREATH: 0
WHEEZING: 0

## 2022-12-02 ASSESSMENT — FIBROSIS 4 INDEX: FIB4 SCORE: 1.12

## 2022-12-02 NOTE — PROGRESS NOTES
CC: Follow-up nighttime O2 needs    HPI:  Miranda Rose is a 72 y.o. year old female here today for follow-up on recent hospitalization and nocturnal hypoxia.  Previously seen by Dr. Jewel Ramirez 11/16/2022 via telemedicine.    Patient diagnosed with acute hypoxic respiratory failure secondary to pleural effusion from pancreatitis, other pertinent medical history includes anxiety, hyperlipidemia, hypothyroidism, chronic diarrhea history of EBV, scleroderma on weekly methotrexate.  She reports seeing a GI specialist in [place].  Denies history of asthma.  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  Remote brief smoking history.    Reviewed in clinic vitals including /74, HR 75, oxygen saturation 95%, BMI 19.14 kg/m2.      Reviewed home medication regimen on methotrexate, Xarelto 20 mg, benzonatate and nocturnal O2.  Patient reports pancreatitis was due to simvastatin use    Reviewed most recent imaging including chest x-ray obtained 10/28/2022 demonstrating hazy opacity bilateral lung bases improved from prior trace right pleural effusion, small left pleural effusion.    No pulmonary function testing on file.    Review of Systems   Constitutional:  Positive for fever (intermittent likely EB virus), malaise/fatigue (moderate) and weight loss. Negative for chills.   HENT:  Positive for congestion (redness or post nasal drip), sore throat (varies in severity during course of day) and tinnitus (post viral constant). Negative for hearing loss, nosebleeds and sinus pain.    Respiratory:  Positive for cough. Negative for sputum production, shortness of breath and wheezing.    Cardiovascular:  Positive for chest pain and palpitations. Negative for orthopnea (reflux).   Gastrointestinal:  Positive for abdominal pain and heartburn.        No dentures, no difficulty swallowing    Neurological:  Positive for dizziness and headaches (pulsatile pounding in head 24/7). Negative for tremors.    Psychiatric/Behavioral:  The patient has insomnia (difficulty maintaining asleep).      Past Medical History:   Diagnosis Date    Anxiety     Arrhythmia     PVC's    Arthritis     osteo    Bowel habit changes     constipation    Lenin Barr virus infection     Herpes     High blood pressure 2018    High cholesterol     Hypercholesteremia     Hypothyroid     Pneumonia 2019    Thyroid disease     Tinnitus        Past Surgical History:   Procedure Laterality Date    MYRINGOTOMY, BILATERAL, WITH INSERTION OF TYMPANOSTOMY D Bilateral 5/10/2021    Procedure: MYRINGOTOMY, BILATERAL, WITH INSERTION OF TYMPANOSTOMY TUBE IF INDICATED - EUSTACHIAN TUBE WITH BALLOON DILATION.;  Surgeon: Peterson Willard M.D.;  Location: SURGERY SAME DAY [facility];  Service: Ent    OTHER  2010    hemorroidectomy    OTHER ORTHOPEDIC SURGERY Left 2007    hip replaced    ABDOMINAL HYSTERECTOMY TOTAL      COLONOSCOPY      HIP REPLACEMENT, TOTAL      LUMPECTOMY      PRIMARY C SECTION         Family History   Problem Relation Age of Onset    Hyperlipidemia Mother     Hypertension Mother     Diabetes Mother     Hyperlipidemia Father     Hypertension Father     Diabetes Father     Cancer Father        Social History     Socioeconomic History    Marital status:      Spouse name: Not on file    Number of children: Not on file    Years of education: Not on file    Highest education level: Not on file   Occupational History    Not on file   Tobacco Use    Smoking status: Former     Types: Cigarettes     Quit date:      Years since quittin.9    Smokeless tobacco: Never   Vaping Use    Vaping Use: Never used   Substance and Sexual Activity    Alcohol use: Yes     Alcohol/week: 1.0 oz     Types: 2 Glasses of wine per week     Comment: 1 drink a day    Drug use: No     Types: Marijuana    Sexual activity: Not on file   Other Topics Concern    Not on file   Social History Narrative    Not on file     Social Determinants of Health  "    Financial Resource Strain: Not on file   Food Insecurity: Not on file   Transportation Needs: Not on file   Physical Activity: Not on file   Stress: Not on file   Social Connections: Not on file   Intimate Partner Violence: Not on file   Housing Stability: Not on file       Allergies as of 12/02/2022 - Reviewed 12/02/2022   Allergen Reaction Noted    Other food Anaphylaxis and Hives 10/02/2015    Codeine Vomiting and Nausea 10/02/2015    Hydrocodone-acetaminophen Nausea 05/07/2021        @Vital signs for this encounter:  /74   Pulse 75   Resp 16   Ht 1.651 m (5' 5\")   Wt 52.2 kg (115 lb)   SpO2 95%     Current medications as of today   Current Outpatient Medications   Medication Sig Dispense Refill    rivaroxaban (XARELTO) 20 MG Tab tablet Take 1 Tablet by mouth with dinner. 30 Tablet 2    cyclosporin (RESTASIS) 0.05 % ophthalmic emulsion Administer 1 Drop into both eyes 2 times a day.      folic acid (FOLVITE) 1 MG Tab Take 1 mg by mouth see administration instructions. Takes on Tuesday, Wednesday, Thursday, Friday, and Saturday      methotrexate 2.5 MG Tab Take 7.5 mg by mouth see administration instructions. Take 7.5 mg on Sunday night   Take 7.5 mg on Monday morning      levothyroxine (SYNTHROID) 50 MCG Tab Take 50 mcg by mouth every morning on an empty stomach.      liothyronine (CYTOMEL) 5 MCG Tab Take 2.5 mcg by mouth every day.      estradiol (ESTRACE) 0.1 MG/GM vaginal cream Insert 1 g into the vagina see administration instructions. Uses every 4 days      Flaxseed, Linseed, (FLAX SEEDS PO) Take 1 Tablet by mouth every day.      Nutritional Supplements (GRAPESEED EXTRACT PO) Take 1 Tablet by mouth every day.      Bioflavonoid Products (KYARA C PO) Take 1 Tablet by mouth every day.      Coenzyme Q10 (CO Q 10 PO) Take 1 Tablet by mouth every day.      CRANBERRY PO Take 1 Tablet by mouth every day.      Cholecalciferol (VITAMIN D3) 2000 UNIT Cap Take 2,000 Units by mouth every evening.      " 5-Hydroxytryptophan (5-HTP PO) Take 1 Tab by mouth every day.      LUTEIN-ZEAXANTHIN PO Take 1 Tab by mouth every day.      Probiotic Product (PROBIOTIC DAILY PO) Take 1 Cap by mouth every day.      Glucosamine-Chondroitin-MSM (TRIPLE FLEX PO) Take 1 Tablet by mouth every day.      TURMERIC PO Take 1 Tablet by mouth every day.      simvastatin (ZOCOR) 10 MG Tab Take 10 mg by mouth every day.      benzonatate (TESSALON) 100 MG Cap Take 1 Capsule by mouth 3 times a day as needed for Cough. 60 Capsule 0    ondansetron (ZOFRAN ODT) 4 MG TABLET DISPERSIBLE Take 1 Tablet by mouth every four hours as needed for Nausea. 30 Tablet 0     No current facility-administered medications for this visit.         Physical Exam:   Gen:           Alert and oriented, No apparent distress. Mood and affect appropriate, normal interaction with provider.  Eyes:          sclere white, conjunctive moist.  Hearing:     Grossly intact.  Dentition:    Good dentition.  Oropharynx:   Tongue normal, posterior pharynx without erythema or exudate.  Neck:        Supple, trachea midline, no masses.  Respiratory Effort: No intercostal retractions or use of accessory muscles.   Lung Auscultation:      Clear to auscultation bilaterally; no rales, rhonchi or wheezing.  CV:            Regular rate and rhythm. No edema. No murmurs, rubs or gallops.  Digits, Nails, Ext: No clubbing, cyanosis, petechiae, or nodes.   Skin:        No rashes, lesions or ulcers noted on exposed skin surfaces.                     Assessment:  1. Nocturnal hypoxia  Overnight Oximetry      2. Acute respiratory failure with hypoxemia due to volume overload (HCC)        3. Severe acute pancreatitis            Immunizations:    Flu: Declined  Pneumovax 23: 6/22/2017  Prevnar 13: 10/24/2015   Pfizer SARS-CoV-2 vaccine: 8/26/2021, 2/18/2021, 1/26/2021    Plan:  72-year-old female here to follow up on recent hospitalization and nocturnal hypoxia. Records reviewed.     Nocturnal hypoxia:  Daytime oxygen saturations adequate, will need overnight oximetry to establish continued need for nighttime O2. Will send order to [facility] in [place].  Contact/message patient with results.    Reviewed overnight oximetry report on room air obtained 12/17/22 indicated a baseline oxygen saturation of 90% and low oxygen saturation of 80%.  Patient oxygen saturation was less than 88% for approximately 30 min.     Follow-up in 6 months, sooner if needed.            This dictation was created using voice recognition software. The accuracy of the dictation is limited to the abilities of the software. I expect there may be some errors of grammar and possibly content.

## 2022-12-02 NOTE — PATIENT INSTRUCTIONS
1-will need overnight oximetry to verify continued oxygen need  2-seeing GI specialist  3-started on Methotrexate in July, clear CT at that time for scleroderma/spectrum  4-message patient regarding results  5-follow up in 6 months as needed

## 2022-12-08 ENCOUNTER — HOSPITAL ENCOUNTER (OUTPATIENT)
Dept: LAB | Facility: MEDICAL CENTER | Age: 72
End: 2022-12-08
Attending: EMERGENCY MEDICINE
Payer: MEDICARE

## 2022-12-08 LAB
ANION GAP SERPL CALC-SCNC: 10 MMOL/L (ref 7–16)
BASOPHILS # BLD AUTO: 0.4 % (ref 0–1.8)
BASOPHILS # BLD: 0.02 K/UL (ref 0–0.12)
BUN SERPL-MCNC: 18 MG/DL (ref 8–22)
CALCIUM SERPL-MCNC: 9.5 MG/DL (ref 8.5–10.5)
CHLORIDE SERPL-SCNC: 105 MMOL/L (ref 96–112)
CO2 SERPL-SCNC: 26 MMOL/L (ref 20–33)
CREAT SERPL-MCNC: 0.61 MG/DL (ref 0.5–1.4)
EOSINOPHIL # BLD AUTO: 0.02 K/UL (ref 0–0.51)
EOSINOPHIL NFR BLD: 0.4 % (ref 0–6.9)
ERYTHROCYTE [DISTWIDTH] IN BLOOD BY AUTOMATED COUNT: 54.2 FL (ref 35.9–50)
GFR SERPLBLD CREATININE-BSD FMLA CKD-EPI: 95 ML/MIN/1.73 M 2
GLUCOSE SERPL-MCNC: 119 MG/DL (ref 65–99)
HCT VFR BLD AUTO: 38 % (ref 37–47)
HGB BLD-MCNC: 12 G/DL (ref 12–16)
HGB RETIC QN AUTO: 36.4 PG/CELL (ref 29–35)
IMM GRANULOCYTES # BLD AUTO: 0.02 K/UL (ref 0–0.11)
IMM GRANULOCYTES NFR BLD AUTO: 0.4 % (ref 0–0.9)
IMM RETICS NFR: 9.1 % (ref 9.3–17.4)
LYMPHOCYTES # BLD AUTO: 1.34 K/UL (ref 1–4.8)
LYMPHOCYTES NFR BLD: 24.7 % (ref 22–41)
MCH RBC QN AUTO: 30.5 PG (ref 27–33)
MCHC RBC AUTO-ENTMCNC: 31.6 G/DL (ref 33.6–35)
MCV RBC AUTO: 96.4 FL (ref 81.4–97.8)
MONOCYTES # BLD AUTO: 0.35 K/UL (ref 0–0.85)
MONOCYTES NFR BLD AUTO: 6.4 % (ref 0–13.4)
NEUTROPHILS # BLD AUTO: 3.68 K/UL (ref 2–7.15)
NEUTROPHILS NFR BLD: 67.7 % (ref 44–72)
NRBC # BLD AUTO: 0 K/UL
NRBC BLD-RTO: 0 /100 WBC
PLATELET # BLD AUTO: 287 K/UL (ref 164–446)
PMV BLD AUTO: 10.5 FL (ref 9–12.9)
POTASSIUM SERPL-SCNC: 4.2 MMOL/L (ref 3.6–5.5)
RBC # BLD AUTO: 3.94 M/UL (ref 4.2–5.4)
RETICS # AUTO: 0.02 M/UL (ref 0.04–0.06)
RETICS/RBC NFR: 0.6 % (ref 0.8–2.1)
SODIUM SERPL-SCNC: 141 MMOL/L (ref 135–145)
WBC # BLD AUTO: 5.4 K/UL (ref 4.8–10.8)

## 2022-12-08 PROCEDURE — 36415 COLL VENOUS BLD VENIPUNCTURE: CPT

## 2022-12-08 PROCEDURE — 80048 BASIC METABOLIC PNL TOTAL CA: CPT

## 2022-12-08 PROCEDURE — 85046 RETICYTE/HGB CONCENTRATE: CPT

## 2022-12-08 PROCEDURE — 85025 COMPLETE CBC W/AUTO DIFF WBC: CPT

## 2022-12-12 ENCOUNTER — TELEPHONE (OUTPATIENT)
Dept: SLEEP MEDICINE | Facility: MEDICAL CENTER | Age: 72
End: 2022-12-12
Payer: MEDICARE

## 2022-12-12 NOTE — TELEPHONE ENCOUNTER
Patient called upset that [facility] has not received order for the overnight oximetry patient would like a call with an update.

## 2022-12-19 ENCOUNTER — PATIENT MESSAGE (OUTPATIENT)
Dept: SLEEP MEDICINE | Facility: MEDICAL CENTER | Age: 72
End: 2022-12-19
Payer: MEDICARE

## 2022-12-21 ENCOUNTER — HOSPITAL ENCOUNTER (OUTPATIENT)
Dept: RADIOLOGY | Facility: MEDICAL CENTER | Age: 72
End: 2022-12-21
Attending: EMERGENCY MEDICINE
Payer: MEDICARE

## 2022-12-21 DIAGNOSIS — Z87.19 HISTORY OF PANCREATITIS: ICD-10-CM

## 2022-12-21 DIAGNOSIS — Z87.19 PERSONAL HISTORY OF DISEASE OF DIGESTIVE SYSTEM: ICD-10-CM

## 2022-12-21 PROCEDURE — 700117 HCHG RX CONTRAST REV CODE 255: Performed by: EMERGENCY MEDICINE

## 2022-12-21 PROCEDURE — 74178 CT ABD&PLV WO CNTR FLWD CNTR: CPT

## 2022-12-21 RX ADMIN — IOHEXOL 100 ML: 350 INJECTION, SOLUTION INTRAVENOUS at 14:07

## 2023-01-05 ENCOUNTER — APPOINTMENT (OUTPATIENT)
Dept: RADIOLOGY | Facility: MEDICAL CENTER | Age: 73
End: 2023-01-05
Attending: EMERGENCY MEDICINE
Payer: MEDICARE

## 2023-01-05 DIAGNOSIS — Z87.19 PERSONAL HISTORY OF DISEASE OF DIGESTIVE SYSTEM: ICD-10-CM

## 2023-01-05 DIAGNOSIS — I82.629 ACUTE VENOUS EMBOLISM AND THROMBOSIS OF DEEP VEINS OF UPPER EXTREMITY, UNSPECIFIED LATERALITY (HCC): ICD-10-CM

## 2023-01-05 PROCEDURE — 93971 EXTREMITY STUDY: CPT | Mod: LT

## 2023-07-10 ENCOUNTER — HOSPITAL ENCOUNTER (EMERGENCY)
Facility: MEDICAL CENTER | Age: 73
End: 2023-07-11
Attending: EMERGENCY MEDICINE
Payer: MEDICARE

## 2023-07-10 DIAGNOSIS — K59.00 CONSTIPATION, UNSPECIFIED CONSTIPATION TYPE: ICD-10-CM

## 2023-07-10 DIAGNOSIS — R10.31 RIGHT LOWER QUADRANT ABDOMINAL PAIN: ICD-10-CM

## 2023-07-10 PROCEDURE — 85025 COMPLETE CBC W/AUTO DIFF WBC: CPT

## 2023-07-10 PROCEDURE — 80053 COMPREHEN METABOLIC PANEL: CPT

## 2023-07-10 PROCEDURE — 36415 COLL VENOUS BLD VENIPUNCTURE: CPT

## 2023-07-10 PROCEDURE — 99285 EMERGENCY DEPT VISIT HI MDM: CPT

## 2023-07-10 RX ORDER — SODIUM CHLORIDE 9 MG/ML
INJECTION, SOLUTION INTRAVENOUS CONTINUOUS
Status: DISCONTINUED | OUTPATIENT
Start: 2023-07-11 | End: 2023-07-11 | Stop reason: HOSPADM

## 2023-07-10 ASSESSMENT — FIBROSIS 4 INDEX: FIB4 SCORE: 1.46

## 2023-07-11 ENCOUNTER — HOSPITAL ENCOUNTER (OUTPATIENT)
Dept: RADIOLOGY | Facility: MEDICAL CENTER | Age: 73
End: 2023-07-11
Attending: EMERGENCY MEDICINE
Payer: MEDICARE

## 2023-07-11 VITALS
WEIGHT: 116 LBS | DIASTOLIC BLOOD PRESSURE: 61 MMHG | BODY MASS INDEX: 19.3 KG/M2 | RESPIRATION RATE: 14 BRPM | TEMPERATURE: 97.4 F | OXYGEN SATURATION: 95 % | SYSTOLIC BLOOD PRESSURE: 123 MMHG | HEART RATE: 75 BPM

## 2023-07-11 LAB
ALBUMIN SERPL BCP-MCNC: 4.2 G/DL (ref 3.2–4.9)
ALBUMIN/GLOB SERPL: 1.8 G/DL
ALP SERPL-CCNC: 76 U/L (ref 30–99)
ALT SERPL-CCNC: 39 U/L (ref 2–50)
ANION GAP SERPL CALC-SCNC: 12 MMOL/L (ref 7–16)
APPEARANCE UR: CLEAR
AST SERPL-CCNC: 32 U/L (ref 12–45)
BASOPHILS # BLD AUTO: 0.4 % (ref 0–1.8)
BASOPHILS # BLD: 0.02 K/UL (ref 0–0.12)
BILIRUB SERPL-MCNC: 0.2 MG/DL (ref 0.1–1.5)
BILIRUB UR QL STRIP.AUTO: NEGATIVE
BUN SERPL-MCNC: 17 MG/DL (ref 8–22)
CALCIUM ALBUM COR SERPL-MCNC: 9.3 MG/DL (ref 8.5–10.5)
CALCIUM SERPL-MCNC: 9.5 MG/DL (ref 8.4–10.2)
CHLORIDE SERPL-SCNC: 105 MMOL/L (ref 96–112)
CO2 SERPL-SCNC: 25 MMOL/L (ref 20–33)
COLOR UR: YELLOW
CREAT SERPL-MCNC: 0.72 MG/DL (ref 0.5–1.4)
EOSINOPHIL # BLD AUTO: 0.03 K/UL (ref 0–0.51)
EOSINOPHIL NFR BLD: 0.5 % (ref 0–6.9)
ERYTHROCYTE [DISTWIDTH] IN BLOOD BY AUTOMATED COUNT: 48.5 FL (ref 35.9–50)
GFR SERPLBLD CREATININE-BSD FMLA CKD-EPI: 88 ML/MIN/1.73 M 2
GLOBULIN SER CALC-MCNC: 2.3 G/DL (ref 1.9–3.5)
GLUCOSE SERPL-MCNC: 115 MG/DL (ref 65–99)
GLUCOSE UR STRIP.AUTO-MCNC: NEGATIVE MG/DL
HCT VFR BLD AUTO: 37.6 % (ref 37–47)
HGB BLD-MCNC: 12 G/DL (ref 12–16)
IMM GRANULOCYTES # BLD AUTO: 0.02 K/UL (ref 0–0.11)
IMM GRANULOCYTES NFR BLD AUTO: 0.4 % (ref 0–0.9)
KETONES UR STRIP.AUTO-MCNC: NEGATIVE MG/DL
LEUKOCYTE ESTERASE UR QL STRIP.AUTO: NEGATIVE
LYMPHOCYTES # BLD AUTO: 1.56 K/UL (ref 1–4.8)
LYMPHOCYTES NFR BLD: 28.3 % (ref 22–41)
MCH RBC QN AUTO: 30.5 PG (ref 27–33)
MCHC RBC AUTO-ENTMCNC: 31.9 G/DL (ref 32.2–35.5)
MCV RBC AUTO: 95.4 FL (ref 81.4–97.8)
MICRO URNS: NORMAL
MONOCYTES # BLD AUTO: 0.48 K/UL (ref 0–0.85)
MONOCYTES NFR BLD AUTO: 8.7 % (ref 0–13.4)
NEUTROPHILS # BLD AUTO: 3.41 K/UL (ref 1.82–7.42)
NEUTROPHILS NFR BLD: 61.7 % (ref 44–72)
NITRITE UR QL STRIP.AUTO: NEGATIVE
NRBC # BLD AUTO: 0 K/UL
NRBC BLD-RTO: 0 /100 WBC (ref 0–0.2)
PH UR STRIP.AUTO: 6.5 [PH] (ref 5–8)
PLATELET # BLD AUTO: 254 K/UL (ref 164–446)
PMV BLD AUTO: 10.5 FL (ref 9–12.9)
POTASSIUM SERPL-SCNC: 4 MMOL/L (ref 3.6–5.5)
PROT SERPL-MCNC: 6.5 G/DL (ref 6–8.2)
PROT UR QL STRIP: NEGATIVE MG/DL
RBC # BLD AUTO: 3.94 M/UL (ref 4.2–5.4)
RBC UR QL AUTO: NEGATIVE
SODIUM SERPL-SCNC: 142 MMOL/L (ref 135–145)
SP GR UR STRIP.AUTO: <=1.005
WBC # BLD AUTO: 5.5 K/UL (ref 4.8–10.8)

## 2023-07-11 PROCEDURE — A9270 NON-COVERED ITEM OR SERVICE: HCPCS | Performed by: EMERGENCY MEDICINE

## 2023-07-11 PROCEDURE — 700102 HCHG RX REV CODE 250 W/ 637 OVERRIDE(OP): Performed by: EMERGENCY MEDICINE

## 2023-07-11 PROCEDURE — 700117 HCHG RX CONTRAST REV CODE 255: Performed by: EMERGENCY MEDICINE

## 2023-07-11 PROCEDURE — 81003 URINALYSIS AUTO W/O SCOPE: CPT

## 2023-07-11 PROCEDURE — 74177 CT ABD & PELVIS W/CONTRAST: CPT

## 2023-07-11 PROCEDURE — 700105 HCHG RX REV CODE 258: Performed by: EMERGENCY MEDICINE

## 2023-07-11 RX ORDER — BISACODYL 5 MG
10 TABLET, DELAYED RELEASE (ENTERIC COATED) ORAL ONCE
Status: COMPLETED | OUTPATIENT
Start: 2023-07-11 | End: 2023-07-11

## 2023-07-11 RX ADMIN — SODIUM CHLORIDE: 9 INJECTION, SOLUTION INTRAVENOUS at 00:15

## 2023-07-11 RX ADMIN — IOHEXOL 100 ML: 350 INJECTION, SOLUTION INTRAVENOUS at 01:23

## 2023-07-11 RX ADMIN — BISACODYL 10 MG: 5 TABLET, COATED ORAL at 02:30

## 2023-07-11 ASSESSMENT — LIFESTYLE VARIABLES
TOTAL SCORE: 0
DO YOU DRINK ALCOHOL: NO
HAVE YOU EVER FELT YOU SHOULD CUT DOWN ON YOUR DRINKING: NO
EVER FELT BAD OR GUILTY ABOUT YOUR DRINKING: NO
HAVE PEOPLE ANNOYED YOU BY CRITICIZING YOUR DRINKING: NO
TOTAL SCORE: 0
EVER HAD A DRINK FIRST THING IN THE MORNING TO STEADY YOUR NERVES TO GET RID OF A HANGOVER: NO
TOTAL SCORE: 0
CONSUMPTION TOTAL: INCOMPLETE

## 2023-07-11 NOTE — ED NOTES
Pt given discharge instructions; verbalized understanding of instructions.  Ambulated out with spouse

## 2023-07-11 NOTE — DISCHARGE INSTRUCTIONS
Make sure that you are drinking plenty of water daily, high-fiber diet  Follow-up with your primary care provider this week for recheck as needed and return if any increase or worsening pain.  Laxatives for the next 1 to 2 days before you start the fiber.

## 2023-07-11 NOTE — ED TRIAGE NOTES
Chief Complaint   Patient presents with    Abdominal Pain     C/o RLQ abdominal pain x months per pt, h/o MRI of same, reports increase in pain in last 24hrs, pt  suspicious of hernia     /63   Pulse 78   Temp 36.4 °C (97.6 °F) (Temporal)   Resp 16   Wt 52.6 kg (116 lb)   SpO2 97%   BMI 19.30 kg/m²

## 2023-07-11 NOTE — ED PROVIDER NOTES
ED Provider Note    CHIEF COMPLAINT  Chief Complaint   Patient presents with    Abdominal Pain     C/o RLQ abdominal pain x months per pt, h/o MRI of same, reports increase in pain in last 24hrs, pt  suspicious of hernia       EXTERNAL RECORDS REVIEWED  Care everywhere charts were reviewed both inpatient and outpatient from [facility] as well as [facility] and [facility] for patient's unspecified chest pain, chronic DVTs, pancreatitis, ductal carcinoma of her breast and her rheumatoid arthritis.    HPI/ROS  LIMITATION TO HISTORY   Select: : None  OUTSIDE HISTORIAN(S):  Family  at the bedside    Miranda Rose is a 73 y.o. female who presents tonight via ambulance from her home secondary to a sudden onset of pain in her right pelvic region.  Upon arrival the pain is gone.  She notes that there was some bulging in the right inguinal area at the time of the pain which is now gone.  Patient denies any nausea, vomiting, difficulty with her bowel movements, recent fever or chills.  She denies any blood in her urine and has no history of previous kidney or ureteral stones.  There is a suspected history of an inguinal hernia per her   Dr. Juvencio Thomason.  She also has a known history of synovitis and a labral cyst seen on an MRI of her right hip done earlier this year at [facility] in [facility].  Her pain is not over that direct area at this time.    PAST MEDICAL HISTORY   has a past medical history of Anxiety, Arrhythmia, Arthritis, Bowel habit changes, Lenin Barr virus infection, Herpes, High blood pressure (6/13/2018), High cholesterol, Hypercholesteremia, Hypothyroid, Pneumonia (2019), Thyroid disease, and Tinnitus.    SURGICAL HISTORY   has a past surgical history that includes lumpectomy; hip replacement, total; other orthopedic surgery (Left, 2007); abdominal hysterectomy total (2004); primary c section; other (2010); colonoscopy; and myringotomy, bilateral, with  insertion of tympanostomy d (Bilateral, 5/10/2021).    FAMILY HISTORY  Family History   Problem Relation Age of Onset    Hyperlipidemia Mother     Hypertension Mother     Diabetes Mother     Hyperlipidemia Father     Hypertension Father     Diabetes Father     Cancer Father        SOCIAL HISTORY  Social History     Tobacco Use    Smoking status: Former     Types: Cigarettes     Quit date:      Years since quittin.5    Smokeless tobacco: Never   Vaping Use    Vaping Use: Never used   Substance and Sexual Activity    Alcohol use: Yes     Alcohol/week: 1.0 oz     Types: 2 Glasses of wine per week     Comment: 1 drink a day    Drug use: No     Types: Marijuana    Sexual activity: Not on file       CURRENT MEDICATIONS  Home Medications    **Home medications have not yet been reviewed for this encounter**         ALLERGIES  Allergies   Allergen Reactions    Other Food Anaphylaxis and Hives     Strawberry HIVES  Yellow wallace (ANAPHYLAXIS)      Codeine Vomiting and Nausea    Hydrocodone-Acetaminophen Nausea     nausea (Vicodin) Allergy conversion clean-up. Please validate at next visit.; **PCX CONVERTED DATA**       PHYSICAL EXAM  VITAL SIGNS: /63   Pulse 88   Temp 36.4 °C (97.6 °F) (Temporal)   Resp 16   Wt 52.6 kg (116 lb)   SpO2 94%   BMI 19.30 kg/m²    Constitutional: Patient is a petite female who looks younger than her stated age, currently in no distress.  HENT: Normocephalic, cheeks are mildly flushed, nose normal Oropharynx moist   Eyes: PERRL, EOMI, Conjunctiva without erythema   Neck: Supple   Lymphatic: No lymphadenopathy noted.   Cardiovascular: Normal heart rate and Regular rhythm. No murmur  Thorax & Lungs: Clear and equal breath sounds with good excursion. No respiratory distress, no rhonchi, wheezing or rales.   Abdomen: Bowel sounds normal in all four quadrants. Soft,nontender, patient points to her right pelvic/inguinal region and states that that was where her pain was located.   There is no increased erythema, warmth or swelling noted.  Skin: Warm, Dry, No erythema, No rashes.   Genitalia: Normal external female genitalia  Extremities: Peripheral pulses 4/4 normal range of motion with no edema  Neurologic: Alert & oriented x 3, Normal motor function, Normal sensory function  Psychiatric: Affect normal, Judgment normal, Mood normal.      DIAGNOSTIC STUDIES / PROCEDURES    LABS  Results for orders placed or performed during the hospital encounter of 07/10/23   CBC WITH DIFFERENTIAL   Result Value Ref Range    WBC 5.5 4.8 - 10.8 K/uL    RBC 3.94 (L) 4.20 - 5.40 M/uL    Hemoglobin 12.0 12.0 - 16.0 g/dL    Hematocrit 37.6 37.0 - 47.0 %    MCV 95.4 81.4 - 97.8 fL    MCH 30.5 27.0 - 33.0 pg    MCHC 31.9 (L) 32.2 - 35.5 g/dL    RDW 48.5 35.9 - 50.0 fL    Platelet Count 254 164 - 446 K/uL    MPV 10.5 9.0 - 12.9 fL    Neutrophils-Polys 61.70 44.00 - 72.00 %    Lymphocytes 28.30 22.00 - 41.00 %    Monocytes 8.70 0.00 - 13.40 %    Eosinophils 0.50 0.00 - 6.90 %    Basophils 0.40 0.00 - 1.80 %    Immature Granulocytes 0.40 0.00 - 0.90 %    Nucleated RBC 0.00 0.00 - 0.20 /100 WBC    Neutrophils (Absolute) 3.41 1.82 - 7.42 K/uL    Lymphs (Absolute) 1.56 1.00 - 4.80 K/uL    Monos (Absolute) 0.48 0.00 - 0.85 K/uL    Eos (Absolute) 0.03 0.00 - 0.51 K/uL    Baso (Absolute) 0.02 0.00 - 0.12 K/uL    Immature Granulocytes (abs) 0.02 0.00 - 0.11 K/uL    NRBC (Absolute) 0.00 K/uL   COMP METABOLIC PANEL   Result Value Ref Range    Sodium 142 135 - 145 mmol/L    Potassium 4.0 3.6 - 5.5 mmol/L    Chloride 105 96 - 112 mmol/L    Co2 25 20 - 33 mmol/L    Anion Gap 12.0 7.0 - 16.0    Glucose 115 (H) 65 - 99 mg/dL    Bun 17 8 - 22 mg/dL    Creatinine 0.72 0.50 - 1.40 mg/dL    Calcium 9.5 8.4 - 10.2 mg/dL    AST(SGOT) 32 12 - 45 U/L    ALT(SGPT) 39 2 - 50 U/L    Alkaline Phosphatase 76 30 - 99 U/L    Total Bilirubin 0.2 0.1 - 1.5 mg/dL    Albumin 4.2 3.2 - 4.9 g/dL    Total Protein 6.5 6.0 - 8.2 g/dL    Globulin 2.3 1.9 -  3.5 g/dL    A-G Ratio 1.8 g/dL   URINALYSIS (UA)    Specimen: Urine   Result Value Ref Range    Color Yellow     Character Clear     Specific Gravity <=1.005 <1.035    Ph 6.5 5.0 - 8.0    Glucose Negative Negative mg/dL    Ketones Negative Negative mg/dL    Protein Negative Negative mg/dL    Bilirubin Negative Negative    Nitrite Negative Negative    Leukocyte Esterase Negative Negative    Occult Blood Negative Negative    Micro Urine Req see below    CORRECTED CALCIUM   Result Value Ref Range    Correct Calcium 9.3 8.5 - 10.5 mg/dL   ESTIMATED GFR   Result Value Ref Range    GFR (CKD-EPI) 88 >60 mL/min/1.73 m 2        RADIOLOGY  I have independently interpreted the diagnostic imaging associated with this visit and am waiting the final reading from the radiologist.   My preliminary interpretation is as follows: No free air or obstruction, KUB reveals increased stool and bowel gas.  Radiologist interpretation:   CT-ABDOMEN-PELVIS WITH   Final Result      1.  No acute inflammation in the abdomen or pelvis. Significant artifact in the pelvis related to left hip arthroplasty.   2.  Appendix not identified.   3.  Right lower lobe pulmonary nodule measuring 6 mm.      Low Risk: CT at 6-12 months, then consider CT at 18-24 months      High Risk: CT at 6-12 months, then CT at 18-24 months      Low Risk - Minimal or absent history of smoking and of other known risk factors.      High Risk - History of smoking or of other known risk factors.      Note: These recommendations do not apply to lung cancer screening, patients with immunosuppression, or patients with known primary cancer.      Fleischner Society 2017 Guidelines for Management of Incidentally Detected Pulmonary Nodules in Adults           COURSE & MEDICAL DECISION MAKING    ED Observation Status? Yes; I am placing the patient in to an observation status due to a diagnostic uncertainty as well as therapeutic intensity. Patient placed in observation status at 23:53  AM, 7/10/2023.     Observation plan is as follows: IV fluids, laboratories, urinalysis and CT abdomen and pelvis.    Upon Reevaluation, the patient's condition has: Improved; and will be discharged.    Patient discharged from ED Observation status at 2:00 am (Time) 7/11/2023 (Date).     INITIAL ASSESSMENT, COURSE AND PLAN  Care Narrative: Patient was seen given an IV with saline, laboratories were drawn, urinalysis and CT abdomen and pelvis with IV contrast was performed.  All was negative excluding the  abdomen on her CT scan which shows increased stool and bowel gas.  I revealed the actual scan in front of the patient to show she and her  how much stool she had in the lower quadrants of her abdomen and the amount of gas in the left upper quadrant and descending colon.  Exactly where the patient's pain was located tonight is where the stool was impacted.  I am giving her Dulcolax tablets now and I have instructed her to increase the water and fiber in her diet, at least 1/2 to 1 gallon of water per day, laxatives for the next 1 to 2 days until she is completely cleaned out and then start Metamucil or other fiber.  She is being discharged in the care of her  in stable and improved condition to follow-up with her primary care doctor as needed she is stable and improved.        ADDITIONAL PROBLEM LIST  Hypothyroidism, arthritis  DISPOSITION AND DISCUSSIONS  I have discussed management of the patient with the following physicians and FATMATA's: None    Discussion of management with other QHP or appropriate source(s): None     Escalation of care considered, and ultimately not performed:acute inpatient care management, however at this time, the patient is most appropriate for outpatient management    Barriers to care at this time, including but not limited to:  None .     Decision tools and prescription drugs considered including, but not limited to:  Laxatives .    FINAL DIAGNOSIS  1. Constipation,  unspecified constipation type    2. Right lower quadrant abdominal pain           Electronically signed by: Archana Gutierrez D.O., 7/11/2023 12:43 AM

## 2023-10-17 ENCOUNTER — OFFICE VISIT (OUTPATIENT)
Dept: SLEEP MEDICINE | Facility: MEDICAL CENTER | Age: 73
End: 2023-10-17
Attending: INTERNAL MEDICINE
Payer: MEDICARE

## 2023-10-17 ENCOUNTER — PATIENT MESSAGE (OUTPATIENT)
Dept: SLEEP MEDICINE | Facility: MEDICAL CENTER | Age: 73
End: 2023-10-17

## 2023-10-17 VITALS
BODY MASS INDEX: 18.96 KG/M2 | WEIGHT: 118 LBS | SYSTOLIC BLOOD PRESSURE: 102 MMHG | HEART RATE: 85 BPM | DIASTOLIC BLOOD PRESSURE: 66 MMHG | HEIGHT: 66 IN | OXYGEN SATURATION: 100 %

## 2023-10-17 DIAGNOSIS — R09.02 HYPOXEMIA REQUIRING SUPPLEMENTAL OXYGEN: ICD-10-CM

## 2023-10-17 DIAGNOSIS — R91.8 PULMONARY NODULES: ICD-10-CM

## 2023-10-17 DIAGNOSIS — M34.9 SCLERODERMA (HCC): ICD-10-CM

## 2023-10-17 DIAGNOSIS — D89.84 IGG4 RELATED DISEASE (HCC): ICD-10-CM

## 2023-10-17 DIAGNOSIS — Z99.81 HYPOXEMIA REQUIRING SUPPLEMENTAL OXYGEN: ICD-10-CM

## 2023-10-17 DIAGNOSIS — G47.34 NOCTURNAL HYPOXIA: ICD-10-CM

## 2023-10-17 PROCEDURE — 99212 OFFICE O/P EST SF 10 MIN: CPT | Performed by: INTERNAL MEDICINE

## 2023-10-17 PROCEDURE — 99214 OFFICE O/P EST MOD 30 MIN: CPT | Performed by: INTERNAL MEDICINE

## 2023-10-17 PROCEDURE — 3078F DIAST BP <80 MM HG: CPT | Performed by: INTERNAL MEDICINE

## 2023-10-17 PROCEDURE — 3074F SYST BP LT 130 MM HG: CPT | Performed by: INTERNAL MEDICINE

## 2023-10-17 ASSESSMENT — ENCOUNTER SYMPTOMS
PALPITATIONS: 0
HEADACHES: 0
BACK PAIN: 0
SPUTUM PRODUCTION: 0
FEVER: 0
SINUS PAIN: 0
WHEEZING: 0
VOMITING: 0
MYALGIAS: 0
CONSTIPATION: 0
TREMORS: 0
SORE THROAT: 0
WEIGHT LOSS: 0
BLURRED VISION: 0
FOCAL WEAKNESS: 0
STRIDOR: 0
DEPRESSION: 0
DOUBLE VISION: 0
SHORTNESS OF BREATH: 0
EYE REDNESS: 0
PHOTOPHOBIA: 0
DIARRHEA: 0
HEMOPTYSIS: 0
ABDOMINAL PAIN: 0
COUGH: 0
ORTHOPNEA: 0
PND: 0
CHILLS: 0
WEAKNESS: 0
CLAUDICATION: 0
HEARTBURN: 0
FALLS: 0
NECK PAIN: 0
NAUSEA: 0
DIZZINESS: 0
DIAPHORESIS: 0
SPEECH CHANGE: 0
EYE PAIN: 0
EYE DISCHARGE: 0

## 2023-10-17 ASSESSMENT — FIBROSIS 4 INDEX: FIB4 SCORE: 1.47

## 2023-10-17 ASSESSMENT — PATIENT HEALTH QUESTIONNAIRE - PHQ9: CLINICAL INTERPRETATION OF PHQ2 SCORE: 0

## 2023-10-17 NOTE — PROGRESS NOTES
Chief Complaint   Patient presents with    Follow-Up     LAST SEEN 12/2/22         HPI: This patient is a 73 y.o. female whom is followed in our clinic for nocturnal hypoxemia last seen by PA, Lucretia Salguero, on 12/2/22. PMHx is significant for scleroderma and IgG4 disease followed by rheumatology in [place] where she is on MTX and Rituxan but her last infusion was held for hernia repair surgery which ended up being more extensive than thought. She is followed by cardiology also in [place] for palpitations. She was referred to us after a hospital stay last fall for acute pancreatitis after which she was requiring supplemental O2 at 1LPM at time of d/c. She was only requiring it at night as of her last visit with us in December based on RA OPO showing average SpO2 of 90% with roughly 30 minutes below 88%. Per patient she does feel that the O2 at night has improved palpitations which used to wake her from sleep even before acute pancreatitis for which an inciting agent was not discovered. She denies SOB during the day. She has mild cough. TTE from May in [place] was reportedly normal based on cardiology note. No PFT. She has no acute complaints today. She does have a CT abd/pelvis from July showing at least 3 sub-cm (up to 6 mm) nodules in RLL. Per pt her primary has ordered CT for f/u presumably chest but pt is not sure.     Past Medical History:   Diagnosis Date    Anxiety     Arrhythmia     PVC's    Arthritis     osteo    Bowel habit changes     constipation    Lenin Barr virus infection     Herpes     High blood pressure 6/13/2018    High cholesterol     Hypercholesteremia     Hypothyroid     Pneumonia 2019    Thyroid disease     Tinnitus        Social History     Socioeconomic History    Marital status:      Spouse name: Not on file    Number of children: Not on file    Years of education: Not on file    Highest education level: Not on file   Occupational History    Not on file   Tobacco Use    Smoking status: Former      Current packs/day: 0.00     Types: Cigarettes     Quit date:      Years since quittin.8    Smokeless tobacco: Never   Vaping Use    Vaping Use: Never used   Substance and Sexual Activity    Alcohol use: Yes     Alcohol/week: 1.0 oz     Types: 2 Glasses of wine per week     Comment: 1 drink a day    Drug use: No     Types: Marijuana    Sexual activity: Not on file   Other Topics Concern    Not on file   Social History Narrative    Not on file     Social Determinants of Health     Financial Resource Strain: Not on file   Food Insecurity: Not on file   Transportation Needs: Not on file   Physical Activity: Not on file   Stress: Not on file   Social Connections: Not on file   Intimate Partner Violence: Not on file   Housing Stability: Not on file       Family History   Problem Relation Age of Onset    Hyperlipidemia Mother     Hypertension Mother     Diabetes Mother     Hyperlipidemia Father     Hypertension Father     Diabetes Father     Cancer Father        Current Outpatient Medications on File Prior to Visit   Medication Sig Dispense Refill    cyclosporin (RESTASIS) 0.05 % ophthalmic emulsion Administer 1 Drop into both eyes 2 times a day.      folic acid (FOLVITE) 1 MG Tab Take 1 mg by mouth see administration instructions. Takes on Tuesday, Wednesday, Thursday, Friday, and Saturday      levothyroxine (SYNTHROID) 50 MCG Tab Take 50 mcg by mouth every morning on an empty stomach.      liothyronine (CYTOMEL) 5 MCG Tab Take 2.5 mcg by mouth every day.      estradiol (ESTRACE) 0.1 MG/GM vaginal cream Insert 1 g into the vagina see administration instructions. Uses every 4 days      Flaxseed, Linseed, (FLAX SEEDS PO) Take 1 Tablet by mouth every day.      Nutritional Supplements (GRAPESEED EXTRACT PO) Take 1 Tablet by mouth every day.      Bioflavonoid Products (KYARA C PO) Take 1 Tablet by mouth every day.      Coenzyme Q10 (CO Q 10 PO) Take 1 Tablet by mouth every day.      CRANBERRY PO Take 1 Tablet by  mouth every day.      Cholecalciferol (VITAMIN D3) 2000 UNIT Cap Take 2,000 Units by mouth every evening.      5-Hydroxytryptophan (5-HTP PO) Take 1 Tab by mouth every day.      LUTEIN-ZEAXANTHIN PO Take 1 Tab by mouth every day.      Probiotic Product (PROBIOTIC DAILY PO) Take 1 Cap by mouth every day.      Glucosamine-Chondroitin-MSM (TRIPLE FLEX PO) Take 1 Tablet by mouth every day.      TURMERIC PO Take 1 Tablet by mouth every day.      simvastatin (ZOCOR) 10 MG Tab Take 10 mg by mouth every day.      rivaroxaban (XARELTO) 20 MG Tab tablet Take 1 Tablet by mouth with dinner. 30 Tablet 2    methotrexate 2.5 MG Tab Take 7.5 mg by mouth see administration instructions. Take 7.5 mg on Sunday night   Take 7.5 mg on Monday morning       No current facility-administered medications on file prior to visit.       Benadryl [diphenhydramine], Other food, Codeine, and Hydrocodone-acetaminophen      ROS:   Review of Systems   Constitutional:  Negative for chills, diaphoresis, fever, malaise/fatigue and weight loss.   HENT:  Negative for congestion, ear discharge, ear pain, hearing loss, nosebleeds, sinus pain, sore throat and tinnitus.    Eyes:  Negative for blurred vision, double vision, photophobia, pain, discharge and redness.   Respiratory:  Negative for cough, hemoptysis, sputum production, shortness of breath, wheezing and stridor.    Cardiovascular:  Negative for chest pain, palpitations, orthopnea, claudication, leg swelling and PND.   Gastrointestinal:  Negative for abdominal pain, constipation, diarrhea, heartburn, nausea and vomiting.   Genitourinary:  Negative for dysuria and urgency.   Musculoskeletal:  Negative for back pain, falls, joint pain, myalgias and neck pain.   Skin:  Negative for itching and rash.   Neurological:  Negative for dizziness, tremors, speech change, focal weakness, weakness and headaches.   Endo/Heme/Allergies:  Negative for environmental allergies.   Psychiatric/Behavioral:  Negative for  "depression.        /66 (BP Location: Right arm, Patient Position: Sitting, BP Cuff Size: Adult)   Pulse 85   Ht 1.664 m (5' 5.5\")   Wt 53.5 kg (118 lb)   SpO2 100%   Physical Exam  Constitutional:       General: She is not in acute distress.     Appearance: Normal appearance. She is well-developed and normal weight.   HENT:      Head: Normocephalic and atraumatic.      Right Ear: External ear normal.      Left Ear: External ear normal.      Nose: Nose normal. No congestion.      Mouth/Throat:      Mouth: Mucous membranes are moist.      Pharynx: Oropharynx is clear. No oropharyngeal exudate.   Eyes:      General: No scleral icterus.     Extraocular Movements: Extraocular movements intact.      Conjunctiva/sclera: Conjunctivae normal.      Pupils: Pupils are equal, round, and reactive to light.   Neck:      Vascular: No JVD.      Trachea: No tracheal deviation.   Cardiovascular:      Rate and Rhythm: Normal rate and regular rhythm.      Heart sounds: Normal heart sounds. No murmur heard.     No friction rub. No gallop.   Pulmonary:      Effort: Pulmonary effort is normal. No accessory muscle usage or respiratory distress.      Breath sounds: Normal breath sounds. No wheezing or rales.   Abdominal:      General: There is no distension.      Palpations: Abdomen is soft.      Tenderness: There is no abdominal tenderness.   Musculoskeletal:         General: No tenderness or deformity. Normal range of motion.      Cervical back: Normal range of motion and neck supple.      Right lower leg: No edema.      Left lower leg: No edema.   Lymphadenopathy:      Cervical: No cervical adenopathy.   Skin:     General: Skin is warm and dry.      Findings: No rash.      Nails: There is no clubbing.   Neurological:      Mental Status: She is alert and oriented to person, place, and time.      Cranial Nerves: No cranial nerve deficit.      Gait: Gait normal.   Psychiatric:         Behavior: Behavior normal.       PFTs as " reviewed by me personally: none    Imaging as reviewed by me personally:  as per hPI    Assessment:  1. Nocturnal hypoxia  PULMONARY FUNCTION TESTS -Test requested: Complete Pulmonary Function Test      2. Hypoxemia requiring supplemental oxygen  PULMONARY FUNCTION TESTS -Test requested: Complete Pulmonary Function Test      3. IgG4 related disease        4. Scleroderma (HCC)        5. Pulmonary nodules            Plan:  No clear e/o sleep apnea. Pt is compliant with and benefiting from this. Given subjective improvement in palpitations, we opted not to retest to see if this was acute following pancreatitis and has resolved.  See above.  Followed by rheum. IgG4 abs were tested and negative during admission for pancreatitis  No e/o PH or ILD.  Pt will reach out to me once her CT chest is done so I can request/review images   Return in about 4 months (around 2/17/2024) for PFT any time, CT chest .

## 2023-10-20 ENCOUNTER — HOSPITAL ENCOUNTER (OUTPATIENT)
Dept: RADIOLOGY | Facility: MEDICAL CENTER | Age: 73
End: 2023-10-20
Attending: INTERNAL MEDICINE
Payer: MEDICARE

## 2023-10-20 DIAGNOSIS — R91.8 PULMONARY NODULES: ICD-10-CM

## 2023-10-20 PROCEDURE — 71250 CT THORAX DX C-: CPT

## 2023-11-14 ENCOUNTER — HOSPITAL ENCOUNTER (OUTPATIENT)
Dept: PULMONOLOGY | Facility: MEDICAL CENTER | Age: 73
End: 2023-11-14
Attending: INTERNAL MEDICINE
Payer: MEDICARE

## 2023-11-14 DIAGNOSIS — Z99.81 HYPOXEMIA REQUIRING SUPPLEMENTAL OXYGEN: ICD-10-CM

## 2023-11-14 DIAGNOSIS — R09.02 HYPOXEMIA REQUIRING SUPPLEMENTAL OXYGEN: ICD-10-CM

## 2023-11-14 DIAGNOSIS — G47.34 NOCTURNAL HYPOXIA: ICD-10-CM

## 2023-11-14 PROCEDURE — 94729 DIFFUSING CAPACITY: CPT

## 2023-11-14 PROCEDURE — 94729 DIFFUSING CAPACITY: CPT | Mod: 26 | Performed by: INTERNAL MEDICINE

## 2023-11-14 PROCEDURE — 94726 PLETHYSMOGRAPHY LUNG VOLUMES: CPT

## 2023-11-14 PROCEDURE — 94010 BREATHING CAPACITY TEST: CPT

## 2023-11-14 PROCEDURE — 94726 PLETHYSMOGRAPHY LUNG VOLUMES: CPT | Mod: 26 | Performed by: INTERNAL MEDICINE

## 2023-11-14 ASSESSMENT — PULMONARY FUNCTION TESTS
FEV1: 2.01
FEV1/FVC_PREDICTED: 78
FEV1/FVC_PERCENT_PREDICTED: 102
FEV1/FVC_PERCENT_LLN: 65
FEV1/FVC: 79
FEV1_PERCENT_PREDICTED: 90
FVC_LLN: 2.42
FEV1/FVC_PERCENT_PREDICTED: 77
FEV1_PREDICTED: 2.23
FEV1/FVC_PERCENT_PREDICTED: 103
FVC: 2.53
FEV1_LLN: 1.86
FVC_PREDICTED: 2.9
FEV1/FVC: 79
FVC_PERCENT_PREDICTED: 87

## 2023-11-22 NOTE — PROCEDURES
DATE OF SERVICE:  11/14/2023     PULMONARY FUNCTION TEST INTERPRETATION     IMPRESSION:  1.  There is no obstruction.  2.  Bronchodilator response was not tested.  3.  RV is mildly elevated to 127%.  4.  DLCO is normal.  5.  Flow volume loop appears normal.     Normal pre-bronchodilator spirometry with mildly elevated RV of unclear   significance.        ______________________________  DO LATOYA Flannery/[facility]    DD:  11/21/2023 20:42  DT:  11/21/2023 20:50    Job#:  [id number]

## 2024-06-28 ENCOUNTER — HOSPITAL ENCOUNTER (OUTPATIENT)
Dept: LAB | Facility: MEDICAL CENTER | Age: 74
End: 2024-06-28
Attending: EMERGENCY MEDICINE
Payer: MEDICARE

## 2024-06-28 LAB
ANION GAP SERPL CALC-SCNC: 8 MMOL/L (ref 7–16)
BASOPHILS # BLD AUTO: 1.1 % (ref 0–1.8)
BASOPHILS # BLD: 0.03 K/UL (ref 0–0.12)
BUN SERPL-MCNC: 12 MG/DL (ref 8–22)
CALCIUM SERPL-MCNC: 9.6 MG/DL (ref 8.5–10.5)
CHLORIDE SERPL-SCNC: 106 MMOL/L (ref 96–112)
CHOLEST SERPL-MCNC: 199 MG/DL (ref 100–199)
CO2 SERPL-SCNC: 28 MMOL/L (ref 20–33)
CREAT SERPL-MCNC: 0.71 MG/DL (ref 0.5–1.4)
EOSINOPHIL # BLD AUTO: 0.01 K/UL (ref 0–0.51)
EOSINOPHIL NFR BLD: 0.4 % (ref 0–6.9)
ERYTHROCYTE [DISTWIDTH] IN BLOOD BY AUTOMATED COUNT: 45.1 FL (ref 35.9–50)
GFR SERPLBLD CREATININE-BSD FMLA CKD-EPI: 89 ML/MIN/1.73 M 2
GLUCOSE SERPL-MCNC: 91 MG/DL (ref 65–99)
HCT VFR BLD AUTO: 38.7 % (ref 37–47)
HDLC SERPL-MCNC: 113 MG/DL
HGB BLD-MCNC: 12.6 G/DL (ref 12–16)
IMM GRANULOCYTES # BLD AUTO: 0 K/UL (ref 0–0.11)
IMM GRANULOCYTES NFR BLD AUTO: 0 % (ref 0–0.9)
LDLC SERPL CALC-MCNC: 75 MG/DL
LYMPHOCYTES # BLD AUTO: 1.1 K/UL (ref 1–4.8)
LYMPHOCYTES NFR BLD: 41.5 % (ref 22–41)
MAGNESIUM SERPL-MCNC: 2 MG/DL (ref 1.5–2.5)
MCH RBC QN AUTO: 30.6 PG (ref 27–33)
MCHC RBC AUTO-ENTMCNC: 32.6 G/DL (ref 32.2–35.5)
MCV RBC AUTO: 93.9 FL (ref 81.4–97.8)
MONOCYTES # BLD AUTO: 0.26 K/UL (ref 0–0.85)
MONOCYTES NFR BLD AUTO: 9.8 % (ref 0–13.4)
NEUTROPHILS # BLD AUTO: 1.25 K/UL (ref 1.82–7.42)
NEUTROPHILS NFR BLD: 47.2 % (ref 44–72)
NRBC # BLD AUTO: 0 K/UL
NRBC BLD-RTO: 0 /100 WBC (ref 0–0.2)
PHOSPHATE SERPL-MCNC: 4.1 MG/DL (ref 2.5–4.5)
PLATELET # BLD AUTO: 189 K/UL (ref 164–446)
PMV BLD AUTO: 11.7 FL (ref 9–12.9)
POTASSIUM SERPL-SCNC: 4.6 MMOL/L (ref 3.6–5.5)
RBC # BLD AUTO: 4.12 M/UL (ref 4.2–5.4)
SODIUM SERPL-SCNC: 142 MMOL/L (ref 135–145)
T4 FREE SERPL-MCNC: 1.36 NG/DL (ref 0.93–1.7)
TRIGL SERPL-MCNC: 55 MG/DL (ref 0–149)
TSH SERPL DL<=0.005 MIU/L-ACNC: 2.36 UIU/ML (ref 0.38–5.33)
WBC # BLD AUTO: 2.7 K/UL (ref 4.8–10.8)

## 2024-06-28 PROCEDURE — 83735 ASSAY OF MAGNESIUM: CPT | Mod: GA

## 2024-06-28 PROCEDURE — 80061 LIPID PANEL: CPT | Mod: GA

## 2024-06-28 PROCEDURE — 84439 ASSAY OF FREE THYROXINE: CPT

## 2024-06-28 PROCEDURE — 84100 ASSAY OF PHOSPHORUS: CPT

## 2024-06-28 PROCEDURE — 85025 COMPLETE CBC W/AUTO DIFF WBC: CPT

## 2024-06-28 PROCEDURE — 80048 BASIC METABOLIC PNL TOTAL CA: CPT

## 2024-06-28 PROCEDURE — 36415 COLL VENOUS BLD VENIPUNCTURE: CPT

## 2024-06-28 PROCEDURE — 84443 ASSAY THYROID STIM HORMONE: CPT

## 2024-07-01 ENCOUNTER — OFFICE VISIT (OUTPATIENT)
Dept: SLEEP MEDICINE | Facility: MEDICAL CENTER | Age: 74
End: 2024-07-01
Attending: INTERNAL MEDICINE
Payer: MEDICARE

## 2024-07-01 VITALS
WEIGHT: 119 LBS | OXYGEN SATURATION: 100 % | HEIGHT: 66 IN | DIASTOLIC BLOOD PRESSURE: 72 MMHG | BODY MASS INDEX: 19.13 KG/M2 | SYSTOLIC BLOOD PRESSURE: 124 MMHG | HEART RATE: 71 BPM

## 2024-07-01 DIAGNOSIS — R91.8 PULMONARY NODULES: ICD-10-CM

## 2024-07-01 DIAGNOSIS — G47.34 NOCTURNAL HYPOXIA: ICD-10-CM

## 2024-07-01 DIAGNOSIS — M34.9 SCLERODERMA (HCC): ICD-10-CM

## 2024-07-01 DIAGNOSIS — D89.84 IGG4 RELATED DISEASE (HCC): ICD-10-CM

## 2024-07-01 PROCEDURE — 3078F DIAST BP <80 MM HG: CPT | Performed by: INTERNAL MEDICINE

## 2024-07-01 PROCEDURE — 99214 OFFICE O/P EST MOD 30 MIN: CPT | Performed by: INTERNAL MEDICINE

## 2024-07-01 PROCEDURE — 3074F SYST BP LT 130 MM HG: CPT | Performed by: INTERNAL MEDICINE

## 2024-07-01 PROCEDURE — 99212 OFFICE O/P EST SF 10 MIN: CPT | Performed by: INTERNAL MEDICINE

## 2024-07-01 RX ORDER — ROSUVASTATIN CALCIUM 20 MG/1
20 TABLET, COATED ORAL DAILY
COMMUNITY
Start: 2024-03-21

## 2024-07-01 RX ORDER — LIOTHYRONINE SODIUM 5 UG/1
1 TABLET ORAL
COMMUNITY

## 2024-07-01 RX ORDER — PANTOPRAZOLE SODIUM 40 MG/1
TABLET, DELAYED RELEASE ORAL
COMMUNITY
Start: 2023-09-21

## 2024-07-01 ASSESSMENT — FIBROSIS 4 INDEX: FIB4 SCORE: 2.01

## 2024-07-01 ASSESSMENT — ENCOUNTER SYMPTOMS
MYALGIAS: 0
EYE REDNESS: 0
DIARRHEA: 0
ORTHOPNEA: 0
EYE PAIN: 0
FOCAL WEAKNESS: 0
CLAUDICATION: 0
TREMORS: 0
DEPRESSION: 0
PHOTOPHOBIA: 0
SINUS PAIN: 0
STRIDOR: 0
COUGH: 0
SPEECH CHANGE: 0
WHEEZING: 0
PND: 0
FEVER: 0
HEARTBURN: 0
EYE DISCHARGE: 0
NAUSEA: 0
BLURRED VISION: 0
DOUBLE VISION: 0
DIZZINESS: 0
HEADACHES: 0
WEIGHT LOSS: 0
VOMITING: 0
FALLS: 0
NECK PAIN: 0
CONSTIPATION: 0
CHILLS: 0
WEAKNESS: 0
SORE THROAT: 0
DIAPHORESIS: 0
HEMOPTYSIS: 0
PALPITATIONS: 0
SPUTUM PRODUCTION: 0
BACK PAIN: 0
SHORTNESS OF BREATH: 0
ABDOMINAL PAIN: 0

## 2024-07-01 ASSESSMENT — PATIENT HEALTH QUESTIONNAIRE - PHQ9: CLINICAL INTERPRETATION OF PHQ2 SCORE: 0

## 2024-07-02 ENCOUNTER — APPOINTMENT (OUTPATIENT)
Dept: RADIOLOGY | Facility: MEDICAL CENTER | Age: 74
End: 2024-07-02
Attending: EMERGENCY MEDICINE
Payer: MEDICARE

## 2024-07-02 DIAGNOSIS — R22.9 LOCALIZED SUPERFICIAL SWELLING, MASS, OR LUMP: ICD-10-CM

## 2024-07-02 PROCEDURE — 76882 US LMTD JT/FCL EVL NVASC XTR: CPT | Mod: LT

## 2024-10-01 ENCOUNTER — HOSPITAL ENCOUNTER (OUTPATIENT)
Dept: RADIOLOGY | Facility: MEDICAL CENTER | Age: 74
End: 2024-10-01
Attending: INTERNAL MEDICINE
Payer: MEDICARE

## 2024-10-01 DIAGNOSIS — R91.8 PULMONARY NODULES: ICD-10-CM

## 2024-10-01 PROCEDURE — 71250 CT THORAX DX C-: CPT

## 2024-10-02 ENCOUNTER — APPOINTMENT (OUTPATIENT)
Dept: SLEEP MEDICINE | Facility: MEDICAL CENTER | Age: 74
End: 2024-10-02
Attending: INTERNAL MEDICINE
Payer: MEDICARE

## 2024-10-31 NOTE — PROGRESS NOTES
Pulmonary Clinic Note    Date of Visit: 11/1/2024     Chief Complaint:  Chief Complaint   Patient presents with    Follow-Up     Last seen 7/1/24 Dr. Bean      HPI:   Miranda Rose is a very pleasant 74 y.o. year old female former smoker (20 pack-years, quit in 1980s), with a PMHx of pulmonary nodules, scleroderma, IgG4 disease followed by rheumatology in [place], who presented to the Pulmonary Clinic for a regular follow up. Last seen in the office on 7/1/2024 with Dr. Bean.     Patient seen by the pulmonary office for pulmonary nodules.  Patient also has a history of scleroderma and IgG4 disease followed by rheumatology in [place] where she has been tx wtih MTX and Rituxan reportedly current in remission.  Patient also has a history of nighttime hypoxia and OPO from 2022 on RA showed average SpO2 of 90% with roughly 30 minutes below 88%.  Patient is currently on 2 LPM of oxygen at night.  She had a CT abd/pelvis from 7/2023 showing sub-cm lung nodule in RLL and f/u CT from 10/2023 showed stability of nodules up to 4 mm in size.  Repeat CT chest in October 2024 shows stability and tiny bilateral nodules, without any new nodules.  PFTs in 2023 with normal with a FVC of 2.53 L or 87%, FEV1 2.01 L or 90%, FEV1/FVC 79%, %, %, DLCO 98% predicted.    Interval events:  11/1/2024-  Patient states that she will have occasional cough and has lost 4 pounds over the last couple months, which she attributes to her IgG4.  She denies any significant shortness of breath, sputum production, wheezing, night sweats, or hemoptysis.  She continues to follow-up with rheumatology.  She also continues to use 1 LPM of oxygen at night.  She frequently spends a large amount of time down in [place] with her family.    Oxygen use: 1 LPM of oxygen at night      Past Medical History:   Diagnosis Date    Anxiety     Arrhythmia     PVC's    Arthritis     osteo    Bowel habit changes     constipation    Lenin Barr virus infection      Herpes     High blood pressure 2018    High cholesterol     Hypercholesteremia     Hypothyroid     Pneumonia 2019    Thyroid disease     Tinnitus      Past Surgical History:   Procedure Laterality Date    MYRINGOTOMY, BILATERAL, WITH INSERTION OF TYMPANOSTOMY D Bilateral 5/10/2021    Procedure: MYRINGOTOMY, BILATERAL, WITH INSERTION OF TYMPANOSTOMY TUBE IF INDICATED - EUSTACHIAN TUBE WITH BALLOON DILATION.;  Surgeon: Peterson Willard M.D.;  Location: SURGERY SAME DAY [facility];  Service: Ent    OTHER  2010    hemorroidectomy    OTHER ORTHOPEDIC SURGERY Left 2007    hip replaced    ABDOMINAL HYSTERECTOMY TOTAL      COLONOSCOPY      HIP REPLACEMENT, TOTAL      LUMPECTOMY      PRIMARY C SECTION       Social History     Socioeconomic History    Marital status:      Spouse name: Not on file    Number of children: Not on file    Years of education: Not on file    Highest education level: Not on file   Occupational History    Not on file   Tobacco Use    Smoking status: Former     Current packs/day: 0.00     Types: Cigarettes     Quit date:      Years since quittin.8    Smokeless tobacco: Never   Vaping Use    Vaping status: Never Used   Substance and Sexual Activity    Alcohol use: Yes     Alcohol/week: 1.0 oz     Types: 2 Glasses of wine per week     Comment: 1 drink a day    Drug use: No     Types: Marijuana    Sexual activity: Not on file   Other Topics Concern    Not on file   Social History Narrative    Not on file     Social Determinants of Health     Financial Resource Strain: Not on file   Food Insecurity: Not on file   Transportation Needs: Not on file   Physical Activity: Not on file   Stress: Not on file   Social Connections: Not on file   Intimate Partner Violence: Not on file   Housing Stability: Not on file        Family History   Problem Relation Age of Onset    Hyperlipidemia Mother     Hypertension Mother     Diabetes Mother     Hyperlipidemia Father     Hypertension Father      Diabetes Father     Cancer Father      Current Outpatient Medications on File Prior to Visit   Medication Sig Dispense Refill    liothyronine (CYTOMEL) 5 MCG Tab Take 1 Tablet by mouth every day.      pantoprazole (PROTONIX) 40 MG Tablet Delayed Response       cyclosporin (RESTASIS) 0.05 % ophthalmic emulsion Administer 1 Drop into both eyes 2 times a day.      levothyroxine (SYNTHROID) 50 MCG Tab Take 50 mcg by mouth every morning on an empty stomach.      liothyronine (CYTOMEL) 5 MCG Tab Take 2.5 mcg by mouth every day.      Flaxseed, Linseed, (FLAX SEEDS PO) Take 1 Tablet by mouth every day.      Nutritional Supplements (GRAPESEED EXTRACT PO) Take 1 Tablet by mouth every day.      Bioflavonoid Products (KYARA C PO) Take 1 Tablet by mouth every day.      Coenzyme Q10 (CO Q 10 PO) Take 1 Tablet by mouth every day.      CRANBERRY PO Take 1 Tablet by mouth every day.      Cholecalciferol (VITAMIN D3) 2000 UNIT Cap Take 2,000 Units by mouth every evening.      5-Hydroxytryptophan (5-HTP PO) Take 1 Tab by mouth every day.      LUTEIN-ZEAXANTHIN PO Take 1 Tab by mouth every day.      Probiotic Product (PROBIOTIC DAILY PO) Take 1 Cap by mouth every day.      TURMERIC PO Take 1 Tablet by mouth every day.      simvastatin (ZOCOR) 10 MG Tab Take 10 mg by mouth every day.      rosuvastatin (CRESTOR) 20 MG Tab Take 20 mg by mouth every day. (Patient not taking: Reported on 11/1/2024)      folic acid (FOLVITE) 1 MG Tab Take 1 mg by mouth see administration instructions. Takes on Tuesday, Wednesday, Thursday, Friday, and Saturday (Patient not taking: Reported on 7/1/2024)      estradiol (ESTRACE) 0.1 MG/GM vaginal cream Insert 1 g into the vagina see administration instructions. Uses every 4 days      Glucosamine-Chondroitin-MSM (TRIPLE FLEX PO) Take 1 Tablet by mouth every day.       No current facility-administered medications on file prior to visit.     Allergies: Benadryl [diphenhydramine], Other food, Codeine, and  "Hydrocodone-acetaminophen    ROS:   Review of Systems   Constitutional:  Positive for weight loss. Negative for chills, diaphoresis, fever and malaise/fatigue.   HENT:  Negative for congestion and sinus pain.    Respiratory:  Positive for cough (occasional). Negative for hemoptysis, sputum production, shortness of breath and wheezing.    Cardiovascular:  Negative for chest pain, palpitations and leg swelling.   Gastrointestinal:  Negative for diarrhea, heartburn, nausea and vomiting.   Musculoskeletal:  Negative for falls and myalgias.   Neurological:  Negative for dizziness, weakness and headaches.     Vitals:  /82 (BP Location: Right arm, Patient Position: Sitting, BP Cuff Size: Adult)   Pulse 76   Ht 1.651 m (5' 5\")   Wt 53.5 kg (118 lb)   SpO2 98%     Physical Exam  Constitutional:       General: She is not in acute distress.     Appearance: Normal appearance. She is not ill-appearing, toxic-appearing or diaphoretic.   Cardiovascular:      Rate and Rhythm: Normal rate and regular rhythm.      Heart sounds: No murmur heard.     No friction rub. No gallop.   Pulmonary:      Effort: No respiratory distress.      Breath sounds: Normal breath sounds. No stridor. No wheezing, rhonchi or rales.   Musculoskeletal:         General: No swelling.      Right lower leg: No edema.      Left lower leg: No edema.   Skin:     General: Skin is warm.   Neurological:      General: No focal deficit present.      Mental Status: She is alert and oriented to person, place, and time.   Psychiatric:         Mood and Affect: Mood normal.         Behavior: Behavior normal.         Thought Content: Thought content normal.         Judgment: Judgment normal.         Laboratory Data:  PFTs: (Date: 11/14/2023)-      Impression:  1.  There is no obstruction.  2.  Bronchodilator response was not tested.  3.  RV is mildly elevated to 127%.  4.  DLCO is normal.  5.  Flow volume loop appears normal.     Normal pre-bronchodilator spirometry " with mildly elevated RV of unclear   significance.      CT Chest: (Date: 10/1/2024)-  Impression:  1.  Tiny nodules within both lungs measuring up to 3.4 mm unchanged. No new nodules.  2.  Lung base linear atelectasis. No airspace consolidation or pleural effusions.  3.  Coronary artery calcifications.        Assessment and Plan:    Problem List Items Addressed This Visit       Pulmonary nodules     Patient also has a history of scleroderma and IgG4 disease followed by rheumatology in [place] where she has been tx wtih MTX and Rituxan reportedly current in remission. PFTs in 2023 with normal with a FVC of 2.53 L or 87%, FEV1 2.01 L or 90%, FEV1/FVC 79%, %, %, DLCO 98% predicted.  She had a CT abd/pelvis from 7/2023 showing sub-cm lung nodule in RLL and f/u CT from 10/2023 showed stability of nodules up to 4 mm in size.  Repeat CT chest in October 2024 shows stability and tiny bilateral nodules, without any new nodules.   -- Repeat CT chest in 1 year.  We will follow for stability x 5 years given the patient's former smoking history.         Relevant Orders    CT-CHEST (THORAX) W/O    Nocturnal hypoxia     Currently on 1 LPM of oxygen at night.          Diagnostic studies have been reviewed with the patient.    Return in about 1 year (around 11/1/2025), or if symptoms worsen or fail to improve, for pulmonary nodules, w/ CT Chest, with [facility].     This note was generated using voice recognition software which has a chance of producing errors of grammar and possibly content.  I have made every reasonable attempt to find and correct any obvious errors, but it should be expected that some may not be found prior to finalization of this note.    Time spent in record review prior to patient arrival, reviewing results, and in face-to-face encounter totaled 28 min.  __________  TONE Cash  Pulmonary Medicine  [facility]    show

## 2024-11-01 ENCOUNTER — OFFICE VISIT (OUTPATIENT)
Dept: SLEEP MEDICINE | Facility: MEDICAL CENTER | Age: 74
End: 2024-11-01
Payer: MEDICARE

## 2024-11-01 VITALS
WEIGHT: 118 LBS | HEART RATE: 76 BPM | SYSTOLIC BLOOD PRESSURE: 122 MMHG | OXYGEN SATURATION: 98 % | HEIGHT: 65 IN | BODY MASS INDEX: 19.66 KG/M2 | DIASTOLIC BLOOD PRESSURE: 82 MMHG

## 2024-11-01 DIAGNOSIS — R91.8 PULMONARY NODULES: ICD-10-CM

## 2024-11-01 DIAGNOSIS — G47.34 NOCTURNAL HYPOXIA: ICD-10-CM

## 2024-11-01 PROCEDURE — 99213 OFFICE O/P EST LOW 20 MIN: CPT

## 2024-11-01 PROCEDURE — 3074F SYST BP LT 130 MM HG: CPT

## 2024-11-01 PROCEDURE — 3079F DIAST BP 80-89 MM HG: CPT

## 2024-11-01 ASSESSMENT — ENCOUNTER SYMPTOMS
WEIGHT LOSS: 1
SPUTUM PRODUCTION: 0
COUGH: 1
DIARRHEA: 0
NAUSEA: 0
DIZZINESS: 0
SHORTNESS OF BREATH: 0
CHILLS: 0
WEAKNESS: 0
FALLS: 0
HEADACHES: 0
HEMOPTYSIS: 0
DIAPHORESIS: 0
FEVER: 0
WHEEZING: 0
SINUS PAIN: 0
MYALGIAS: 0
HEARTBURN: 0
VOMITING: 0
PALPITATIONS: 0

## 2024-11-01 ASSESSMENT — FIBROSIS 4 INDEX: FIB4 SCORE: 2.01

## 2024-11-01 NOTE — ASSESSMENT & PLAN NOTE
Patient also has a history of scleroderma and IgG4 disease followed by rheumatology in [place] where she has been tx wtih MTX and Rituxan reportedly current in remission. PFTs in 2023 with normal with a FVC of 2.53 L or 87%, FEV1 2.01 L or 90%, FEV1/FVC 79%, %, %, DLCO 98% predicted.  She had a CT abd/pelvis from 7/2023 showing sub-cm lung nodule in RLL and f/u CT from 10/2023 showed stability of nodules up to 4 mm in size.  Repeat CT chest in October 2024 shows stability and tiny bilateral nodules, without any new nodules.   -- Repeat CT chest in 1 year.  We will follow for stability x 5 years given the patient's former smoking history.

## 2024-11-04 ENCOUNTER — HOSPITAL ENCOUNTER (OUTPATIENT)
Dept: LAB | Facility: MEDICAL CENTER | Age: 74
End: 2024-11-04
Attending: EMERGENCY MEDICINE
Payer: MEDICARE

## 2024-11-04 PROCEDURE — 36415 COLL VENOUS BLD VENIPUNCTURE: CPT

## 2024-11-04 PROCEDURE — 83018 HEAVY METAL QUAN EACH NES: CPT

## 2024-11-04 PROCEDURE — 82495 ASSAY OF CHROMIUM: CPT

## 2024-11-06 LAB
COBALT SERPL-MCNC: 1 UG/L
CR SERPL-MCNC: 1.3 UG/L

## 2025-06-25 ENCOUNTER — TELEPHONE (OUTPATIENT)
Dept: ONCOLOGY | Facility: MEDICAL CENTER | Age: 75
End: 2025-06-25
Payer: MEDICARE

## 2025-06-25 NOTE — TELEPHONE ENCOUNTER
Referred Dr. Thomason to [facility] for this drug as [facility] does not stock this medication.

## (undated) DEVICE — TUBE CONNECTING SUCTION - CLEAR PLASTIC STERILE 72 IN (50EA/CA)

## (undated) DEVICE — SENSOR SPO2 NEO LNCS ADHESIVE (20/BX) SEE USER NOTES

## (undated) DEVICE — BLADE 45 DEGREE CAEAR TIP NARROW SHAFT S/SU (6/CA)

## (undated) DEVICE — GLOVE BIOGEL SZ 7.5 SURGICAL PF LTX - (50PR/BX 4BX/CA)

## (undated) DEVICE — CANISTER SUCTION RIGID RED 1500CC (40EA/CA)

## (undated) DEVICE — ELECTRODE 850 FOAM ADHESIVE - HYDROGEL RADIOTRNSPRNT (50/PK)

## (undated) DEVICE — TUBE CONNECT SUCTION CLEAR 120 X 1/4" (50EA/CA)"

## (undated) DEVICE — SUCTION INSTRUMENT YANKAUER BULBOUS TIP W/O VENT (50EA/CA)

## (undated) DEVICE — BALL COTTON STERILE 5/PK - (5/PK 25PK/CA)

## (undated) DEVICE — WATER IRRIGATION STERILE 1000ML (12EA/CA)

## (undated) DEVICE — HEADREST SHEA

## (undated) DEVICE — PATTIES SURG X-RAYCOTTONOID - 1/2 X 3 IN (200/CA)

## (undated) DEVICE — SET LEADWIRE 5 LEAD BEDSIDE DISPOSABLE ECG (1SET OF 5/EA)

## (undated) DEVICE — TUBE EAR COLLAR BUTTON ULTRSL - (6/BX)

## (undated) DEVICE — TOWEL STOP TIMEOUT SAFETY FLAG (40EA/CA)

## (undated) DEVICE — KIT ANESTHESIA W/CIRCUIT & 3/LT BAG W/FILTER (20EA/CA)

## (undated) DEVICE — SODIUM CHL IRRIGATION 0.9% 1000ML (12EA/CA)

## (undated) DEVICE — MASK ANESTHESIA TODDLER (20EA/CA)

## (undated) DEVICE — CIRCUIT VENTILATOR PEDIATRIC WITH FILTER  (20EA/CS)

## (undated) DEVICE — CANISTER SUCTION 3000ML MECHANICAL FILTER AUTO SHUTOFF MEDI-VAC NONSTERILE LF DISP  (40EA/CA)

## (undated) DEVICE — LACTATED RINGERS INJ 1000 ML - (14EA/CA 60CA/PF)

## (undated) DEVICE — BALLOON DILATION MUTI-SINUS LOPROFILE 6MM X 20MM